# Patient Record
Sex: FEMALE | Race: WHITE | URBAN - METROPOLITAN AREA
[De-identification: names, ages, dates, MRNs, and addresses within clinical notes are randomized per-mention and may not be internally consistent; named-entity substitution may affect disease eponyms.]

---

## 2017-01-19 ENCOUNTER — OFFICE VISIT - HEALTHEAST (OUTPATIENT)
Dept: FAMILY MEDICINE | Facility: CLINIC | Age: 47
End: 2017-01-19
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
## 2017-01-30 ENCOUNTER — COMMUNICATION - HEALTHEAST (OUTPATIENT)
Dept: FAMILY MEDICINE | Facility: CLINIC | Age: 47
End: 2017-01-30

## 2017-01-30 DIAGNOSIS — Z12.83 SKIN EXAM, SCREENING FOR CANCER: ICD-10-CM

## 2017-01-31 ENCOUNTER — ALLSCRIPTS OFFICE VISIT (OUTPATIENT)
Dept: OTHER | Facility: OTHER | Age: 47
End: 2017-01-31

## 2017-01-31 ENCOUNTER — RECORDS - HEALTHEAST (OUTPATIENT)
Dept: ADMINISTRATIVE | Facility: OTHER | Age: 47
End: 2017-01-31

## 2017-01-31 DIAGNOSIS — Z79.899 OTHER LONG TERM (CURRENT) DRUG THERAPY: ICD-10-CM

## 2017-01-31 DIAGNOSIS — Z00.00 ENCOUNTER FOR GENERAL ADULT MEDICAL EXAMINATION WITHOUT ABNORMAL FINDINGS: ICD-10-CM

## 2017-01-31 DIAGNOSIS — E03.9 HYPOTHYROIDISM: ICD-10-CM

## 2017-01-31 DIAGNOSIS — R25.2 CRAMP AND SPASM: ICD-10-CM

## 2017-01-31 DIAGNOSIS — Z20.2 CONTACT WITH AND (SUSPECTED) EXPOSURE TO INFECTIONS WITH A PREDOMINANTLY SEXUAL MODE OF TRANSMISSION: ICD-10-CM

## 2017-01-31 DIAGNOSIS — L65.9 NONSCARRING HAIR LOSS: ICD-10-CM

## 2017-01-31 DIAGNOSIS — B19.20 VIRAL HEPATITIS C WITHOUT HEPATIC COMA: ICD-10-CM

## 2017-02-01 ENCOUNTER — HOSPITAL ENCOUNTER (OUTPATIENT)
Dept: RADIOLOGY | Age: 47
Discharge: HOME | End: 2017-02-01
Payer: COMMERCIAL

## 2017-02-01 DIAGNOSIS — Z13.9 SCREENING: ICD-10-CM

## 2017-02-01 PROCEDURE — G0202 SCR MAMMO BI INCL CAD: HCPCS

## 2017-02-04 ENCOUNTER — TRANSCRIBE ORDERS (OUTPATIENT)
Dept: LAB | Facility: HOSPITAL | Age: 47
End: 2017-02-04

## 2017-02-04 ENCOUNTER — APPOINTMENT (OUTPATIENT)
Dept: LAB | Facility: HOSPITAL | Age: 47
End: 2017-02-04
Payer: COMMERCIAL

## 2017-02-04 DIAGNOSIS — L65.9 ALOPECIA, UNSPECIFIED: Primary | ICD-10-CM

## 2017-02-04 DIAGNOSIS — E03.9 HYPOTHYROIDISM: ICD-10-CM

## 2017-02-04 DIAGNOSIS — L65.9 ALOPECIA, UNSPECIFIED: ICD-10-CM

## 2017-02-04 DIAGNOSIS — Z79.899 OTHER LONG TERM (CURRENT) DRUG THERAPY: ICD-10-CM

## 2017-02-04 DIAGNOSIS — Z00.00 ENCOUNTER FOR GENERAL ADULT MEDICAL EXAMINATION WITHOUT ABNORMAL FINDINGS: ICD-10-CM

## 2017-02-04 DIAGNOSIS — B19.20 VIRAL HEPATITIS C WITHOUT HEPATIC COMA: ICD-10-CM

## 2017-02-04 DIAGNOSIS — Z20.2 CONTACT WITH AND (SUSPECTED) EXPOSURE TO INFECTIONS WITH A PREDOMINANTLY SEXUAL MODE OF TRANSMISSION: ICD-10-CM

## 2017-02-04 DIAGNOSIS — L65.9 NONSCARRING HAIR LOSS: ICD-10-CM

## 2017-02-04 LAB
25(OH)D3 SERPL-MCNC: 27.1 NG/ML (ref 30–100)
ALBUMIN SERPL BCP-MCNC: 4 G/DL (ref 3.5–5)
ALP SERPL-CCNC: 58 U/L (ref 46–116)
ALT SERPL W P-5'-P-CCNC: 24 U/L (ref 12–78)
ANION GAP SERPL CALCULATED.3IONS-SCNC: 7 MMOL/L (ref 4–13)
AST SERPL W P-5'-P-CCNC: 16 U/L (ref 5–45)
BILIRUB SERPL-MCNC: 0.58 MG/DL (ref 0.2–1)
BUN SERPL-MCNC: 14 MG/DL (ref 5–25)
CALCIUM SERPL-MCNC: 8.4 MG/DL (ref 8.3–10.1)
CHLORIDE SERPL-SCNC: 102 MMOL/L (ref 100–108)
CHOLEST SERPL-MCNC: 176 MG/DL (ref 50–200)
CO2 SERPL-SCNC: 28 MMOL/L (ref 21–32)
CREAT SERPL-MCNC: 0.8 MG/DL (ref 0.6–1.3)
ERYTHROCYTE [DISTWIDTH] IN BLOOD BY AUTOMATED COUNT: 13 % (ref 11.6–15.1)
FERRITIN SERPL-MCNC: 10 NG/ML (ref 8–388)
GFR SERPL CREATININE-BSD FRML MDRD: >60 ML/MIN/1.73SQ M
GLUCOSE SERPL-MCNC: 77 MG/DL (ref 65–140)
HCG SERPL QL: NEGATIVE
HCT VFR BLD AUTO: 40.4 % (ref 34.8–46.1)
HDLC SERPL-MCNC: 85 MG/DL (ref 40–60)
HGB BLD-MCNC: 12.9 G/DL (ref 11.5–15.4)
LDLC SERPL CALC-MCNC: 80 MG/DL (ref 0–100)
MCH RBC QN AUTO: 28.9 PG (ref 26.8–34.3)
MCHC RBC AUTO-ENTMCNC: 31.9 G/DL (ref 31.4–37.4)
MCV RBC AUTO: 90 FL (ref 82–98)
PLATELET # BLD AUTO: 291 THOUSANDS/UL (ref 149–390)
PMV BLD AUTO: 9.2 FL (ref 8.9–12.7)
POTASSIUM SERPL-SCNC: 4.1 MMOL/L (ref 3.5–5.3)
PROT SERPL-MCNC: 7.7 G/DL (ref 6.4–8.2)
RBC # BLD AUTO: 4.47 MILLION/UL (ref 3.81–5.12)
SODIUM SERPL-SCNC: 137 MMOL/L (ref 136–145)
TRIGL SERPL-MCNC: 53 MG/DL
TSH SERPL DL<=0.05 MIU/L-ACNC: 0.45 UIU/ML (ref 0.36–3.74)
VIT B12 SERPL-MCNC: 299 PG/ML (ref 100–900)
WBC # BLD AUTO: 7.93 THOUSAND/UL (ref 4.31–10.16)

## 2017-02-04 PROCEDURE — 82728 ASSAY OF FERRITIN: CPT

## 2017-02-04 PROCEDURE — 87389 HIV-1 AG W/HIV-1&-2 AB AG IA: CPT

## 2017-02-04 PROCEDURE — 80074 ACUTE HEPATITIS PANEL: CPT

## 2017-02-04 PROCEDURE — 80061 LIPID PANEL: CPT

## 2017-02-04 PROCEDURE — 87591 N.GONORRHOEAE DNA AMP PROB: CPT

## 2017-02-04 PROCEDURE — 82607 VITAMIN B-12: CPT

## 2017-02-04 PROCEDURE — 84703 CHORIONIC GONADOTROPIN ASSAY: CPT

## 2017-02-04 PROCEDURE — 87491 CHLMYD TRACH DNA AMP PROBE: CPT

## 2017-02-04 PROCEDURE — 36415 COLL VENOUS BLD VENIPUNCTURE: CPT

## 2017-02-04 PROCEDURE — 85027 COMPLETE CBC AUTOMATED: CPT

## 2017-02-04 PROCEDURE — 80053 COMPREHEN METABOLIC PANEL: CPT

## 2017-02-04 PROCEDURE — 86592 SYPHILIS TEST NON-TREP QUAL: CPT

## 2017-02-04 PROCEDURE — 84443 ASSAY THYROID STIM HORMONE: CPT

## 2017-02-04 PROCEDURE — 82306 VITAMIN D 25 HYDROXY: CPT

## 2017-02-05 ENCOUNTER — GENERIC CONVERSION - ENCOUNTER (OUTPATIENT)
Dept: OTHER | Facility: OTHER | Age: 47
End: 2017-02-05

## 2017-02-05 LAB
HAV IGM SER QL: ABNORMAL
HBV CORE IGM SER QL: ABNORMAL
HBV SURFACE AG SER QL: ABNORMAL
HCV AB SER QL: ABNORMAL

## 2017-02-06 ENCOUNTER — GENERIC CONVERSION - ENCOUNTER (OUTPATIENT)
Dept: OTHER | Facility: OTHER | Age: 47
End: 2017-02-06

## 2017-02-06 LAB
CHLAMYDIA DNA CVX QL NAA+PROBE: NORMAL
HIV 1+2 AB+HIV1 P24 AG SERPL QL IA: NORMAL
N GONORRHOEA DNA GENITAL QL NAA+PROBE: NORMAL
RPR SER QL: NORMAL

## 2017-02-07 ENCOUNTER — GENERIC CONVERSION - ENCOUNTER (OUTPATIENT)
Dept: OTHER | Facility: OTHER | Age: 47
End: 2017-02-07

## 2017-02-23 ENCOUNTER — OFFICE VISIT - HEALTHEAST (OUTPATIENT)
Dept: ONCOLOGY | Facility: HOSPITAL | Age: 47
End: 2017-02-23

## 2017-02-23 ENCOUNTER — AMBULATORY - HEALTHEAST (OUTPATIENT)
Dept: INFUSION THERAPY | Facility: HOSPITAL | Age: 47
End: 2017-02-23

## 2017-02-23 DIAGNOSIS — C50.412 BREAST CANCER OF UPPER-OUTER QUADRANT OF LEFT FEMALE BREAST (H): ICD-10-CM

## 2017-02-23 DIAGNOSIS — C43.9 MELANOMA OF SKIN (H): ICD-10-CM

## 2017-02-27 ENCOUNTER — RECORDS - HEALTHEAST (OUTPATIENT)
Dept: ADMINISTRATIVE | Facility: OTHER | Age: 47
End: 2017-02-27

## 2017-02-28 ENCOUNTER — RECORDS - HEALTHEAST (OUTPATIENT)
Dept: ADMINISTRATIVE | Facility: OTHER | Age: 47
End: 2017-02-28

## 2017-03-01 ENCOUNTER — RECORDS - HEALTHEAST (OUTPATIENT)
Dept: ADMINISTRATIVE | Facility: OTHER | Age: 47
End: 2017-03-01

## 2017-03-06 ENCOUNTER — RECORDS - HEALTHEAST (OUTPATIENT)
Dept: ADMINISTRATIVE | Facility: OTHER | Age: 47
End: 2017-03-06

## 2017-03-07 ENCOUNTER — RECORDS - HEALTHEAST (OUTPATIENT)
Dept: ADMINISTRATIVE | Facility: OTHER | Age: 47
End: 2017-03-07

## 2017-04-04 ENCOUNTER — RECORDS - HEALTHEAST (OUTPATIENT)
Dept: ADMINISTRATIVE | Facility: OTHER | Age: 47
End: 2017-04-04

## 2017-06-13 ENCOUNTER — HOSPITAL ENCOUNTER (OUTPATIENT)
Dept: CT IMAGING | Facility: HOSPITAL | Age: 47
Discharge: HOME | End: 2017-06-13
Attending: FAMILY MEDICINE

## 2017-06-13 ENCOUNTER — TRANSCRIBE ORDERS (OUTPATIENT)
Dept: LAB | Facility: HOSPITAL | Age: 47
End: 2017-06-13

## 2017-06-13 ENCOUNTER — OFFICE VISIT - HEALTHEAST (OUTPATIENT)
Dept: FAMILY MEDICINE | Facility: CLINIC | Age: 47
End: 2017-06-13

## 2017-06-13 ENCOUNTER — AMBULATORY - HEALTHEAST (OUTPATIENT)
Dept: FAMILY MEDICINE | Facility: CLINIC | Age: 47
End: 2017-06-13

## 2017-06-13 ENCOUNTER — APPOINTMENT (OUTPATIENT)
Dept: LAB | Facility: HOSPITAL | Age: 47
End: 2017-06-13
Payer: COMMERCIAL

## 2017-06-13 DIAGNOSIS — R14.0 BLOATING: ICD-10-CM

## 2017-06-13 DIAGNOSIS — R79.9 ABNORMAL BLOOD CHEMISTRY: Primary | ICD-10-CM

## 2017-06-13 DIAGNOSIS — C43.9 MELANOMA (H): ICD-10-CM

## 2017-06-13 DIAGNOSIS — C50.919 BREAST CANCER (H): ICD-10-CM

## 2017-06-13 DIAGNOSIS — R10.9 ABDOMINAL PAIN: ICD-10-CM

## 2017-06-13 DIAGNOSIS — R79.9 ABNORMAL BLOOD CHEMISTRY: ICD-10-CM

## 2017-06-13 PROCEDURE — 83918 ORGANIC ACIDS TOTAL QUANT: CPT

## 2017-06-13 PROCEDURE — 36415 COLL VENOUS BLD VENIPUNCTURE: CPT

## 2017-06-13 PROCEDURE — 83090 ASSAY OF HOMOCYSTEINE: CPT

## 2017-06-13 ASSESSMENT — MIFFLIN-ST. JEOR: SCORE: 1268.04

## 2017-06-15 ENCOUNTER — COMMUNICATION - HEALTHEAST (OUTPATIENT)
Dept: FAMILY MEDICINE | Facility: CLINIC | Age: 47
End: 2017-06-15

## 2017-06-17 LAB — METHYLMALONATE SERPL-SCNC: 145 NMOL/L (ref 0–378)

## 2017-06-18 ENCOUNTER — GENERIC CONVERSION - ENCOUNTER (OUTPATIENT)
Dept: OTHER | Facility: OTHER | Age: 47
End: 2017-06-18

## 2017-06-21 LAB — MISCELLANEOUS LAB TEST RESULT: NORMAL

## 2017-06-22 ENCOUNTER — COMMUNICATION - HEALTHEAST (OUTPATIENT)
Dept: SCHEDULING | Facility: CLINIC | Age: 47
End: 2017-06-22

## 2017-06-22 ENCOUNTER — OFFICE VISIT - HEALTHEAST (OUTPATIENT)
Dept: FAMILY MEDICINE | Facility: CLINIC | Age: 47
End: 2017-06-22

## 2017-06-22 DIAGNOSIS — R10.13 EPIGASTRIC PAIN: ICD-10-CM

## 2017-06-22 DIAGNOSIS — R14.0 BLOATING: ICD-10-CM

## 2017-06-22 DIAGNOSIS — R14.2 BURPING: ICD-10-CM

## 2017-06-22 LAB — ALT SERPL W P-5'-P-CCNC: 20 U/L (ref 0–45)

## 2017-06-23 ENCOUNTER — HOSPITAL ENCOUNTER (OUTPATIENT)
Dept: ULTRASOUND IMAGING | Facility: HOSPITAL | Age: 47
Discharge: HOME | End: 2017-06-23
Attending: FAMILY MEDICINE

## 2017-06-23 ENCOUNTER — COMMUNICATION - HEALTHEAST (OUTPATIENT)
Dept: FAMILY MEDICINE | Facility: CLINIC | Age: 47
End: 2017-06-23

## 2017-06-23 DIAGNOSIS — R14.0 BLOATING: ICD-10-CM

## 2017-06-26 ENCOUNTER — AMBULATORY - HEALTHEAST (OUTPATIENT)
Dept: FAMILY MEDICINE | Facility: CLINIC | Age: 47
End: 2017-06-26

## 2017-06-26 ENCOUNTER — COMMUNICATION - HEALTHEAST (OUTPATIENT)
Dept: FAMILY MEDICINE | Facility: CLINIC | Age: 47
End: 2017-06-26

## 2017-06-26 DIAGNOSIS — R10.13 EPIGASTRIC PAIN: ICD-10-CM

## 2017-08-03 ENCOUNTER — RECORDS - HEALTHEAST (OUTPATIENT)
Dept: ADMINISTRATIVE | Facility: OTHER | Age: 47
End: 2017-08-03

## 2017-08-07 ENCOUNTER — RECORDS - HEALTHEAST (OUTPATIENT)
Dept: ADMINISTRATIVE | Facility: OTHER | Age: 47
End: 2017-08-07

## 2017-08-14 ENCOUNTER — OFFICE VISIT - HEALTHEAST (OUTPATIENT)
Dept: ONCOLOGY | Facility: HOSPITAL | Age: 47
End: 2017-08-14

## 2017-08-14 ENCOUNTER — HOSPITAL ENCOUNTER (OUTPATIENT)
Dept: RADIOLOGY | Facility: HOSPITAL | Age: 47
Discharge: HOME | End: 2017-08-14
Attending: INTERNAL MEDICINE

## 2017-08-14 DIAGNOSIS — C50.411 BREAST CANCER OF UPPER-OUTER QUADRANT OF RIGHT FEMALE BREAST (H): ICD-10-CM

## 2017-08-14 DIAGNOSIS — C50.412 BREAST CANCER OF UPPER-OUTER QUADRANT OF LEFT FEMALE BREAST (H): ICD-10-CM

## 2017-08-14 DIAGNOSIS — C43.9 MELANOMA OF SKIN (H): ICD-10-CM

## 2017-08-31 ENCOUNTER — GENERIC CONVERSION - ENCOUNTER (OUTPATIENT)
Dept: OTHER | Facility: OTHER | Age: 47
End: 2017-08-31

## 2017-10-12 ENCOUNTER — GENERIC CONVERSION - ENCOUNTER (OUTPATIENT)
Dept: OTHER | Facility: OTHER | Age: 47
End: 2017-10-12

## 2017-10-12 DIAGNOSIS — Z20.2 CONTACT WITH AND (SUSPECTED) EXPOSURE TO INFECTIONS WITH A PREDOMINANTLY SEXUAL MODE OF TRANSMISSION: ICD-10-CM

## 2017-10-12 DIAGNOSIS — E03.9 HYPOTHYROIDISM: ICD-10-CM

## 2017-10-12 DIAGNOSIS — B19.20 VIRAL HEPATITIS C WITHOUT HEPATIC COMA: ICD-10-CM

## 2017-10-13 ENCOUNTER — TRANSCRIBE ORDERS (OUTPATIENT)
Dept: LAB | Facility: HOSPITAL | Age: 47
End: 2017-10-13

## 2017-10-13 ENCOUNTER — LAB (OUTPATIENT)
Dept: LAB | Facility: HOSPITAL | Age: 47
End: 2017-10-13
Payer: COMMERCIAL

## 2017-10-13 DIAGNOSIS — Z20.2 CONTACT WITH AND (SUSPECTED) EXPOSURE TO INFECTIONS WITH A PREDOMINANTLY SEXUAL MODE OF TRANSMISSION: ICD-10-CM

## 2017-10-13 DIAGNOSIS — B19.20 VIRAL HEPATITIS C WITHOUT HEPATIC COMA: ICD-10-CM

## 2017-10-13 DIAGNOSIS — E03.9 HYPOTHYROIDISM: ICD-10-CM

## 2017-10-13 LAB
CHLAMYDIA DNA CVX QL NAA+PROBE: NORMAL
HAV IGM SER QL: ABNORMAL
HBV CORE IGM SER QL: ABNORMAL
HBV SURFACE AG SER QL: ABNORMAL
HCV AB SER QL: ABNORMAL
N GONORRHOEA DNA GENITAL QL NAA+PROBE: NORMAL
TSH SERPL DL<=0.05 MIU/L-ACNC: 1.42 UIU/ML (ref 0.36–3.74)

## 2017-10-13 PROCEDURE — 87591 N.GONORRHOEAE DNA AMP PROB: CPT

## 2017-10-13 PROCEDURE — 87491 CHLMYD TRACH DNA AMP PROBE: CPT

## 2017-10-13 PROCEDURE — 84443 ASSAY THYROID STIM HORMONE: CPT

## 2017-10-13 PROCEDURE — 87389 HIV-1 AG W/HIV-1&-2 AB AG IA: CPT

## 2017-10-13 PROCEDURE — 36415 COLL VENOUS BLD VENIPUNCTURE: CPT

## 2017-10-13 PROCEDURE — 80074 ACUTE HEPATITIS PANEL: CPT

## 2017-10-15 ENCOUNTER — GENERIC CONVERSION - ENCOUNTER (OUTPATIENT)
Dept: OTHER | Facility: OTHER | Age: 47
End: 2017-10-15

## 2017-10-16 ENCOUNTER — GENERIC CONVERSION - ENCOUNTER (OUTPATIENT)
Dept: OTHER | Facility: OTHER | Age: 47
End: 2017-10-16

## 2017-10-16 LAB — HIV 1+2 AB+HIV1 P24 AG SERPL QL IA: NORMAL

## 2017-10-23 ENCOUNTER — ALLSCRIPTS OFFICE VISIT (OUTPATIENT)
Dept: OTHER | Facility: OTHER | Age: 47
End: 2017-10-23

## 2017-10-24 NOTE — PROGRESS NOTES
Assessment  1  Hepatitis C (070 70) (B19 20)   2  Depression (311) (F32 9)   3  Generalized anxiety disorder (300 02) (F41 1)   4  Otitis externa (380 10) (H60 90)   5  Anxiety (300 00) (F41 9)    Plan   Allergic rhinitis    · Levocetirizine Dihydrochloride 5 MG Oral Tablet; TAKE 1 TABLET DAILY  Anxiety    · BusPIRone HCl - 7 5 MG Oral Tablet; 1 TAB BID AS NEEDED FOR ANXIETY  Hepatitis C    · 1 - John Fisher MD, Analy Aldana  (Gastroenterology) Co-Management  *  Status: Active  Requested for:  00CAR8876  Care Summary provided  : Yes  Otitis externa    · Hydrocortisone-Acetic Acid 1-2 % Otic Solution; INSTILL 3 DROPS 4 TIMES DAILY    Allergy/Immunology Referral Other Co-Management  *  Status: Hold For - Scheduling  Requested for: 38YWF0743  Ordered; For: Hepatitis C;  Ordered By: Karthik Snider  Performed:   Due: 27QNV6919     Discussion/Summary  Discussion Summary:   Start xyzalfor anxietygi dr Henrietta Santos for follow up of hep csolution for dry outer ear  Chief Complaint  Chief Complaint Chronic Condition [facility]: Patient is here today for follow up of chronic conditions described in HPI  History of Present Illness  HPI: patient is here to follow up on depression and anxietystill feels depressed and anxiousand vibriid not working  very tired and depressed  no suicidal thoughts  an itchy outer earchronic allergy symptoms,needs to see an allergist  she is allergic to her cat      Review of Systems  Complete-Female:   Constitutional: no fever,-- not feeling poorly,-- no chills-- and-- not feeling tired  Eyes: no eye pain,-- no eyesight problems,-- eyes not red-- and-- no purulent discharge from the eyes  ENT: nasal discharge, but-- no earache,-- no nosebleeds-- and-- no hearing loss  Cardiovascular: the heart rate was not slow,-- no chest pain,-- the heart rate was not fast-- and-- no palpitations  Respiratory: no shortness of breath,-- no cough,-- no wheezing-- and-- no shortness of breath during exertion  Gastrointestinal: no abdominal pain,-- no nausea,-- no constipation-- and-- no diarrhea  Genitourinary: no dysuria-- and-- no pelvic pain  Musculoskeletal: no arthralgias,-- no joint swelling,-- no myalgias-- and-- no joint stiffness  Integumentary: a rash-- and-- itching, but-- no skin lesions-- and-- no skin wound  Neurological: no headache,-- no numbness,-- no confusion-- and-- no dizziness  Psychiatric: anxiety-- and-- depression, but-- no sleep disturbances  Endocrine: no muscle weakness  Active Problems  1  Abnormal blood chemistry (790 6) (R79 9)   2  Allergic rhinitis (477 9) (J30 9)   3  Bereavement (V62 82) (Z63 4)   4  Bilateral thoracic back pain (724 1) (M54 6)   5  Depression (311) (F32 9)   6  Encounter for screening mammogram for malignant neoplasm of breast (V76 12) (Z12 31)   7  Exposure to STD (V01 6) (Z20 2)   8  Fatigue (780 79) (R53 83)   9  Generalized anxiety disorder (300 02) (F41 1)   10  Hair loss (704 00) (L65 9)   11  Hepatitis C (070 70) (B19 20)   12  Hypothyroidism (244 9) (E03 9)   13  Insomnia due to psychological stress (307 41) (F51 02)   14  Itching of ear (698 9) (L29 9)   15  Left leg pain (729 5) (M79 605)   16  Left leg swelling (729 81) (M79 89)   17  Leg cramping (729 82) (R25 2)   18  Leg edema, left (782 3) (R60 0)   19  Long term use of drug (V58 69) (Z79 899)   20  Low blood sugar (251 2) (E16 2)   21  Major depressive disorder, recurrent episode, severe (296 33) (F33 2)   22  Nasal congestion (478 19) (R09 81)   23  Need for prophylactic vaccination and inoculation against influenza (V04 81) (Z23)   24  Screening for cardiovascular condition (V81 2) (Z13 6)   25  Screening for cervical cancer (V76 2) (Z12 4)   26  Thoracic arthritis (721 2) (M46 84)   27  Viral hepatitis C without hepatic coma (070 70) (B19 20)    Past Medical History  Active Problems And Past Medical History Reviewed:    The active problems and past medical history were reviewed and updated today  Surgical History  1  History of Biopsy Of Liver  Surgical History Reviewed: The surgical history was reviewed and updated today  Family History  Mother    1  No pertinent family history  Father    2  Family history of Alcoholism in recovery   3  Family history of Bipolar affective disorder, current episode depressed, current episode severity   unspecified   4  Family history of COPD (chronic obstructive pulmonary disease) with acute bronchitis   5  Family history of depression (V17 0) (Z81 8)  Sister    10  Family history of depression (V17 0) (Z81 8)  Family History Reviewed: The family history was reviewed and updated today  Social History   · Bereavement (V62 82) (Z63 4)   · History of drug use (305 93) (Z87 898)   · Never a smoker   · Single  Social History Reviewed: The social history was reviewed and updated today  The social history was reviewed and is unchanged  Current Meds   1  Fexofenadine HCl - 180 MG Oral Tablet; TAKE 1 TABLET DAILY AS NEEDED FOR ALLERGIES; Therapy: 79DBW7396 to (Evaluate:13Oct2016)  Requested for: 90MDW7523; Last Rx:17Mar2016   Ordered   2  Levothyroxine Sodium 75 MCG Oral Tablet; TAKE 1 TABLET DAILY; Therapy: 14CSM2603 to (Evaluate:28Jun2018)  Requested for: 12LQK3930; Last Rx:01Oct2017   Ordered   3  Meloxicam 7 5 MG Oral Tablet; TAKE 1 TABLET Twice daily PRN PAIN;   Therapy: 24Uba3689 to (Evaluate:01May2017)  Requested for: 96IAJ1063; Last Rx:31Jan2017   Ordered   4  Montelukast Sodium 10 MG Oral Tablet; **TAKE 1 TABLET EVERY DAY; Therapy: 91OZO1941 to (Evaluate:83Nzz2090)  Requested for: 95ZFN5006; Last Rx:01Oct2017   Ordered   5  Viibryd 40 MG Oral Tablet; take one tab po daily with food; Therapy: 97XAE1988 to [phone])  Requested for: 12SFA2689; Last Rx:31Jan2017   Ordered   6  Zolpidem Tartrate 10 MG Oral Tablet; TAKE 1 TABLET AT  BEDTIME AS NEEDED FOR INSOMNIA; Therapy: 30FZN6446 to (Evaluate:79Jxb0688);  Last Rx:82Jir6691 Ordered  Medication List Reviewed: The medication list was reviewed and updated today  Allergies  1  CeleXA TABS   2  LaMICtal TABS    Vitals  Vital Signs    Recorded: 23Oct2017 08:45AM   Heart Rate 72   Respiration 16   Systolic 203   Diastolic 78   Height 5 ft 2 in   Weight 147 lb 0 8 oz   BMI Calculated 26 9   BSA Calculated 1 68   O2 Saturation 98     Physical Exam    Constitutional   General appearance: No acute distress, well appearing and well nourished  Eyes   Conjunctiva and lids: No swelling, erythema or discharge  Pupils and irises: Equal, round and reactive to light  Ears, Nose, Mouth, and Throat   External inspection of ears and nose: Normal     Otoscopic examination: Tympanic membranes translucent with normal light reflex  Canals patent without erythema  Nasal mucosa, septum, and turbinates: Normal without edema or erythema  Oropharynx: Normal with no erythema, edema, exudate or lesions  Pulmonary   Respiratory effort: No increased work of breathing or signs of respiratory distress  Auscultation of lungs: Clear to auscultation  Cardiovascular   Auscultation of heart: Normal rate and rhythm, normal S1 and S2, without murmurs  Examination of extremities for edema and/or varicosities: Normal     Abdomen   Abdomen: Non-tender, no masses  Liver and spleen: No hepatomegaly or splenomegaly  Lymphatic   Palpation of lymph nodes in neck: No lymphadenopathy  Musculoskeletal   Gait and station: Normal     Digits and nails: Normal without clubbing or cyanosis  Inspection/palpation of joints, bones, and muscles: Normal     Skin   Skin and subcutaneous tissue: Normal without rashes or lesions      Psychiatric   Orientation to person, place, and time: Normal     Mood and affect: Normal          Future Appointments    Date/Time Provider Specialty Site   10/25/2017 09:00 AM Tisha Mulligan LCSW  [facility] [address] [facility] PCP [facility]   11/14/2017 09:00 RITA Barry DO Internal Medicine [facility]     Signatures   Electronically signed by : Maryan Schmitt; Oct 23 2017 11:34AM EST                       (Author)    Electronically signed by : JEREMIE Khalil ; Oct 23 2017 12:04PM EST                       (Review)

## 2017-10-25 ENCOUNTER — ALLSCRIPTS OFFICE VISIT (OUTPATIENT)
Dept: OTHER | Facility: OTHER | Age: 47
End: 2017-10-25

## 2017-11-14 ENCOUNTER — ALLSCRIPTS OFFICE VISIT (OUTPATIENT)
Dept: OTHER | Facility: OTHER | Age: 47
End: 2017-11-14

## 2017-11-14 DIAGNOSIS — E03.9 HYPOTHYROIDISM: ICD-10-CM

## 2017-11-14 DIAGNOSIS — B19.20 VIRAL HEPATITIS C WITHOUT HEPATIC COMA: ICD-10-CM

## 2017-11-14 DIAGNOSIS — E55.9 VITAMIN D DEFICIENCY: ICD-10-CM

## 2017-11-15 ENCOUNTER — TRANSCRIBE ORDERS (OUTPATIENT)
Dept: LAB | Facility: HOSPITAL | Age: 47
End: 2017-11-15

## 2017-11-15 ENCOUNTER — APPOINTMENT (OUTPATIENT)
Dept: LAB | Facility: HOSPITAL | Age: 47
End: 2017-11-15
Payer: COMMERCIAL

## 2017-11-15 ENCOUNTER — GENERIC CONVERSION - ENCOUNTER (OUTPATIENT)
Dept: OTHER | Facility: OTHER | Age: 47
End: 2017-11-15

## 2017-11-15 DIAGNOSIS — E55.9 VITAMIN D DEFICIENCY: ICD-10-CM

## 2017-11-15 DIAGNOSIS — B19.20 VIRAL HEPATITIS C WITHOUT HEPATIC COMA: ICD-10-CM

## 2017-11-15 DIAGNOSIS — E03.9 HYPOTHYROIDISM: ICD-10-CM

## 2017-11-15 LAB
25(OH)D3 SERPL-MCNC: 37.4 NG/ML (ref 30–100)
TSH SERPL DL<=0.05 MIU/L-ACNC: 1.31 UIU/ML (ref 0.36–3.74)

## 2017-11-15 PROCEDURE — 36415 COLL VENOUS BLD VENIPUNCTURE: CPT

## 2017-11-15 PROCEDURE — 87522 HEPATITIS C REVRS TRNSCRPJ: CPT

## 2017-11-15 PROCEDURE — 84443 ASSAY THYROID STIM HORMONE: CPT

## 2017-11-15 PROCEDURE — 82306 VITAMIN D 25 HYDROXY: CPT

## 2017-11-15 NOTE — PROGRESS NOTES
Assessment    1  Never a smoker   2  Allergic rhinitis (477 9) (J30 9)   3  Depression (311) (F32 9)   4  Hepatitis C (070 70) (B19 20)   5  Hypothyroidism (244 9) (E03 9)   6  Low vitamin D level (268 9) (E55 9)  Assessment and plan 1  Anxiety and depression patient to see a new counselor she reports me not much improvement with the higher dose of Viibryd (but she is having weight gain since going on the higher dose) therefore I will have her reduce the Viibryd to 20 mg once daily, no suicidal ideation she will see her psychiatrist 2  Hepatitis-C antibody positive I will check a hepatitis C viral load patient to see GI 3  Allergic rhinitis the patient is on a nasal steroid and antihistamine she still has breakthrough symptoms she will be seeing an allergist 4  Hypothyroidism will check 3rd generation TSH 5  Low vitamin-D level continue with vitamin-D will check a vitamin-D level return to office   months 3  call if any problems   Plan  Depression    · From  Viibryd 40 MG Oral Tablet take one tab po daily with food To Viibryd 20 MG OralTablet Take 1 tablet daily  Hepatitis C    · (1) HEP C RNA PCR, QUANTITATIVE; Status:Active; Requested for:14Nov2017;   Hypothyroidism    · (1) TSH; Status:Active; Requested for:14Nov2017;   Low vitamin D level    · (1) VITAMIN D 25-HYDROXY; Status:Active - Retrospective By Protocol Authorization; Requestedfor:14Nov2017;    · *(Q) VITAMIN D, 25-HYDROXY, LC/MS/MS; Status:Active; Requested EM:33KLZ4635;     Chief Complaint  Chief Complaint Chronic Condition [facility]: Patient is here today for follow up of chronic conditions described in HPI        History of Present Illness  HPI: [age]-year old female coming in for a follow up office visit regarding Hepatitis-C, depression, general anxiety, hypothyroidism and allergic rhinitis the pt had seen Kelle Snow the med wasn't working right and getting new counsellor at [employer]- anxiety and depression for 6 months worsening , broke up with some one, grief , fatigue no si no hi, new boyfriend is supportive in rehab for etoh  healthy diet, walking 1 hour daily   weight gain  She is trying to follow a healthy imbalance diet  The patient reports me compliant taking medications without untoward side effects the  The patient is here to review his medical condition, update me on the medical condition and the patient reports me no hospitalizations and no ER visits  Review of Systems  Complete-Female:  Constitutional: No fever, no chills, feels well, no tiredness, no recent weight gain or weight loss  Cardiovascular: No complaints of slow heart rate, no fast heart rate, no chest pain, no palpitations, no leg claudication, no lower extremity edema  Respiratory: No complaints of shortness of breath, no wheezing, no cough, no SOB on exertion, no orthopnea, no PND  Gastrointestinal: No complaints of abdominal pain, no constipation, no nausea or vomiting, no diarrhea, no bloody stools  Genitourinary: No complaints of dysuria, no incontinence, no pelvic pain, no dysmenorrhea, no vaginal discharge or bleeding  Psychiatric: anxiety-- and-- depression, but-- not suicidal    ROS Reviewed:   ROS reviewed  Active Problems  1  Abnormal blood chemistry (790 6) (R79 9)   2  Allergic rhinitis (477 9) (J30 9)   3  Anxiety (300 00) (F41 9)   4  Bereavement (V62 82) (Z63 4)   5  Bilateral thoracic back pain (724 1) (M54 6)   6  Depression (311) (F32 9)   7  Encounter for screening mammogram for malignant neoplasm of breast (V76 12) (Z12 31)   8  Exposure to STD (V01 6) (Z20 2)   9  Fatigue (780 79) (R53 83)   10  Generalized anxiety disorder (300 02) (F41 1)   11  Hair loss (704 00) (L65 9)   12  Hepatitis C (070 70) (B19 20)   13  Hypothyroidism (244 9) (E03 9)   14  Insomnia due to psychological stress (307 41) (F51 02)   15  Itching of ear (698 9) (L29 9)   16  Left leg pain (729 5) (M79 605)   17  Left leg swelling (729 81) (M79 89)   18   Leg cramping (729 82) (R25 2)   19  Leg edema, left (782 3) (R60 0)   20  Long term use of drug (V58 69) (Z79 899)   21  Low blood sugar (251 2) (E16 2)   22  Major depressive disorder, recurrent episode, severe (296 33) (F33 2)   23  Nasal congestion (478 19) (R09 81)   24  Need for prophylactic vaccination and inoculation against influenza (V04 81) (Z23)   25  Otitis externa (380 10) (H60 90)   26  Screening for cardiovascular condition (V81 2) (Z13 6)   27  Screening for cervical cancer (V76 2) (Z12 4)   28  Thoracic arthritis (721 2) (M46 84)   29  Viral hepatitis C without hepatic coma (070 70) (B19 20)    Past Medical History  Active Problems And Past Medical History Reviewed: The active problems and past medical history were reviewed and updated today  Surgical History    1  History of Biopsy Of Liver  Surgical History Reviewed: The surgical history was reviewed and updated today  Family History  Mother    1  No pertinent family history  Father    2  Family history of Alcoholism in recovery   3  Family history of Bipolar affective disorder, current episode depressed, current episode severity unspecified   4  Family history of COPD (chronic obstructive pulmonary disease) with acute bronchitis   5  Family history of depression (V17 0) (Z81 8)  Sister    10  Family history of depression (V17 0) (Z81 8)  Family History Reviewed: The family history was reviewed and updated today  Social History     · Bereavement (V62 82) (Z63 4)   · History of drug use (305 93) (Z87 898)   · Never a smoker   · Non smoker / tobacco user (V49 89) (Z78 9)   · Single  Social History Reviewed: The social history was reviewed and updated today  The social history was reviewed and is unchanged  Current Meds   1  BusPIRone HCl - 7 5 MG Oral Tablet; 1 TAB BID AS NEEDED FOR ANXIETY; Therapy: 34YQL6443 to (Jed Lundborg)  Requested for: 23NEB4467; Last Rx:06Nov2017 Ordered   2   Fexofenadine HCl - 180 MG Oral Tablet; TAKE 1 TABLET DAILY AS NEEDED FOR ALLERGIES; Therapy: 59QOL2789 to (Evaluate:2016)  Requested for: 42LAB0432; Last Rx:2016 Ordered   3  Hydrocortisone-Acetic Acid 1-2 % Otic Solution; INSTILL 3 DROPS 4 TIMES DAILY; Therapy: 71VFD1048 to (Evaluate:05Nkt2467)  Requested for: 15BLD4344; Last Rx:2017 Ordered   4  Levocetirizine Dihydrochloride 5 MG Oral Tablet; TAKE 1 TABLET DAILY; Therapy: 89TNN3343 to (OAUFTGN40LDJ6044)  Requested for: 63NLD5533; Last Rx:2017 Ordered   5  Levothyroxine Sodium 75 MCG Oral Tablet; TAKE 1 TABLET DAILY; Therapy: 98ENI3447 to (Evaluate:2018)  Requested for: 73ZUW0547; Last Rx:2017 Ordered   6  Meloxicam 7 5 MG Oral Tablet; TAKE 1 TABLET Twice daily PRN PAIN; Therapy: 02Snt2224 to (Evaluate:2017)  Requested for: 06RHB2444; Last Rx:2017 Ordered   7  Montelukast Sodium 10 MG Oral Tablet; **TAKE 1 TABLET EVERY DAY; Therapy: 45FVU8757 to (Evaluate:32Vzu4741)  Requested for: 62QWC5159; Last Rx:2017 Ordered   8  Viibryd 40 MG Oral Tablet; take one tab po daily with food; Therapy: 69QRW9071 to ()  Requested for: 12KNE9634; Last Rx:2017 Ordered   9  Zolpidem Tartrate 10 MG Oral Tablet; TAKE 1 TABLET AT  BEDTIME AS NEEDED FOR INSOMNIA; Therapy: 95HDB8082 to (Evaluate:93Ttl1398); Last Rx:2016 Ordered  Medication List Reviewed: The medication list was reviewed and updated today  Allergies  1  CeleXA TABS   2  LaMICtal TABS    Vitals  Vital Signs    Recorded: 88ENN4405 08:54AM   Heart Rate 64   Respiration 16   Systolic 921, LUE, Sitting   Diastolic 88, LUE, Sitting   Weight 151 lb 0 8 oz   BMI Calculated 27 63   BSA Calculated 1 7   O2 Saturation 98       Physical Exam   Constitutional  General appearance: No acute distress, well appearing and well nourished  Eyes  Conjunctiva and lids: No swelling, erythema or discharge  Pupils and irises: Equal, round and reactive to light     Ears, Nose, Mouth, and Throat  External inspection of ears and nose: Normal    Otoscopic examination: Tympanic membranes translucent with normal light reflex  Canals patent without erythema  Nasal mucosa, septum, and turbinates: Normal without edema or erythema  Oropharynx: Normal with no erythema, edema, exudate or lesions  Pulmonary  Respiratory effort: No increased work of breathing or signs of respiratory distress  Auscultation of lungs: Clear to auscultation  Cardiovascular  Auscultation of heart: Normal rate and rhythm, normal S1 and S2, without murmurs  Examination of extremities for edema and/or varicosities: Normal    Abdomen  Abdomen: Non-tender, no masses  Psychiatric  Mood and affect: Abnormal   Mood and Affect: not anxious-- and-- depressed  Results/Data  (1) HIV AG/AB Alina Lithuanian GEN 72TYL2621 07:39AM Cyndy Smart    Order Number: [id number]     Test Name Result Flag Reference   HIV 1/2 AND P24 Non-Reactive  Non-Reactive   This test detects HIV 1, HIV2 and p24 Antigen  (1) CHLAMYDIA/GC AMPLIFIED DNA, PCR 70EWO8262 07:39AM Cyndy Smart    Order Number: [id number]     Test Name Result Flag Reference   CHLAMYDIA,AMPLIFIED DNA PROBE   C  trachomatis Amplified DNA Negative   C  trachomatis Amplified DNA Negative   For optimal microbe detection, urine samples should be a first catch specimen (20-60 ml of urine)  Patient should not have urinated for at least 1 hour prior to collection  A specimen not collected in this manner may have falsely negative results     Scarlet Mariusz AMPLIFIED DNA   N  gonorrhoeae Amplified DNA Negative   N  gonorrhoeae Amplified DNA Negative     (1) ACUTE HEPATITIS PANEL 97Qgh3918 07:39AM Liliam Schrader Order Number: [id number]     Test Name Result Flag Reference   HEPATITIS B SURFACE ANTIGEN Non-reactive  Non-reactive, NonReactive - Confirmed   HEPATITIS A IGM ANTIBODY Non-reactive  Non-reactive, Equivocal-Suggest Recollect   HEPATITIS C ANTIBODY High Reactive A Non-reactive   HEPATITIS B CORE IGM ANTIBODY Non-reactive  Non-reactive     (1) TSH WITH FT4 REFLEX 13Oct2017 07:39AM Richard De Leon    Order Number: [id number]     Test Name Result Flag Reference   TSH 1 420 uIU/mL  0 358-3 740     Patients undergoing fluorescein dye angiography may retain small amounts of fluorescein in the body for 48-72 hours post procedure  Samples containing fluorescein can produce falsely depressed TSH values  If the patient had this procedure,a specimen should be resubmitted post fluorescein clearance  The recommended reference ranges for TSH during pregnancy are as follows: First trimester 0 1 to 2 5 uIU/mL Second trimester  0 2 to 3 0 uIU/mL Third trimester 0 3 to 3 0 uIU/m     (1) METHYLMALONIC ACID,BLOOD 78MRS7997 08:39AM Richard De Leon     Test Name Result Flag Reference   METHYLMALONIC ACID 145 nmol/L  0 - 378     Performed at:  [address]  [id number] : Bruna Bruner MD, Phone:  [phone]     Health Management  Screening for cervical cancer   (1) THIN PREP PAP WITH IMAGING; every 5 years; Last 35MCR5169; Next Due: 04PEG5646;  Active    Future Appointments    Date/Time Provider Specialty Site   03/12/2018 08:00 AM Richard De Leon DO Internal Medicine [facility]   01/22/2018 08:40 AM Demetrius Harris MD Gastroenterology Adult [address]       Signatures   Electronically signed by : Tuyet Parker DO; Nov 14 2017 12:41PM EST                       (Author)

## 2017-11-17 LAB
HCV RNA SERPL NAA+PROBE-ACNC: NORMAL IU/ML
TEST INFORMATION: NORMAL

## 2017-11-19 ENCOUNTER — GENERIC CONVERSION - ENCOUNTER (OUTPATIENT)
Dept: OTHER | Facility: OTHER | Age: 47
End: 2017-11-19

## 2018-01-11 NOTE — MISCELLANEOUS
Provider Comments  Provider Comments:   Pt was a n/s  SWP she said she forgot, she will c/b to make new appt        Signatures   Electronically signed by : Lance Bashir DO; Oct 14 2016  4:29PM EST                       (Author)
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
## 2018-01-11 NOTE — RESULT NOTES
Message   notify the pt normal methylmalonic acid f/u as scheduled        Verified Results  (1) METHYLMALONIC ACID,BLOOD 91NPK9240 08:39AM Renee Pal     Test Name Result Flag Reference   METHYLMALONIC ACID 145 nmol/L  0 - 378   Performed at:  [address]  [id number]  : Jeromy Mejia MD, Phone:  [phone]       Signatures   Electronically signed by : Tyesha Neri DO; Jun 18 2017  1:13PM EST                       (Author)

## 2018-01-11 NOTE — MISCELLANEOUS
Message   Recorded as Task   Date: 05/11/2016 09:09 AM, Created By: Brendan Ventura   Task Name: Miscellaneous   Assigned To: Brendan Ventura   Regarding Patient: Petros De La Cruz, Status: Active   CommentRoxane Andrea - 11 May 2016 9:09 AM     TASK CREATED  Patient left a message with the answering service to cancel her appt  5/11/16 @ 10:45 AM with Dr Quique Blanco  The patient gave no reason for the cancellation and asked to be called 5/12/16 to reschedule  Kathleen Vieyra - 11 May 2016 9:11 AM     TASK REPLIED TO: Previously Assigned To Kathleen Vieyra md aware        Active Problems    1  Allergic rhinitis (477 9) (J30 9)   2  Bilateral thoracic back pain (724 1) (M54 6)   3  Depression (311) (F32 9)   4  Encounter for screening mammogram for malignant neoplasm of breast (V76 12)   (Z12 31)   5  Generalized anxiety disorder (300 02) (F41 1)   6  HCV (hepatitis C virus) (070 70) (B19 20)   7  Hypothyroidism (244 9) (E03 9)   8  Low blood sugar (251 2) (E16 2)   9  Major depressive disorder, recurrent episode, severe (296 33) (F33 2)   10  Need for prophylactic vaccination and inoculation against influenza (V04 81) (Z23)   11  Obesity (278 00) (E66 9)   12  Screening for cardiovascular condition (V81 2) (Z13 6)   13  Thoracic arthritis (721 2) (M46 84)    Current Meds   1  ALPRAZolam 0 25 MG Oral Tablet; TAKE 1 TABLET TWICE DAILY; Therapy: 63Wxc6874 to (Evaluate:24Oct2016); Last Rx:36Gyo1591 Ordered   2  Fexofenadine HCl - 180 MG Oral Tablet (Allegra Allergy); TAKE 1 TABLET DAILY AS   NEEDED FOR ALLERGIES; Therapy: 71CBO0044 to (Evaluate:11Hdq2706)  Requested for: 46MLB0642; Last   Rx:17Mar2016 Ordered   3  Levothyroxine Sodium 75 MCG Oral Tablet; TAKE 1 TABLET DAILY; Therapy: 03INC0127 to (Evaluate:17Qzo0748)  Requested for: 75FCF4820; Last   Rx:17Mar2016 Ordered   4  Montelukast Sodium 10 MG Oral Tablet (Singulair); take 1 tablet every day;    Therapy: 36RFT6502 to (Evaluate:15Jun2016)  Requested for: 99NAJ2478; Last   Rx:17Mar2016 Ordered   5  TraMADol HCl - 50 MG Oral Tablet; TAKE 1 TABLET EVERY 8 HOURS AS NEEDED; Therapy: 79TRG0890 to (Evaluate:06Apr2016); Last Rx:17Mar2016 Ordered   6  Viibryd 40 MG Oral Tablet; take one tab po daily with food; Therapy: 65MGI6392 to ([phone])  Requested for: 92BFN3949; Last   Rx:04Feb2016 Ordered   7  Zolpidem Tartrate 10 MG Oral Tablet (Ambien); TAKE 1 TABLET AT  BEDTIME AS   NEEDED FOR INSOMNIA; Therapy: 89DYG7833 to (Evaluate:22Nxz3255); Last Rx:91Oyw2576; Status: ACTIVE -   Renewal Voided Ordered    Allergies    1  CeleXA TABS   2  LaMICtal TABS    Signatures   Electronically signed by :  Kristina Childers, ; May 11 2016  9:26AM EST                       (Author)

## 2018-01-12 NOTE — RESULT NOTES
Message   Notify the patient normal TSH follow up as scheduled        Verified Results  (1) VITAMIN D 25-HYDROXY 87UZI7925 07:20AM Liliana Hidalgo     Test Name Result Flag Reference   VIT D 25-HYDROX 27 1 ng/mL L 30 0-100 0   This assay is a certified procedure of the CDC Vitamin D Standardization Certification Program (VDSCP)     Deficiency <20ng/ml   Insufficiency 20-30ng/ml   Sufficient  ng/ml     *Patients undergoing fluorescein dye angiography may retain small amounts of fluorescein in the body for 48-72 hours post procedure  Samples containing fluorescein can produce falsely elevated Vitamin D values  If the patient had this procedure, a specimen should be resubmitted post fluorescein clearance  (1) TSH 43ORC2574 07:20AM Liliana Hidalgo     Test Name Result Flag Reference   TSH 0 450 uIU/mL  0 358-3 740   - Patient Instructions: This is a fasting blood test  Water,black tea or black  coffee only after 9:00pm the night before test Drink 2 glasses of water the morning of test   Patients undergoing fluorescein dye angiography may retain small amounts of fluorescein in the body for 48-72 hours post procedure  Samples containing fluorescein can produce falsely depressed TSH values  If the patient had this procedure,a specimen should be resubmitted post fluorescein clearance            The recommended reference ranges for TSH during pregnancy are as follows:  First trimester 0 1 to 2 5 uIU/mL  Second trimester  0 2 to 3 0 uIU/mL  Third trimester 0 3 to 3 0 uIU/m       Signatures   Electronically signed by : Sridhar Townsend DO; Feb 5 2017  9:24AM EST                       (Author)

## 2018-01-12 NOTE — RESULT NOTES
Message   Notify the patient the chlamydia and gonorrhea tests were negative follow-up as scheduled        Verified Results  (1) CHLAMYDIA/GC AMPLIFIED DNA, PCR [id number] 07:20AM Bailey Donnelly    Order Number: [id number]     Test Name Result Flag Reference   CHLAMYDIA,AMPLIFIED DNA PROBE   C  trachomatis Amplified DNA Negative   C  trachomatis Amplified DNA Negative   For optimal microbe detection, urine samples should be a first catch specimen (20-60 ml of urine)  Patient should not have urinated for at least 1 hour prior to collection  A specimen not collected in this manner may have falsely negative results  N  GONORRHOEAE AMPLIFIED DNA   N  gonorrhoeae Amplified DNA Negative   N  gonorrhoeae Amplified DNA Negative       Plan  Abnormal blood chemistry    · (Q) METHYLMALONIC ACID AND HOMOCYSTEINE (NUTRITIONAL AND  CONGENITAL); Status:Active;  Requested for:97Ylx3507;     Signatures   Electronically signed by : Martina Ortiz DO; Feb 7 2017  7:38PM EST                       (Author)

## 2018-01-12 NOTE — RESULT NOTES
Message   Notify the patient low vitamin D level please let me know how much vitamin D the patient is currently taking        Verified Results  (1) VITAMIN D 25-HYDROXY [id number] 07:20AM Mandy Fletcher     Test Name Result Flag Reference   VIT D 25-HYDROX 27 1 ng/mL L 30 0-100 0   This assay is a certified procedure of the CDC Vitamin D Standardization Certification Program (VDSCP)     Deficiency <20ng/ml   Insufficiency 20-30ng/ml   Sufficient  ng/ml     *Patients undergoing fluorescein dye angiography may retain small amounts of fluorescein in the body for 48-72 hours post procedure  Samples containing fluorescein can produce falsely elevated Vitamin D values  If the patient had this procedure, a specimen should be resubmitted post fluorescein clearance         Signatures   Electronically signed by : Bailey Fontaine DO; Feb 5 2017  9:23AM EST                       (Author)

## 2018-01-13 VITALS
DIASTOLIC BLOOD PRESSURE: 82 MMHG | BODY MASS INDEX: 23.37 KG/M2 | HEIGHT: 62 IN | HEART RATE: 74 BPM | SYSTOLIC BLOOD PRESSURE: 116 MMHG | WEIGHT: 127 LBS | RESPIRATION RATE: 16 BRPM

## 2018-01-13 NOTE — RESULT NOTES
Message   Notify the patient the HIV test was nonreactive follow-up as scheduled to discuss the report        Verified Results  (1) HIV AG/AB Taryn Kinds GEN [id number] 07:39AM Devyn TILLMAN Order Number: [id number]     Test Name Result Flag Reference   HIV 1/2 AND P24 Non-Reactive  Non-Reactive   This test detects HIV 1, HIV2 and p24 Antigen

## 2018-01-14 VITALS
DIASTOLIC BLOOD PRESSURE: 88 MMHG | HEART RATE: 64 BPM | SYSTOLIC BLOOD PRESSURE: 128 MMHG | WEIGHT: 151.05 LBS | BODY MASS INDEX: 27.63 KG/M2 | RESPIRATION RATE: 16 BRPM | OXYGEN SATURATION: 98 %

## 2018-01-14 VITALS
HEART RATE: 72 BPM | BODY MASS INDEX: 27.06 KG/M2 | OXYGEN SATURATION: 98 % | RESPIRATION RATE: 16 BRPM | SYSTOLIC BLOOD PRESSURE: 118 MMHG | DIASTOLIC BLOOD PRESSURE: 78 MMHG | WEIGHT: 147.05 LBS | HEIGHT: 62 IN
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
## 2018-01-14 NOTE — RESULT NOTES
Message   Please notify the patient normal labs follow up as scheduled to discuss the report in detail        Verified Results  (1) VITAMIN D 25-HYDROXY 16TKE5405 08:03AM Steve Alonzo    Order Number: [id number]     Test Name Result Flag Reference   VIT D 25-HYDROX 37 4 ng/mL  30 0-100 0   This assay is a certified procedure of the CDC Vitamin D Standardization Certification Program (VDSCP)     Deficiency <20ng/ml   Insufficiency 20-30ng/ml   Sufficient  ng/ml     *Patients undergoing fluorescein dye angiography may retain small amounts of fluorescein in the body for 48-72 hours post procedure  Samples containing fluorescein can produce falsely elevated Vitamin D values  If the patient had this procedure, a specimen should be resubmitted post fluorescein clearance         Signatures   Electronically signed by : Flakito Mcleod DO; Nov 15 2017  9:14PM EST                       (Author)

## 2018-01-15 NOTE — PSYCH
Message  Message Free Text Note Form: Pt apparently requested Discharge, per conversation with Physician Assistant (Kerwni Romano ), so PT  IS DISCHARGED  Formal Discharge Summary to be completed as soon as time allows  -M  Lauren, MS, APRN, PMHCNS      Active Problems    1  Allergic rhinitis (477 9) (J30 9)   2  Bereavement (V62 82) (Z63 4)   3  Bilateral thoracic back pain (724 1) (M54 6)   4  Depression (311) (F32 9)   5  Encounter for screening mammogram for malignant neoplasm of breast (V76 12)   (Z12 31)   6  Generalized anxiety disorder (300 02) (F41 1)   7  HCV (hepatitis C virus) (070 70) (B19 20)   8  Hypothyroidism (244 9) (E03 9)   9  Left leg pain (729 5) (M79 605)   10  Leg edema, left (782 3) (R60 0)   11  Low blood sugar (251 2) (E16 2)   12  Major depressive disorder, recurrent episode, severe (296 33) (F33 2)   13  Need for prophylactic vaccination and inoculation against influenza (V04 81) (Z23)   14  Obesity (278 00) (E66 9)   15  Screening for cardiovascular condition (V81 2) (Z13 6)   16  Thoracic arthritis (721 2) (M46 84)    Current Meds   1  ALPRAZolam 0 25 MG Oral Tablet; TAKE 1 TABLET TWICE DAILY; Therapy: 08Gvg7485 to (Evaluate:62Vuc6824); Last Rx:43Nfc9755 Ordered   2  Fexofenadine HCl - 180 MG Oral Tablet; TAKE 1 TABLET DAILY AS NEEDED FOR   ALLERGIES; Therapy: 18PSM2589 to (Evaluate:13Oct2016)  Requested for: 39PAS5942; Last   Rx:17Mar2016 Ordered   3  Levothyroxine Sodium 75 MCG Oral Tablet; TAKE 1 TABLET DAILY; Therapy: 85KDQ4329 to (Evaluate:62Rvc1060)  Requested for: 47KSC6868; Last   Rx:17Mar2016 Ordered   4  Meloxicam 7 5 MG Oral Tablet; TAKE 1 TABLET Twice daily PRN PAIN;   Therapy: 66Snd4469 to (Evaluate:10Lyh6695)  Requested for: 83HJI3749; Last   Rx:45Tkn3038; Status: ACTIVE - Renewal Denied Ordered   5  Montelukast Sodium 10 MG Oral Tablet; take 1 tablet every day; Therapy: 34LUO3375 to (Evaluate:17Lwk1821)  Requested for: 56WAT0748; Last   Rx:44Fyd8023 Ordered   6  TraMADol HCl - 50 MG Oral Tablet; TAKE 1 TABLET EVERY 8 HOURS AS NEEDED; Therapy: 55FHN4158 to (Evaluate:06Apr2016); Last Rx:17Mar2016 Ordered   7  Viibryd 40 MG Oral Tablet; take one tab po daily with food; Therapy: 18LGE1398 to ([phone])  Requested for: 52HKL9928; Last   Rx:13Bvp8233 Ordered   8  Zolpidem Tartrate 10 MG Oral Tablet; TAKE 1 TABLET AT  BEDTIME AS NEEDED FOR   INSOMNIA; Therapy: 88IVQ4061 to (Evaluate:06Mtq8902); Last Rx:01Tvn4007 Ordered    Allergies    1  CeleXA TABS   2   LaMICtal TABS    Signatures   Electronically signed by : Safia Jha MSAPRNPMHCNS-BC; Sep  8 2016 11:42AM EST                       (Author)

## 2018-01-15 NOTE — PSYCH
History of Present Illness  Psychotherapy Provided [facility]: Individual Psychotherapy 25 minutes minutes provided today  Goals addressed in session:   Zane Ruiz details ongoing issues with anxiety  No longer has outpatient psychiatric provider  Does not want to return to [facility]  Not sure she would be accepted back  Has issues with non-compliance  She presents as somewhat anxious  She is pleasant, verbal and cooperative  HPI - Psych: Zane Ruiz having issues with anxiety and sleep  Has started BuSpar from PCP but has not been taking long enough to gain any benefit  History indicates need for outpatient psychiatric provider  Requesting outside provider  Denies depressive symptoms  No SI   Note   Note:   Contacted the [place] [address] [address] [facility] and they are accepting new patients  Provided number for their intake line and she agrees to contact  She also agrees to contact me once she is schedule  If there is an issues with her insurance, she will contact me as there is a private physician that may be an option  Patient left me message and she has intake appt at [facility] [address] on 11/10/17  Assessment    1   Anxiety (300 00) (F41 9)    Signatures   Electronically signed by : Rudy Rizvi LCSW; Oct 25 2017 12:23PM EST                       (Author)    Electronically signed by : Rudy Rizvi LCSW; Oct 25 2017 12:46PM EST                       (Author)

## 2018-01-15 NOTE — MISCELLANEOUS
Provider Comments  Provider Comments:   Patient no-showed for her appt with m today at 1:30PM       Signatures   Electronically signed by : Mingo Martinez LCSW; Mar 31 2016  1:59PM EST                       (Author)

## 2018-01-15 NOTE — RESULT NOTES
Message   Notify the patient negative labs including negative HIV test but there is a positive hepatitis C test please have the patient see GI doctor Otilia Woodard (also have pt address the low ferritin), also low ferritin - is the pt on iron , also low normal vitamin B12 level please check methylmalonic acid and homocystine level vic   f/u to disuss         Verified Results  (1) HIV AG/AB Ella Poon GEN 75PPK8097 07:20AM [facility]    Order Number: [id number]     Test Name Result Flag Reference   HIV 1/2 AND P24 Non-Reactive  Non-Reactive   This test detects HIV 1, HIV2 and p24 Antigen  (1) RPR 26AAV7299 07:20AM [facility] Order Number: [id number]     Test Name Result Flag Reference   RPR Non-Reactive  Non-Reactive     (1) ACUTE HEPATITIS PANEL 43EKJ3751 07:20AM [facility]    Order Number: [id number]     Test Name Result Flag Reference   HEPATITIS B SURFACE ANTIGEN Non-reactive  Non-reactive, NonReactive - Confirmed   HEPATITIS A IGM ANTIBODY Non-reactive  Non-reactive, Equivocal-Suggest Recollect   HEPATITIS C ANTIBODY High Reactive A Non-reactive   HEPATITIS B CORE IGM ANTIBODY Non-reactive  Non-reactive     (1) COMPREHENSIVE METABOLIC PANEL 46ISK5863 29:33YG [facility]    Order Number: [id number]     Test Name Result Flag Reference   GLUCOSE,RANDM 77 mg/dL     If the patient is fasting, the ADA then defines impaired fasting glucose as > 100 mg/dL and diabetes as > or equal to 123 mg/dL     SODIUM 137 mmol/L  136-145   POTASSIUM 4 1 mmol/L  3 5-5 3   CHLORIDE 102 mmol/L  100-108   CARBON DIOXIDE 28 mmol/L  21-32   ANION GAP (CALC) 7 mmol/L  4-13   BLOOD UREA NITROGEN 14 mg/dL  5-25   CREATININE 0 80 mg/dL  0 60-1 30   Standardized to IDMS reference method   CALCIUM 8 4 mg/dL  8 3-10 1   BILI, TOTAL 0 58 mg/dL  0 20-1 00   ALK PHOSPHATAS 58 U/L     ALT (SGPT) 24 U/L  12-78   AST(SGOT) 16 U/L  5-45   ALBUMIN 4 0 g/dL 3 5-5 0   TOTAL PROTEIN 7 7 g/dL  6 4-8 2   eGFR Non-African American      >60 0 ml/min/1 73sq m   - Patient Instructions: This is a fasting blood test  Water,black tea or black  coffee only after 9:00pm the night before test Drink 2 glasses of water the morning of test   National Kidney Disease Education Program recommendations are as follows:  GFR calculation is accurate only with a steady state creatinine  Chronic Kidney disease less than 60 ml/min/1 73 sq  meters  Kidney failure less than 15 ml/min/1 73 sq  meters  (1) LIPID PANEL, FASTING 04GYV0281 07:20AM Vanessa Abida   TW Order Number: [id number]     Test Name Result Flag Reference   CHOLESTEROL 176 mg/dL     HDL,DIRECT 85 mg/dL H 40-60   Specimen collection should occur prior to Metamizole administration due to the potential for falsely depressed results  LDL CHOLESTEROL CALCULATED 80 mg/dL  0-100   - Patient Instructions: This is a fasting blood test  Water,black tea or black  coffee only after 9:00pm the night before test   Drink 2 glasses of water the morning of test     - Patient Instructions: This is a fasting blood test  Water,black tea or black  coffee only after 9:00pm the night before test Drink 2 glasses of water the morning of test   Triglyceride:         Normal              <150 mg/dl       Borderline High    150-199 mg/dl       High               200-499 mg/dl       Very High          >499 mg/dl  Cholesterol:         Desirable        <200 mg/dl      Borderline High  200-239 mg/dl      High             >239 mg/dl  HDL Cholesterol:        High    >59 mg/dL      Low     <41 mg/dL  LDL CALCULATED:    This screening LDL is a calculated result  It does not have the accuracy of the Direct Measured LDL in the monitoring of patients with hyperlipidemia and/or statin therapy  Direct Measure LDL (FFF021) must be ordered separately in these patients     TRIGLYCERIDES 53 mg/dL  <=150   Specimen collection should occur prior to N-Acetylcysteine or Metamizole administration due to the potential for falsely depressed results  (1) VITAMIN B12 17NXP0441 07:20AM [facility] Order Number: [id number]     Test Name Result Flag Reference   VITAMIN B12 299 pg/mL  100-900   - Patient Instructions: This is a fasting blood test  Water,black tea or black  coffee only after 9:00pm the night before test Drink 2 glasses of water the morning of test      (1) FERRITIN 65BCV2816 07:20AM [facility] Order Number: [id number]     Test Name Result Flag Reference   FERRITIN 10 ng/mL  0-980   - Patient Instructions: This is a fasting blood test  Water,black tea or black  coffee only after 9:00pm the night before test Drink 2 glasses of water the morning of test      (1) CBC/ PLT (NO DIFF) 49HCX4109 07:20AM Cheyanne Gomezs Order Number: [id number]     Test Name Result Flag Reference   HEMATOCRIT 40 4 %  34 8-46 1   HEMOGLOBIN 12 9 g/dL  11 5-15 4   MCHC 31 9 g/dL  31 4-37 4   MCH 28 9 pg  26 8-34 3   MCV 90 fL  82-98   PLATELET COUNT 234 Thousands/uL  149-390   RBC COUNT 4 47 Million/uL  3 81-5 12   RDW 13 0 %  11 6-15 1   WBC COUNT 7 93 Thousand/uL  4 31-10 16   MPV 9 2 fL  8 9-12 7     (1) PREGNANCY TEST (HCG QUALITATIVE) [id number] 07:20AM [facility] Order Number: [id number]     Test Name Result Flag Reference   PREGNANCY TEST Negative  Negative   - Patient Instructions:  This is a fasting blood test  Water,black tea or black  coffee only after 9:00pm the night before test Drink 2 glasses of water the morning of test        Signatures   Electronically signed by : Laura Ly DO; Feb 6 2017  6:53PM EST                       (Author)

## 2018-01-16 NOTE — RESULT NOTES
Message   please have pt f/u as scheduled to discuss   please have pt f/u with Darlyn to discuss positive result Hepatitic C antibody high reactive        Verified Results  (1) CHLAMYDIA/GC AMPLIFIED DNA, PCR 13Oct2017 07:39AM Don Oven   TW Order Number: [id number]     Test Name Result Flag Reference   CHLAMYDIA,AMPLIFIED DNA PROBE   C  trachomatis Amplified DNA Negative   C  trachomatis Amplified DNA Negative   For optimal microbe detection, urine samples should be a first catch specimen (20-60 ml of urine)  Patient should not have urinated for at least 1 hour prior to collection  A specimen not collected in this manner may have falsely negative results  Katrin Smith AMPLIFIED DNA   N  gonorrhoeae Amplified DNA Negative   N  gonorrhoeae Amplified DNA Negative     (1) ACUTE HEPATITIS PANEL 13Oct2017 07:39AM Zeny Pour Order Number: [id number]     Test Name Result Flag Reference   HEPATITIS B SURFACE ANTIGEN Non-reactive  Non-reactive, NonReactive - Confirmed   HEPATITIS A IGM ANTIBODY Non-reactive  Non-reactive, Equivocal-Suggest Recollect   HEPATITIS C ANTIBODY High Reactive A Non-reactive   HEPATITIS B CORE IGM ANTIBODY Non-reactive  Non-reactive   HEPATITIS B SURFACE ANTIGEN Non-reactive  Non-reactive, NonReactive - Confirmed   HEPATITIS A IGM ANTIBODY Non-reactive  Non-reactive, Equivocal-Suggest Recollect   HEPATITIS C ANTIBODY High Reactive A Non-reactive   HEPATITIS B CORE IGM ANTIBODY Non-reactive  Non-reactive                   Plan  Exposure to STD    · (1) HIV AG/AB COMBO, 4TH GEN; [Do Not Release]; Status:Active;  Requested  for:12Oct2017;

## 2018-01-17 NOTE — RESULT NOTES
Message   Please notify the patient normal labs follow up as scheduled to discuss the report in detail        Verified Results  (1) VITAMIN D 25-HYDROXY [id number] 08:03AM Taye Wallace    Order Number: [id number]     Test Name Result Flag Reference   VIT D 25-HYDROX 37 4 ng/mL  30 0-100 0   This assay is a certified procedure of the CDC Vitamin D Standardization Certification Program (VDSCP)     Deficiency <20ng/ml   Insufficiency 20-30ng/ml   Sufficient  ng/ml     *Patients undergoing fluorescein dye angiography may retain small amounts of fluorescein in the body for 48-72 hours post procedure  Samples containing fluorescein can produce falsely elevated Vitamin D values  If the patient had this procedure, a specimen should be resubmitted post fluorescein clearance  (1) TSH [id number] 08:03AM Taye Wallace    Order Number: [id number]     Test Name Result Flag Reference   TSH 1 310 uIU/mL  0 358-3 740   Patients undergoing fluorescein dye angiography may retain small amounts of fluorescein in the body for 48-72 hours post procedure  Samples containing fluorescein can produce falsely depressed TSH values  If the patient had this procedure,a specimen should be resubmitted post fluorescein clearance            The recommended reference ranges for TSH during pregnancy are as follows:  First trimester 0 1 to 2 5 uIU/mL  Second trimester  0 2 to 3 0 uIU/mL  Third trimester 0 3 to 3 0 uIU/m
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
## 2018-02-06 ENCOUNTER — TRANSCRIBE ORDERS (OUTPATIENT)
Dept: LAB | Facility: CLINIC | Age: 48
End: 2018-02-06

## 2018-02-10 DIAGNOSIS — F32.A DEPRESSION, UNSPECIFIED DEPRESSION TYPE: ICD-10-CM

## 2018-02-10 DIAGNOSIS — J45.909 UNCOMPLICATED ASTHMA, UNSPECIFIED ASTHMA SEVERITY, UNSPECIFIED WHETHER PERSISTENT: Primary | ICD-10-CM

## 2018-02-10 RX ORDER — VILAZODONE HYDROCHLORIDE 20 MG/1
TABLET ORAL
Qty: 30 TABLET | Refills: 2 | Status: SHIPPED | OUTPATIENT
Start: 2018-02-10 | End: 2018-05-25 | Stop reason: ALTCHOICE

## 2018-02-10 RX ORDER — MONTELUKAST SODIUM 10 MG/1
TABLET ORAL
Qty: 90 TABLET | Refills: 0 | Status: SHIPPED | OUTPATIENT
Start: 2018-02-10 | End: 2018-05-25 | Stop reason: ALTCHOICE

## 2018-02-26 RX ORDER — MELOXICAM 7.5 MG/1
1 TABLET ORAL 2 TIMES DAILY PRN
COMMUNITY
Start: 2016-08-23 | End: 2018-05-25 | Stop reason: ALTCHOICE

## 2018-02-26 RX ORDER — LURASIDONE HYDROCHLORIDE 40 MG/1
20 TABLET, FILM COATED ORAL 2 TIMES DAILY
Refills: 0 | COMMUNITY
Start: 2018-02-03 | End: 2018-05-25 | Stop reason: ALTCHOICE

## 2018-02-26 RX ORDER — HYDROCORTISONE AND ACETIC ACID 20.75; 10.375 MG/ML; MG/ML
3 SOLUTION AURICULAR (OTIC) 4 TIMES DAILY
COMMUNITY
Start: 2017-10-23 | End: 2018-03-19 | Stop reason: SDUPTHER

## 2018-02-26 RX ORDER — ZOLPIDEM TARTRATE 10 MG/1
1 TABLET ORAL
COMMUNITY
Start: 2015-09-29 | End: 2018-05-25 | Stop reason: ALTCHOICE

## 2018-02-26 RX ORDER — BUSPIRONE HYDROCHLORIDE 7.5 MG/1
1 TABLET ORAL 2 TIMES DAILY PRN
COMMUNITY
Start: 2017-10-23 | End: 2018-05-25 | Stop reason: ALTCHOICE

## 2018-02-26 RX ORDER — LEVOTHYROXINE SODIUM 0.07 MG/1
1 TABLET ORAL DAILY
COMMUNITY
Start: 2015-01-27 | End: 2018-05-25 | Stop reason: SDUPTHER

## 2018-02-26 RX ORDER — FEXOFENADINE HCL 180 MG/1
1 TABLET ORAL DAILY PRN
COMMUNITY
Start: 2016-03-17 | End: 2018-05-25 | Stop reason: ALTCHOICE

## 2018-02-26 RX ORDER — LEVOCETIRIZINE DIHYDROCHLORIDE 5 MG/1
1 TABLET, FILM COATED ORAL DAILY
COMMUNITY
Start: 2017-10-23 | End: 2018-04-20 | Stop reason: SDUPTHER

## 2018-03-01 DIAGNOSIS — F32.A DEPRESSION, UNSPECIFIED DEPRESSION TYPE: Primary | ICD-10-CM

## 2018-03-01 RX ORDER — VILAZODONE HYDROCHLORIDE 40 MG/1
TABLET ORAL
Qty: 90 TABLET | Refills: 1 | Status: SHIPPED | OUTPATIENT
Start: 2018-03-01 | End: 2018-05-25 | Stop reason: ALTCHOICE

## 2018-03-07 NOTE — PSYCH
History of Present Illness    Discharge Summary: Admission Date: 9/1/16  Patient was referred by Dr Barragan Hind office and Dr Ezra Michel  Discharge Date: 9/ 6 /16  This was a routine discharge  Pt requested Discharge on 9/6/16, and stated she was getting another Psychiatrist/ other outpatient treatment elswhere---pt was discharged on 9/6/16  This Discharge Summary Completed on 12 /27 /16 (due to previous time constraints/ workload  )  Discharge Diagnosis:    Axis I: Major Depressive Disorder, Recurrent, moderate, F33 1; Generalized Anxiety Disorder, F41 10; Bereavement, Z63 4; Past hx of alcohol and cocaine abuse, alleged remission  Axis II: Deferred  Axis III: Hepatitis C; Hypothyroidism; Back Pain; Left Leg Pain  Axis IV: moderately severe: Financial; Legal issues re: Charges against her of credit card theft and fraud; Chronic Medical and Psych conditions; father's death in January 2016; Limited social Supports  Axis V: 40  Treating Physician: Dr Elie George (no longer with this practice )   Treatment Complications: None Admit: 40  Discharge: 40  Prognosis at time of discharge is fair  Presenting Problem/Pertinent findings:  Pt presented with depression, anxiety, bereavement re: the death of her father earlier in 2016, and additional high stress re: Legal charges against her for her alleged theft of,and use of, someone else's Credit Card at a [employer]  Pt denied MANUEL Barry   [facility]  Pt had a past hx of mental health care at various inpatient and outpatient facilities,and pt had a past hx of drug and alcohol abuse, but stated she was clean of cocaine and alcohol abuse at the time of starting [facility] Psychotherapy at [facility]  Course of treatment includes  individual counseling, cognitive behavioral, psychiatric evaluation, medication management and eclectic     Treatment Progress: Pt participated only in the Evaluation Psychotherapy session on 9 /1/ 16,and she participated in brief Medication Management by Dr Yemi Vegas  Pt stated she was going to get [address] elsewhere when she left a message with Gerardo Jonas, Physician Ass't, on 9 /6 /16,and pt was discharged per request on 9/6 /16  The consumer achieved some of their goals  Criteria for Discharge: The patient has   Pt discharged per her request on 9 /6 /16  Aftercare recommendations include: It is recommended that pt have Outpatient Psychotherapy and Outpatient Psychiatric Medication Management, ongoing, as she had said she was doing on 9 /6 /16  Continue regular Health check-ups at Primary Care Physician (she was seeing Dr Jami Vieyra, per chart )   Discharge Medications include: None prescribed by this Provider, but pt was taking meds prescribed by Psychiatrist (Viibryd 40mg daily, Xanax  25mg bid,and Ambien 10mg at hs, )when she was discharged on 9/ 6 /16  Prognosis: Prognosis is Fair  Pt has the ability to follow medical recommendations and prescribed medication instructions, if she so chooses,and it is highly recommended that she have the follow-up Outpatient Treatment outlined above  It is also recommended she continue follow-up Regular health check-ups at her Primary Care Physician, and continue to abstain from Drugs and Alcohol  Active Problems    1  Allergic rhinitis (477 9) (J30 9)   2  Bereavement (V62 82) (Z63 4)   3  Bilateral thoracic back pain (724 1) (M54 6)   4  Depression (311) (F32 9)   5  Encounter for screening mammogram for malignant neoplasm of breast (V76 12)   (Z12 31)   6  Generalized anxiety disorder (300 02) (F41 1)   7  HCV (hepatitis C virus) (070 70) (B19 20)   8  Hypothyroidism (244 9) (E03 9)   9  Insomnia due to psychological stress (307 41) (F51 02)   10  Left leg pain (729 5) (M79 605)   11  Left leg swelling (729 81) (M79 89)   12  Leg edema, left (782 3) (R60 0)   13  Long term use of drug (V58 69) (Z79 899)   14  Low blood sugar (251 2) (E16 2)   15  Major depressive disorder, recurrent episode, severe (296 33) (F33 2)   16  Nasal congestion (478 19) (R09 81)   17  Need for prophylactic vaccination and inoculation against influenza (V04 81) (Z23)   18  Obesity (278 00) (E66 9)   19  Screening for cardiovascular condition (V81 2) (Z13 6)   20  Screening for cervical cancer (V76 2) (Z12 4)   21  Thoracic arthritis (721 2) (M46 84)    Social History    · Bereavement (V62 82) (Z63 4)   · History of drug use (305 93) (E04 332)   · Never a smoker   · Single    Allergies    1  CeleXA TABS   2  LaMICtal TABS    Current Meds   1  ALPRAZolam 0 25 MG Oral Tablet; TAKE 1 TABLET TWICE DAILY; Therapy: 69Epo1004 to (Evaluate:24Oct2016); Last Rx:80Mrv0959 Ordered   2  ClonazePAM 1 MG Oral Tablet; TAKE 1 TABLET Bedtime PRN; Therapy: 64Tfu7932 to (Evaluate:15Oct2016); Last Rx:52Pek1891 Ordered   3  Fexofenadine HCl - 180 MG Oral Tablet (Allegra Allergy); TAKE 1 TABLET DAILY AS   NEEDED FOR ALLERGIES; Therapy: 04VYS9836 to (Evaluate:13Oct2016)  Requested for: 19ZAN9621; Last   Rx:17Mar2016 Ordered   4  Levothyroxine Sodium 75 MCG Oral Tablet; TAKE 1 TABLET DAILY; Therapy: 37GJQ3499 to (Evaluate:76Dpg8125)  Requested for: 30Oct2016; Last   Rx:30Oct2016 Ordered   5  Meloxicam 7 5 MG Oral Tablet; TAKE 1 TABLET Twice daily PRN PAIN;   Therapy: 43Mlw5033 to (Evaluate:01Cwg9800)  Requested for: 60PWV4420; Last   Rx:94Edy6677; Status: ACTIVE - Renewal Denied Ordered   6  Montelukast Sodium 10 MG Oral Tablet (Singulair); **TAKE 1 TABLET EVERY DAY; Therapy: 98IEI9117 to (Evaluate:01Jan2017)  Requested for: 00JGW4274; Last   Rx:03Oct2016 Ordered   7  TraMADol HCl - 50 MG Oral Tablet; TAKE 1 TABLET EVERY 8 HOURS AS NEEDED; Therapy: 95PIF6282 to (Evaluate:06Apr2016); Last Rx:17Mar2016 Ordered   8  Viibryd 40 MG Oral Tablet; take one tab po daily with food;    Therapy: 02TXO6671 to 21 )  Requested for: 93YXT5720; Last Rx:39Vjk6622 Ordered   9  Zolpidem Tartrate 10 MG Oral Tablet (Ambien); TAKE 1 TABLET AT  BEDTIME AS   NEEDED FOR INSOMNIA; Therapy: 47DMS6011 to (Evaluate:69Vup5564); Last Rx:97Rjc4257 Ordered    Future Appointments    Date/Time Provider Specialty Site   01/19/2017 09:15 AM Jennifer Auguste, MS, APRN, PMHCNS_BS  [facility]   01/31/2017 09:00 AM Carlos Palencia DO [facility] Medicine [facility] [address] [place] [address]   02/07/2017 02:00 PM JEREMIE Carson   Psychiatry [address] [facility]     Signatures   Electronically signed by : Mariza Short MSAPRNPMHCNS-BC; Dec 27 2016  6:11PM EST                       (Author)    Electronically signed by : JEREMIE Veronica ; Dec 28 2016  9:08AM EST                       (Author)
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
## 2018-03-13 RX ORDER — HYDROCORTISONE AND ACETIC ACID 20.75; 10.375 MG/ML; MG/ML
3 SOLUTION AURICULAR (OTIC) 4 TIMES DAILY
Qty: 10 ML | Refills: 2 | OUTPATIENT
Start: 2018-03-13

## 2018-03-19 DIAGNOSIS — H60.8X9: Primary | ICD-10-CM

## 2018-03-19 RX ORDER — HYDROCORTISONE AND ACETIC ACID 20.75; 10.375 MG/ML; MG/ML
3 SOLUTION AURICULAR (OTIC) 4 TIMES DAILY
Qty: 10 ML | Refills: 0 | Status: SHIPPED | OUTPATIENT
Start: 2018-03-19 | End: 2018-04-20 | Stop reason: SDUPTHER

## 2018-04-20 DIAGNOSIS — H60.8X9: ICD-10-CM

## 2018-04-20 NOTE — TELEPHONE ENCOUNTER
Patient said she still needs the ear drops because her ears are dry and itchy  She said they are always like this

## 2018-04-21 RX ORDER — HYDROCORTISONE AND ACETIC ACID 20.75; 10.375 MG/ML; MG/ML
3 SOLUTION AURICULAR (OTIC) 4 TIMES DAILY
Qty: 10 ML | Refills: 0 | Status: SHIPPED | OUTPATIENT
Start: 2018-04-21 | End: 2018-05-25 | Stop reason: SDUPTHER

## 2018-04-21 RX ORDER — LEVOCETIRIZINE DIHYDROCHLORIDE 5 MG/1
5 TABLET, FILM COATED ORAL DAILY
Qty: 90 TABLET | Refills: 2 | Status: SHIPPED | OUTPATIENT
Start: 2018-04-21 | End: 2018-05-25 | Stop reason: SDUPTHER

## 2018-05-01 RX ORDER — CARBAMAZEPINE 200 MG/1
1 TABLET ORAL 4 TIMES DAILY
Refills: 2 | COMMUNITY
Start: 2018-04-19 | End: 2018-05-25 | Stop reason: ALTCHOICE

## 2018-05-11 ENCOUNTER — TELEPHONE (OUTPATIENT)
Dept: INTERNAL MEDICINE CLINIC | Facility: CLINIC | Age: 48
End: 2018-05-11

## 2018-05-13 NOTE — TELEPHONE ENCOUNTER
Check comprehensive metabolic panel, lipid panel, TSH diagnosis screening for cardiovascular disease

## 2018-05-15 ENCOUNTER — APPOINTMENT (OUTPATIENT)
Dept: LAB | Facility: HOSPITAL | Age: 48
End: 2018-05-15
Payer: COMMERCIAL

## 2018-05-15 DIAGNOSIS — Z13.6 SCREENING FOR CARDIOVASCULAR CONDITION: Primary | ICD-10-CM

## 2018-05-15 LAB
ALBUMIN SERPL BCP-MCNC: 3.5 G/DL (ref 3.5–5)
ALP SERPL-CCNC: 93 U/L (ref 46–116)
ALT SERPL W P-5'-P-CCNC: 27 U/L (ref 12–78)
ANION GAP SERPL CALCULATED.3IONS-SCNC: 10 MMOL/L (ref 4–13)
AST SERPL W P-5'-P-CCNC: 31 U/L (ref 5–45)
BILIRUB SERPL-MCNC: 0.27 MG/DL (ref 0.2–1)
BUN SERPL-MCNC: 12 MG/DL (ref 5–25)
CALCIUM SERPL-MCNC: 8.5 MG/DL (ref 8.3–10.1)
CHLORIDE SERPL-SCNC: 107 MMOL/L (ref 100–108)
CHOLEST SERPL-MCNC: 190 MG/DL (ref 50–200)
CO2 SERPL-SCNC: 24 MMOL/L (ref 21–32)
CREAT SERPL-MCNC: 0.62 MG/DL (ref 0.6–1.3)
GFR SERPL CREATININE-BSD FRML MDRD: 108 ML/MIN/1.73SQ M
GLUCOSE SERPL-MCNC: 81 MG/DL (ref 65–140)
HDLC SERPL-MCNC: 70 MG/DL (ref 40–60)
LDLC SERPL CALC-MCNC: 92 MG/DL (ref 0–100)
POTASSIUM SERPL-SCNC: 3.5 MMOL/L (ref 3.5–5.3)
PROT SERPL-MCNC: 7.1 G/DL (ref 6.4–8.2)
SODIUM SERPL-SCNC: 141 MMOL/L (ref 136–145)
TRIGL SERPL-MCNC: 141 MG/DL
TSH SERPL DL<=0.05 MIU/L-ACNC: 2.5 UIU/ML (ref 0.36–3.74)

## 2018-05-15 PROCEDURE — 80053 COMPREHEN METABOLIC PANEL: CPT

## 2018-05-15 PROCEDURE — 36415 COLL VENOUS BLD VENIPUNCTURE: CPT

## 2018-05-15 PROCEDURE — 84443 ASSAY THYROID STIM HORMONE: CPT

## 2018-05-15 PROCEDURE — 80061 LIPID PANEL: CPT

## 2018-05-18 ENCOUNTER — OFFICE VISIT - HEALTHEAST (OUTPATIENT)
Dept: FAMILY MEDICINE | Facility: CLINIC | Age: 48
End: 2018-05-18

## 2018-05-18 DIAGNOSIS — Z00.00 HEALTH MAINTENANCE EXAMINATION: ICD-10-CM

## 2018-05-18 DIAGNOSIS — Z12.11 COLON CANCER SCREENING: ICD-10-CM

## 2018-05-18 DIAGNOSIS — E78.5 HYPERLIPIDEMIA: ICD-10-CM

## 2018-05-18 ASSESSMENT — MIFFLIN-ST. JEOR: SCORE: 1247.98

## 2018-05-21 ENCOUNTER — COMMUNICATION - HEALTHEAST (OUTPATIENT)
Dept: FAMILY MEDICINE | Facility: CLINIC | Age: 48
End: 2018-05-21

## 2018-05-21 LAB
HPV SOURCE: NORMAL
HUMAN PAPILLOMA VIRUS 16 DNA: NEGATIVE
HUMAN PAPILLOMA VIRUS 18 DNA: NEGATIVE
HUMAN PAPILLOMA VIRUS FINAL DIAGNOSIS: NORMAL
HUMAN PAPILLOMA VIRUS OTHER HR: NEGATIVE
SPECIMEN DESCRIPTION: NORMAL

## 2018-05-25 ENCOUNTER — OFFICE VISIT (OUTPATIENT)
Dept: INTERNAL MEDICINE CLINIC | Facility: CLINIC | Age: 48
End: 2018-05-25
Payer: COMMERCIAL

## 2018-05-25 ENCOUNTER — TELEPHONE (OUTPATIENT)
Dept: INTERNAL MEDICINE CLINIC | Facility: CLINIC | Age: 48
End: 2018-05-25

## 2018-05-25 VITALS
RESPIRATION RATE: 16 BRPM | OXYGEN SATURATION: 95 % | HEART RATE: 82 BPM | BODY MASS INDEX: 31.01 KG/M2 | DIASTOLIC BLOOD PRESSURE: 78 MMHG | SYSTOLIC BLOOD PRESSURE: 126 MMHG | HEIGHT: 63 IN | WEIGHT: 175 LBS

## 2018-05-25 DIAGNOSIS — F41.9 ANXIETY: ICD-10-CM

## 2018-05-25 DIAGNOSIS — R53.83 OTHER FATIGUE: ICD-10-CM

## 2018-05-25 DIAGNOSIS — H60.8X9: ICD-10-CM

## 2018-05-25 DIAGNOSIS — B17.10 ACUTE HEPATITIS C VIRUS INFECTION WITHOUT HEPATIC COMA: ICD-10-CM

## 2018-05-25 DIAGNOSIS — E03.9 HYPOTHYROIDISM, UNSPECIFIED TYPE: Primary | ICD-10-CM

## 2018-05-25 DIAGNOSIS — F10.21 ALCOHOL DEPENDENCE IN REMISSION (HCC): ICD-10-CM

## 2018-05-25 PROBLEM — F10.20 ALCOHOL DEPENDENCE (HCC): Status: ACTIVE | Noted: 2017-12-05

## 2018-05-25 PROCEDURE — 3008F BODY MASS INDEX DOCD: CPT | Performed by: INTERNAL MEDICINE

## 2018-05-25 PROCEDURE — 99214 OFFICE O/P EST MOD 30 MIN: CPT | Performed by: INTERNAL MEDICINE

## 2018-05-25 RX ORDER — VALACYCLOVIR HYDROCHLORIDE 500 MG/1
TABLET, FILM COATED ORAL
Refills: 2 | COMMUNITY
Start: 2018-05-18 | End: 2018-05-25 | Stop reason: ALTCHOICE

## 2018-05-25 RX ORDER — ZIPRASIDONE HYDROCHLORIDE 20 MG/1
CAPSULE ORAL
Refills: 2 | COMMUNITY
Start: 2018-05-17 | End: 2019-06-24 | Stop reason: ALTCHOICE

## 2018-05-25 RX ORDER — HYDROCORTISONE AND ACETIC ACID 20.75; 10.375 MG/ML; MG/ML
3 SOLUTION AURICULAR (OTIC) 4 TIMES DAILY
Qty: 10 ML | Refills: 3 | Status: SHIPPED | OUTPATIENT
Start: 2018-05-25 | End: 2019-06-24 | Stop reason: SDUPTHER

## 2018-05-25 RX ORDER — LEVOCETIRIZINE DIHYDROCHLORIDE 5 MG/1
5 TABLET, FILM COATED ORAL DAILY
Qty: 90 TABLET | Refills: 1 | Status: SHIPPED | OUTPATIENT
Start: 2018-05-25 | End: 2019-06-02 | Stop reason: SDUPTHER

## 2018-05-25 RX ORDER — LEVOTHYROXINE SODIUM 0.07 MG/1
75 TABLET ORAL DAILY
Qty: 90 TABLET | Refills: 1 | Status: SHIPPED | OUTPATIENT
Start: 2018-05-25 | End: 2019-01-23 | Stop reason: SDUPTHER

## 2018-05-25 NOTE — PROGRESS NOTES
Assessment/Plan:           Problem List Items Addressed This Visit     Anxiety     Anxiety/depression no suicidal ideation she has ongoing symptoms she is working with Psychiatry she will continue to work with Psychiatry she does not want counseling         Alcohol dependence ([facility] [address] )     The patient does report me she has quit drinking alcohol and has remained alcohol free         Relevant Medications    ziprasidone (GEODON) 20 mg capsule    Acute hepatitis C virus infection     The patient was treated and in remission but she does report me symptoms of fatigue I will check a hepatitis C viral load to ensure the patient remains in remission  Hypothyroidism - Primary     Hypothyroidism controlled the patient is currently euthyroid I will be ordering a TSH prior to the next office visit and the patient will continue with current medical regiment; we will continue to monitor the patient's progress  Relevant Medications    levothyroxine 75 mcg tablet    Fatigue     Will check laboratories         Relevant Orders    CBC (Includes Diff/Plt) (Refl)    Vitamin B12 (Completed)    Vitamin D 25 hydroxy (Completed)    Hepatitis C Qualitative by PCR      Other Visit Diagnoses     Actinic otitis externa, unspecified chronicity, unspecified laterality        Relevant Medications    hydrocortisone-acetic acid (VOSOL-HC) otic solution    levocetirizine (XYZAL) 5 MG tablet          Return to office 6  months  call if any problems  Subjective:      Patient ID: Jaspal Powell is a 53 y o  female  HPI 53-year old female coming in for a follow up office visit regarding hypothyroidism, alcoholism, hepatitis-C, anxiety, fatigue, chronic otitis externa; The patient reports me compliant taking medications without untoward side effects the  The patient is here to review his medical condition, update me on the medical condition and the patient reports me no hospitalizations and no ER visits    Reports me chronic anxiety/depression but no suicidal ideation she is working with Psychiatry Dr Flori Benitez and has follow-up  The patient does report me symptoms of fatigue  She reports me she is not drinking alcohol in the last several months she is not having cravings  She reports me she had been drinking alcohol to treat her depression but has been able to get off  The following portions of the patient's history were reviewed and updated as appropriate: allergies, current medications, past family history, past medical history, past surgical history and problem list     Review of Systems   Constitutional: Positive for fatigue  Negative for activity change, appetite change and unexpected weight change  HENT: Negative for dental problem and postnasal drip  Eyes: Negative for visual disturbance  Respiratory: Negative for cough and shortness of breath  Cardiovascular: Negative for chest pain  Gastrointestinal: Negative for abdominal pain, diarrhea, nausea and vomiting  Neurological: Negative for dizziness, light-headedness and headaches  Hematological: Negative for adenopathy  Objective:                  No Follow-up on file  Allergies   Allergen Reactions    Citalopram Hives     Reaction Date: 27Feb2012;     Lamotrigine      Other reaction(s): Unknown Reaction       No past medical history on file  Past Surgical History:   Procedure Laterality Date    LIVER BIOPSY      Last assessed: 7/1/15     No current outpatient prescriptions on file prior to visit  No current facility-administered medications on file prior to visit        Family History   Problem Relation Age of Onset    No Known Problems Mother     Alcohol abuse Father      in recovery    Bipolar disorder Father      current episode depressed, current episode severity unspecified    COPD Father      w/acute bronchitis    Depression Father     Depression Sister      Social History     Social History    Marital status: Single     Spouse name: N/A    Number of children: N/A    Years of education: N/A     Occupational History    Not on file  Social History Main Topics    Smoking status: Never Smoker    Smokeless tobacco: Never Used    Alcohol use Not on file    Drug use: Yes    Sexual activity: Not on file     Other Topics Concern    Not on file     Social History Narrative    Bereavement         Vitals:    05/25/18 1256   BP: 126/78   BP Location: Right arm   Patient Position: Sitting   Cuff Size: Large   Pulse: 82   Resp: 16   SpO2: 95%   Weight: 79 4 kg (175 lb)   Height: 5' 3" (1 6 m)     Results for orders placed or performed in visit on 05/15/18   Comprehensive metabolic panel   Result Value Ref Range    Sodium 141 136 - 145 mmol/L    Potassium 3 5 3 5 - 5 3 mmol/L    Chloride 107 100 - 108 mmol/L    CO2 24 21 - 32 mmol/L    Anion Gap 10 4 - 13 mmol/L    BUN 12 5 - 25 mg/dL    Creatinine 0 62 0 60 - 1 30 mg/dL    Glucose 81 65 - 140 mg/dL    Calcium 8 5 8 3 - 10 1 mg/dL    AST 31 5 - 45 U/L    ALT 27 12 - 78 U/L    Alkaline Phosphatase 93 46 - 116 U/L    Total Protein 7 1 6 4 - 8 2 g/dL    Albumin 3 5 3 5 - 5 0 g/dL    Total Bilirubin 0 27 0 20 - 1 00 mg/dL    eGFR 108 ml/min/1 73sq m   Lipid Panel with Direct LDL reflex   Result Value Ref Range    Cholesterol 190 50 - 200 mg/dL    Triglycerides 141 <=150 mg/dL    HDL, Direct 70 (H) 40 - 60 mg/dL    LDL Calculated 92 0 - 100 mg/dL   TSH, 3rd generation   Result Value Ref Range    TSH 3RD GENERATON 2 500 0 358 - 3 740 uIU/mL     Weight (last 2 days)     None        Body mass index is 31 kg/m²    BP      Temp      Pulse     Resp      SpO2        Vitals:    05/25/18 1256   Weight: 79 4 kg (175 lb)     Vitals:    05/25/18 1256   Weight: 79 4 kg (175 lb)         /78 (BP Location: Right arm, Patient Position: Sitting, Cuff Size: Large)   Pulse 82   Resp 16   Ht 5' 3" (1 6 m)   Wt 79 4 kg (175 lb)   SpO2 95%   BMI 31 00 kg/m²          Physical Exam   Constitutional: She appears well-developed and well-nourished  HENT:   Head: Normocephalic  Mouth/Throat: Oropharynx is clear and moist    Eyes: Conjunctivae are normal  Pupils are equal, round, and reactive to light  Right eye exhibits no discharge  Left eye exhibits no discharge  No scleral icterus  Neck: Neck supple  Cardiovascular: Normal rate, regular rhythm, normal heart sounds and intact distal pulses  Exam reveals no gallop and no friction rub  No murmur heard  Pulmonary/Chest: Breath sounds normal  No respiratory distress  She has no wheezes  She has no rales  Abdominal: Soft  Bowel sounds are normal  She exhibits no distension and no mass  There is no tenderness  There is no rebound and no guarding  Musculoskeletal: She exhibits no edema or deformity  Lymphadenopathy:     She has no cervical adenopathy  Neurological: She is alert   Coordination normal

## 2018-05-25 NOTE — TELEPHONE ENCOUNTER
Yes have the patient come in for a Tdap, the other vaccine was a flu shot but this would be in the fall

## 2018-05-26 ENCOUNTER — APPOINTMENT (OUTPATIENT)
Dept: LAB | Facility: HOSPITAL | Age: 48
End: 2018-05-26
Payer: COMMERCIAL

## 2018-05-26 DIAGNOSIS — R53.83 OTHER FATIGUE: ICD-10-CM

## 2018-05-26 LAB
25(OH)D3 SERPL-MCNC: 40.5 NG/ML (ref 30–100)
BASOPHILS # BLD AUTO: 0.03 THOUSANDS/ΜL (ref 0–0.1)
BASOPHILS NFR BLD AUTO: 0 % (ref 0–1)
EOSINOPHIL # BLD AUTO: 0.23 THOUSAND/ΜL (ref 0–0.61)
EOSINOPHIL NFR BLD AUTO: 3 % (ref 0–6)
ERYTHROCYTE [DISTWIDTH] IN BLOOD BY AUTOMATED COUNT: 12.5 % (ref 11.6–15.1)
HCT VFR BLD AUTO: 38.7 % (ref 34.8–46.1)
HGB BLD-MCNC: 12.4 G/DL (ref 11.5–15.4)
LYMPHOCYTES # BLD AUTO: 2.11 THOUSANDS/ΜL (ref 0.6–4.47)
LYMPHOCYTES NFR BLD AUTO: 30 % (ref 14–44)
MCH RBC QN AUTO: 29.5 PG (ref 26.8–34.3)
MCHC RBC AUTO-ENTMCNC: 32 G/DL (ref 31.4–37.4)
MCV RBC AUTO: 92 FL (ref 82–98)
MONOCYTES # BLD AUTO: 0.43 THOUSAND/ΜL (ref 0.17–1.22)
MONOCYTES NFR BLD AUTO: 6 % (ref 4–12)
NEUTROPHILS # BLD AUTO: 4.15 THOUSANDS/ΜL (ref 1.85–7.62)
NEUTS SEG NFR BLD AUTO: 60 % (ref 43–75)
NRBC BLD AUTO-RTO: 0 /100 WBCS
PLATELET # BLD AUTO: 311 THOUSANDS/UL (ref 149–390)
PMV BLD AUTO: 9.1 FL (ref 8.9–12.7)
RBC # BLD AUTO: 4.21 MILLION/UL (ref 3.81–5.12)
VIT B12 SERPL-MCNC: 499 PG/ML (ref 100–900)
WBC # BLD AUTO: 6.97 THOUSAND/UL (ref 4.31–10.16)

## 2018-05-26 PROCEDURE — 36415 COLL VENOUS BLD VENIPUNCTURE: CPT

## 2018-05-26 PROCEDURE — 82306 VITAMIN D 25 HYDROXY: CPT

## 2018-05-26 PROCEDURE — 82607 VITAMIN B-12: CPT

## 2018-05-26 PROCEDURE — 85025 COMPLETE CBC W/AUTO DIFF WBC: CPT

## 2018-05-26 PROCEDURE — 87521 HEPATITIS C PROBE&RVRS TRNSC: CPT

## 2018-05-28 PROBLEM — R53.83 OTHER FATIGUE: Status: ACTIVE | Noted: 2018-05-28

## 2018-05-28 PROBLEM — E03.9 HYPOTHYROIDISM: Status: ACTIVE | Noted: 2018-05-28

## 2018-05-28 LAB — HCV RNA SERPL QL NAA+PROBE: NEGATIVE

## 2018-05-28 NOTE — ASSESSMENT & PLAN NOTE
The patient was treated and in remission but she does report me symptoms of fatigue I will check a hepatitis C viral load to ensure the patient remains in remission

## 2018-05-28 NOTE — ASSESSMENT & PLAN NOTE
Anxiety/depression no suicidal ideation she has ongoing symptoms she is working with Psychiatry she will continue to work with Psychiatry she does not want counseling

## 2018-05-29 ENCOUNTER — CLINICAL SUPPORT (OUTPATIENT)
Dept: INTERNAL MEDICINE CLINIC | Facility: CLINIC | Age: 48
End: 2018-05-29
Payer: COMMERCIAL

## 2018-05-29 DIAGNOSIS — Z23 NEED FOR TDAP VACCINATION: Primary | ICD-10-CM

## 2018-05-29 PROCEDURE — 90715 TDAP VACCINE 7 YRS/> IM: CPT | Performed by: INTERNAL MEDICINE

## 2018-05-29 PROCEDURE — 90471 IMMUNIZATION ADMIN: CPT | Performed by: INTERNAL MEDICINE

## 2018-06-06 ENCOUNTER — HOSPITAL ENCOUNTER (OUTPATIENT)
Dept: RADIOLOGY | Facility: HOSPITAL | Age: 48
Discharge: HOME | End: 2018-06-06
Payer: COMMERCIAL

## 2018-06-06 DIAGNOSIS — Z12.31 ENCOUNTER FOR SCREENING MAMMOGRAM FOR MALIGNANT NEOPLASM OF BREAST: ICD-10-CM

## 2018-06-06 PROCEDURE — 77067 SCR MAMMO BI INCL CAD: CPT

## 2018-06-23 ENCOUNTER — COMMUNICATION - HEALTHEAST (OUTPATIENT)
Dept: FAMILY MEDICINE | Facility: CLINIC | Age: 48
End: 2018-06-23

## 2018-08-07 ENCOUNTER — COMMUNICATION - HEALTHEAST (OUTPATIENT)
Dept: ONCOLOGY | Facility: HOSPITAL | Age: 48
End: 2018-08-07

## 2018-09-12 ENCOUNTER — APPOINTMENT (OUTPATIENT)
Dept: LAB | Facility: HOSPITAL | Age: 48
End: 2018-09-12
Payer: COMMERCIAL

## 2018-09-12 ENCOUNTER — TRANSCRIBE ORDERS (OUTPATIENT)
Dept: LAB | Facility: HOSPITAL | Age: 48
End: 2018-09-12

## 2018-09-12 ENCOUNTER — OFFICE VISIT - HEALTHEAST (OUTPATIENT)
Dept: ONCOLOGY | Facility: HOSPITAL | Age: 48
End: 2018-09-12

## 2018-09-12 ENCOUNTER — RECORDS - HEALTHEAST (OUTPATIENT)
Dept: ADMINISTRATIVE | Facility: OTHER | Age: 48
End: 2018-09-12

## 2018-09-12 DIAGNOSIS — C43.9 MELANOMA OF SKIN (H): ICD-10-CM

## 2018-09-12 DIAGNOSIS — Z11.3 SCREENING EXAMINATION FOR VENEREAL DISEASE: ICD-10-CM

## 2018-09-12 DIAGNOSIS — Z11.3 SCREENING EXAMINATION FOR VENEREAL DISEASE: Primary | ICD-10-CM

## 2018-09-12 DIAGNOSIS — C50.411 MALIGNANT NEOPLASM OF UPPER-OUTER QUADRANT OF RIGHT BREAST IN FEMALE, ESTROGEN RECEPTOR NEGATIVE (H): ICD-10-CM

## 2018-09-12 DIAGNOSIS — Z17.1 MALIGNANT NEOPLASM OF UPPER-OUTER QUADRANT OF RIGHT BREAST IN FEMALE, ESTROGEN RECEPTOR NEGATIVE (H): ICD-10-CM

## 2018-09-12 LAB
HBV CORE AB SER QL: NORMAL
HBV SURFACE AG SER QL: NORMAL

## 2018-09-12 PROCEDURE — 86704 HEP B CORE ANTIBODY TOTAL: CPT

## 2018-09-12 PROCEDURE — 36415 COLL VENOUS BLD VENIPUNCTURE: CPT

## 2018-09-12 PROCEDURE — 86592 SYPHILIS TEST NON-TREP QUAL: CPT

## 2018-09-12 PROCEDURE — 87389 HIV-1 AG W/HIV-1&-2 AB AG IA: CPT

## 2018-09-12 PROCEDURE — 87340 HEPATITIS B SURFACE AG IA: CPT

## 2018-09-13 LAB — RPR SER QL: NORMAL

## 2018-09-14 ENCOUNTER — OFFICE VISIT - HEALTHEAST (OUTPATIENT)
Dept: FAMILY MEDICINE | Facility: CLINIC | Age: 48
End: 2018-09-14

## 2018-09-14 DIAGNOSIS — R35.0 URINARY FREQUENCY: ICD-10-CM

## 2018-09-14 LAB
ALBUMIN UR-MCNC: NEGATIVE MG/DL
APPEARANCE UR: CLEAR
BACTERIA #/AREA URNS HPF: ABNORMAL HPF
BILIRUB UR QL STRIP: NEGATIVE
COLOR UR AUTO: YELLOW
GLUCOSE UR STRIP-MCNC: NEGATIVE MG/DL
HGB UR QL STRIP: ABNORMAL
KETONES UR STRIP-MCNC: NEGATIVE MG/DL
LEUKOCYTE ESTERASE UR QL STRIP: NEGATIVE
NITRATE UR QL: NEGATIVE
PH UR STRIP: 6 [PH] (ref 5–8)
RBC #/AREA URNS AUTO: ABNORMAL HPF
SP GR UR STRIP: >=1.03 (ref 1–1.03)
SQUAMOUS #/AREA URNS AUTO: ABNORMAL LPF
UROBILINOGEN UR STRIP-ACNC: ABNORMAL
WBC #/AREA URNS AUTO: ABNORMAL HPF

## 2018-09-14 ASSESSMENT — MIFFLIN-ST. JEOR: SCORE: 1242.31

## 2018-09-15 LAB — HIV 1+2 AB+HIV1 P24 AG SERPL QL IA: NORMAL

## 2018-09-16 LAB — BACTERIA SPEC CULT: ABNORMAL

## 2018-09-17 ENCOUNTER — AMBULATORY - HEALTHEAST (OUTPATIENT)
Dept: FAMILY MEDICINE | Facility: CLINIC | Age: 48
End: 2018-09-17

## 2018-09-17 ENCOUNTER — COMMUNICATION - HEALTHEAST (OUTPATIENT)
Dept: FAMILY MEDICINE | Facility: CLINIC | Age: 48
End: 2018-09-17

## 2018-09-17 DIAGNOSIS — R82.90 ABNORMAL URINALYSIS: ICD-10-CM

## 2018-09-18 ENCOUNTER — AMBULATORY - HEALTHEAST (OUTPATIENT)
Dept: LAB | Facility: CLINIC | Age: 48
End: 2018-09-18

## 2018-09-18 ENCOUNTER — AMBULATORY - HEALTHEAST (OUTPATIENT)
Dept: FAMILY MEDICINE | Facility: CLINIC | Age: 48
End: 2018-09-18

## 2018-09-18 DIAGNOSIS — R82.90 ABNORMAL URINALYSIS: ICD-10-CM

## 2018-09-18 LAB
ALBUMIN UR-MCNC: NEGATIVE MG/DL
APPEARANCE UR: ABNORMAL
BACTERIA #/AREA URNS HPF: ABNORMAL HPF
BILIRUB UR QL STRIP: NEGATIVE
COLOR UR AUTO: YELLOW
GLUCOSE UR STRIP-MCNC: NEGATIVE MG/DL
HGB UR QL STRIP: NEGATIVE
KETONES UR STRIP-MCNC: NEGATIVE MG/DL
LEUKOCYTE ESTERASE UR QL STRIP: NEGATIVE
NITRATE UR QL: POSITIVE
PH UR STRIP: 7 [PH] (ref 5–8)
RBC #/AREA URNS AUTO: ABNORMAL HPF
SP GR UR STRIP: 1.02 (ref 1–1.03)
SQUAMOUS #/AREA URNS AUTO: ABNORMAL LPF
UROBILINOGEN UR STRIP-ACNC: ABNORMAL
WBC #/AREA URNS AUTO: ABNORMAL HPF

## 2018-09-19 ENCOUNTER — COMMUNICATION - HEALTHEAST (OUTPATIENT)
Dept: SCHEDULING | Facility: CLINIC | Age: 48
End: 2018-09-19

## 2018-09-20 ENCOUNTER — COMMUNICATION - HEALTHEAST (OUTPATIENT)
Dept: FAMILY MEDICINE | Facility: CLINIC | Age: 48
End: 2018-09-20

## 2018-09-20 LAB — BACTERIA SPEC CULT: ABNORMAL

## 2018-09-21 ENCOUNTER — COMMUNICATION - HEALTHEAST (OUTPATIENT)
Dept: FAMILY MEDICINE | Facility: CLINIC | Age: 48
End: 2018-09-21

## 2018-09-26 ENCOUNTER — COMMUNICATION - HEALTHEAST (OUTPATIENT)
Dept: ADMINISTRATIVE | Facility: HOSPITAL | Age: 48
End: 2018-09-26

## 2018-12-07 RX ORDER — ROPINIROLE 0.25 MG/1
TABLET, FILM COATED ORAL
Refills: 2 | COMMUNITY
Start: 2018-11-20 | End: 2019-07-08 | Stop reason: ALTCHOICE

## 2018-12-07 RX ORDER — ARIPIPRAZOLE 30 MG/1
30 TABLET ORAL DAILY
Refills: 2 | COMMUNITY
Start: 2018-11-20 | End: 2019-06-24 | Stop reason: ALTCHOICE

## 2018-12-07 RX ORDER — BENZTROPINE MESYLATE 1 MG/1
TABLET ORAL
Refills: 2 | COMMUNITY
Start: 2018-11-05 | End: 2019-06-24 | Stop reason: ALTCHOICE

## 2018-12-07 RX ORDER — ARIPIPRAZOLE 20 MG/1
TABLET ORAL
Refills: 2 | COMMUNITY
Start: 2018-11-09 | End: 2019-06-24 | Stop reason: ALTCHOICE

## 2019-01-23 DIAGNOSIS — E03.9 HYPOTHYROIDISM, UNSPECIFIED TYPE: ICD-10-CM

## 2019-01-23 RX ORDER — LEVOTHYROXINE SODIUM 0.07 MG/1
TABLET ORAL
Qty: 90 TABLET | Refills: 1 | Status: SHIPPED | OUTPATIENT
Start: 2019-01-23 | End: 2019-06-24 | Stop reason: SDUPTHER

## 2019-01-29 ENCOUNTER — COMMUNICATION - HEALTHEAST (OUTPATIENT)
Dept: SCHEDULING | Facility: CLINIC | Age: 49
End: 2019-01-29

## 2019-01-29 ENCOUNTER — OFFICE VISIT - HEALTHEAST (OUTPATIENT)
Dept: INTERNAL MEDICINE | Facility: CLINIC | Age: 49
End: 2019-01-29

## 2019-01-29 DIAGNOSIS — E78.5 HYPERLIPIDEMIA: ICD-10-CM

## 2019-01-29 DIAGNOSIS — R07.89 ATYPICAL CHEST PAIN: ICD-10-CM

## 2019-01-29 DIAGNOSIS — R07.1 CHEST PAIN ON BREATHING: ICD-10-CM

## 2019-01-29 DIAGNOSIS — C50.411 MALIGNANT NEOPLASM OF UPPER-OUTER QUADRANT OF RIGHT BREAST IN FEMALE, ESTROGEN RECEPTOR NEGATIVE (H): ICD-10-CM

## 2019-01-29 DIAGNOSIS — C43.9 MELANOMA OF SKIN (H): ICD-10-CM

## 2019-01-29 DIAGNOSIS — Z17.1 MALIGNANT NEOPLASM OF UPPER-OUTER QUADRANT OF RIGHT BREAST IN FEMALE, ESTROGEN RECEPTOR NEGATIVE (H): ICD-10-CM

## 2019-01-29 LAB
ALBUMIN SERPL-MCNC: 4.5 G/DL (ref 3.5–5)
ALP SERPL-CCNC: 54 U/L (ref 45–120)
ALT SERPL W P-5'-P-CCNC: 22 U/L (ref 0–45)
ANION GAP SERPL CALCULATED.3IONS-SCNC: 13 MMOL/L (ref 5–18)
AST SERPL W P-5'-P-CCNC: 18 U/L (ref 0–40)
BILIRUB SERPL-MCNC: 0.6 MG/DL (ref 0–1)
BUN SERPL-MCNC: 17 MG/DL (ref 8–22)
CALCIUM SERPL-MCNC: 9.5 MG/DL (ref 8.5–10.5)
CHLORIDE BLD-SCNC: 104 MMOL/L (ref 98–107)
CHOLEST SERPL-MCNC: 240 MG/DL
CO2 SERPL-SCNC: 22 MMOL/L (ref 22–31)
CREAT SERPL-MCNC: 0.82 MG/DL (ref 0.6–1.1)
D DIMER PPP FEU-MCNC: 0.31 FEU UG/ML
FASTING STATUS PATIENT QL REPORTED: ABNORMAL
GFR SERPL CREATININE-BSD FRML MDRD: >60 ML/MIN/1.73M2
GLUCOSE BLD-MCNC: 88 MG/DL (ref 70–125)
HDLC SERPL-MCNC: 96 MG/DL
LDH SERPL L TO P-CCNC: 159 U/L (ref 125–220)
LDLC SERPL CALC-MCNC: 135 MG/DL
POTASSIUM BLD-SCNC: 3.8 MMOL/L (ref 3.5–5)
PROT SERPL-MCNC: 6.9 G/DL (ref 6–8)
SODIUM SERPL-SCNC: 139 MMOL/L (ref 136–145)
TRIGL SERPL-MCNC: 45 MG/DL

## 2019-01-29 ASSESSMENT — MIFFLIN-ST. JEOR: SCORE: 1269.07

## 2019-01-30 ENCOUNTER — COMMUNICATION - HEALTHEAST (OUTPATIENT)
Dept: INTERNAL MEDICINE | Facility: CLINIC | Age: 49
End: 2019-01-30

## 2019-01-31 LAB
ATRIAL RATE - MUSE: 74 BPM
DIASTOLIC BLOOD PRESSURE - MUSE: NORMAL MMHG
INTERPRETATION ECG - MUSE: NORMAL
P AXIS - MUSE: 65 DEGREES
PR INTERVAL - MUSE: 142 MS
QRS DURATION - MUSE: 92 MS
QT - MUSE: 408 MS
QTC - MUSE: 452 MS
R AXIS - MUSE: 11 DEGREES
SYSTOLIC BLOOD PRESSURE - MUSE: NORMAL MMHG
T AXIS - MUSE: 51 DEGREES
VENTRICULAR RATE- MUSE: 74 BPM

## 2019-02-05 ENCOUNTER — HOSPITAL ENCOUNTER (OUTPATIENT)
Dept: CARDIOLOGY | Facility: HOSPITAL | Age: 49
Discharge: HOME | End: 2019-02-05
Attending: PEDIATRICS

## 2019-02-05 DIAGNOSIS — R07.89 ATYPICAL CHEST PAIN: ICD-10-CM

## 2019-02-08 ENCOUNTER — COMMUNICATION - HEALTHEAST (OUTPATIENT)
Dept: INTERNAL MEDICINE | Facility: CLINIC | Age: 49
End: 2019-02-08

## 2019-02-25 ENCOUNTER — AMBULATORY - HEALTHEAST (OUTPATIENT)
Dept: INTERNAL MEDICINE | Facility: CLINIC | Age: 49
End: 2019-02-25

## 2019-02-26 ENCOUNTER — COMMUNICATION - HEALTHEAST (OUTPATIENT)
Dept: INTERNAL MEDICINE | Facility: CLINIC | Age: 49
End: 2019-02-26

## 2019-03-31 ENCOUNTER — COMMUNICATION - HEALTHEAST (OUTPATIENT)
Dept: INTERNAL MEDICINE | Facility: CLINIC | Age: 49
End: 2019-03-31

## 2019-06-02 DIAGNOSIS — H60.8X9: ICD-10-CM

## 2019-06-02 RX ORDER — LEVOCETIRIZINE DIHYDROCHLORIDE 5 MG/1
TABLET, FILM COATED ORAL
Qty: 90 TABLET | Refills: 0 | Status: SHIPPED | OUTPATIENT
Start: 2019-06-02 | End: 2019-07-08 | Stop reason: ALTCHOICE

## 2019-06-19 RX ORDER — RISPERIDONE 2 MG/1
3 TABLET, FILM COATED ORAL
COMMUNITY
Start: 2019-06-11 | End: 2020-05-28 | Stop reason: ALTCHOICE

## 2019-06-19 RX ORDER — VILAZODONE HYDROCHLORIDE 10 MG/1
10 TABLET ORAL DAILY
Refills: 2 | COMMUNITY
Start: 2019-05-29 | End: 2020-02-04 | Stop reason: SDUPTHER

## 2019-06-24 ENCOUNTER — OFFICE VISIT (OUTPATIENT)
Dept: INTERNAL MEDICINE CLINIC | Facility: CLINIC | Age: 49
End: 2019-06-24
Payer: COMMERCIAL

## 2019-06-24 VITALS
WEIGHT: 185.8 LBS | DIASTOLIC BLOOD PRESSURE: 98 MMHG | BODY MASS INDEX: 34.19 KG/M2 | OXYGEN SATURATION: 97 % | SYSTOLIC BLOOD PRESSURE: 138 MMHG | HEART RATE: 89 BPM | HEIGHT: 62 IN | TEMPERATURE: 98.3 F

## 2019-06-24 DIAGNOSIS — F10.20 UNCOMPLICATED ALCOHOL DEPENDENCE (HCC): ICD-10-CM

## 2019-06-24 DIAGNOSIS — E03.9 HYPOTHYROIDISM, UNSPECIFIED TYPE: ICD-10-CM

## 2019-06-24 DIAGNOSIS — Z01.419 ENCOUNTER FOR GYNECOLOGICAL EXAMINATION WITHOUT ABNORMAL FINDING: ICD-10-CM

## 2019-06-24 DIAGNOSIS — Z12.31 ENCOUNTER FOR SCREENING MAMMOGRAM FOR BREAST CANCER: ICD-10-CM

## 2019-06-24 DIAGNOSIS — H60.8X9: ICD-10-CM

## 2019-06-24 DIAGNOSIS — Z00.00 MEDICARE ANNUAL WELLNESS VISIT, SUBSEQUENT: ICD-10-CM

## 2019-06-24 DIAGNOSIS — R10.10 PAIN OF UPPER ABDOMEN: Primary | ICD-10-CM

## 2019-06-24 DIAGNOSIS — E66.09 CLASS 1 OBESITY DUE TO EXCESS CALORIES WITHOUT SERIOUS COMORBIDITY WITH BODY MASS INDEX (BMI) OF 33.0 TO 33.9 IN ADULT: ICD-10-CM

## 2019-06-24 DIAGNOSIS — Z13.6 SCREENING FOR CARDIOVASCULAR CONDITION: ICD-10-CM

## 2019-06-24 PROBLEM — E66.811 CLASS 1 OBESITY DUE TO EXCESS CALORIES WITHOUT SERIOUS COMORBIDITY IN ADULT: Status: ACTIVE | Noted: 2019-06-24

## 2019-06-24 PROCEDURE — G0439 PPPS, SUBSEQ VISIT: HCPCS | Performed by: INTERNAL MEDICINE

## 2019-06-24 PROCEDURE — 99214 OFFICE O/P EST MOD 30 MIN: CPT | Performed by: INTERNAL MEDICINE

## 2019-06-24 PROCEDURE — 3008F BODY MASS INDEX DOCD: CPT | Performed by: INTERNAL MEDICINE

## 2019-06-24 RX ORDER — HYDROCORTISONE AND ACETIC ACID 20.75; 10.375 MG/ML; MG/ML
3 SOLUTION AURICULAR (OTIC) 4 TIMES DAILY
Qty: 10 ML | Refills: 3 | Status: SHIPPED | OUTPATIENT
Start: 2019-06-24 | End: 2020-02-04 | Stop reason: SDUPTHER

## 2019-06-24 RX ORDER — LEVOTHYROXINE SODIUM 0.07 MG/1
75 TABLET ORAL DAILY
Qty: 90 TABLET | Refills: 1 | Status: SHIPPED | OUTPATIENT
Start: 2019-06-24 | End: 2020-01-05

## 2019-06-26 ENCOUNTER — APPOINTMENT (OUTPATIENT)
Dept: LAB | Facility: HOSPITAL | Age: 49
End: 2019-06-26
Payer: COMMERCIAL

## 2019-06-26 DIAGNOSIS — E03.9 HYPOTHYROIDISM, UNSPECIFIED TYPE: ICD-10-CM

## 2019-06-26 DIAGNOSIS — R10.10 PAIN OF UPPER ABDOMEN: ICD-10-CM

## 2019-06-26 DIAGNOSIS — Z13.6 SCREENING FOR CARDIOVASCULAR CONDITION: ICD-10-CM

## 2019-06-26 LAB
ALBUMIN SERPL BCP-MCNC: 3.9 G/DL (ref 3.5–5)
ALP SERPL-CCNC: 121 U/L (ref 46–116)
ALT SERPL W P-5'-P-CCNC: 31 U/L (ref 12–78)
ANION GAP SERPL CALCULATED.3IONS-SCNC: 7 MMOL/L (ref 4–13)
AST SERPL W P-5'-P-CCNC: 27 U/L (ref 5–45)
BASOPHILS # BLD AUTO: 0.03 THOUSANDS/ΜL (ref 0–0.1)
BASOPHILS NFR BLD AUTO: 0 % (ref 0–1)
BILIRUB SERPL-MCNC: 0.98 MG/DL (ref 0.2–1)
BUN SERPL-MCNC: 9 MG/DL (ref 5–25)
CALCIUM SERPL-MCNC: 8.7 MG/DL (ref 8.3–10.1)
CHLORIDE SERPL-SCNC: 107 MMOL/L (ref 100–108)
CHOLEST SERPL-MCNC: 178 MG/DL (ref 50–200)
CO2 SERPL-SCNC: 23 MMOL/L (ref 21–32)
CREAT SERPL-MCNC: 0.8 MG/DL (ref 0.6–1.3)
EOSINOPHIL # BLD AUTO: 0.09 THOUSAND/ΜL (ref 0–0.61)
EOSINOPHIL NFR BLD AUTO: 1 % (ref 0–6)
ERYTHROCYTE [DISTWIDTH] IN BLOOD BY AUTOMATED COUNT: 13 % (ref 11.6–15.1)
GFR SERPL CREATININE-BSD FRML MDRD: 87 ML/MIN/1.73SQ M
GLUCOSE P FAST SERPL-MCNC: 86 MG/DL (ref 65–99)
HCT VFR BLD AUTO: 45.6 % (ref 34.8–46.1)
HDLC SERPL-MCNC: 65 MG/DL (ref 40–60)
HGB BLD-MCNC: 14.4 G/DL (ref 11.5–15.4)
IMM GRANULOCYTES # BLD AUTO: 0.02 THOUSAND/UL (ref 0–0.2)
IMM GRANULOCYTES NFR BLD AUTO: 0 % (ref 0–2)
LDLC SERPL CALC-MCNC: 103 MG/DL (ref 0–100)
LIPASE SERPL-CCNC: 92 U/L (ref 73–393)
LYMPHOCYTES # BLD AUTO: 1.78 THOUSANDS/ΜL (ref 0.6–4.47)
LYMPHOCYTES NFR BLD AUTO: 24 % (ref 14–44)
MCH RBC QN AUTO: 27.5 PG (ref 26.8–34.3)
MCHC RBC AUTO-ENTMCNC: 31.6 G/DL (ref 31.4–37.4)
MCV RBC AUTO: 87 FL (ref 82–98)
MONOCYTES # BLD AUTO: 0.51 THOUSAND/ΜL (ref 0.17–1.22)
MONOCYTES NFR BLD AUTO: 7 % (ref 4–12)
NEUTROPHILS # BLD AUTO: 5.08 THOUSANDS/ΜL (ref 1.85–7.62)
NEUTS SEG NFR BLD AUTO: 68 % (ref 43–75)
NRBC BLD AUTO-RTO: 0 /100 WBCS
PLATELET # BLD AUTO: 360 THOUSANDS/UL (ref 149–390)
PMV BLD AUTO: 9.6 FL (ref 8.9–12.7)
POTASSIUM SERPL-SCNC: 3.7 MMOL/L (ref 3.5–5.3)
PROT SERPL-MCNC: 8 G/DL (ref 6.4–8.2)
RBC # BLD AUTO: 5.23 MILLION/UL (ref 3.81–5.12)
SODIUM SERPL-SCNC: 137 MMOL/L (ref 136–145)
TRIGL SERPL-MCNC: 52 MG/DL
TSH SERPL DL<=0.05 MIU/L-ACNC: 2.41 UIU/ML (ref 0.36–3.74)
WBC # BLD AUTO: 7.51 THOUSAND/UL (ref 4.31–10.16)

## 2019-06-26 PROCEDURE — 83690 ASSAY OF LIPASE: CPT

## 2019-06-26 PROCEDURE — 80053 COMPREHEN METABOLIC PANEL: CPT

## 2019-06-26 PROCEDURE — 84443 ASSAY THYROID STIM HORMONE: CPT

## 2019-06-26 PROCEDURE — 36415 COLL VENOUS BLD VENIPUNCTURE: CPT

## 2019-06-26 PROCEDURE — 85025 COMPLETE CBC W/AUTO DIFF WBC: CPT

## 2019-06-26 PROCEDURE — 80061 LIPID PANEL: CPT

## 2019-06-27 ENCOUNTER — HOSPITAL ENCOUNTER (OUTPATIENT)
Dept: RADIOLOGY | Facility: HOSPITAL | Age: 49
Discharge: HOME | End: 2019-06-27
Payer: COMMERCIAL

## 2019-06-27 VITALS — HEIGHT: 62 IN | WEIGHT: 185 LBS | BODY MASS INDEX: 34.04 KG/M2

## 2019-06-27 DIAGNOSIS — Z12.31 ENCOUNTER FOR SCREENING MAMMOGRAM FOR BREAST CANCER: ICD-10-CM

## 2019-06-27 DIAGNOSIS — R74.8 ELEVATED ALKALINE PHOSPHATASE LEVEL: Primary | ICD-10-CM

## 2019-06-27 DIAGNOSIS — R10.10 PAIN OF UPPER ABDOMEN: ICD-10-CM

## 2019-06-27 PROCEDURE — 77067 SCR MAMMO BI INCL CAD: CPT

## 2019-06-27 PROCEDURE — 76700 US EXAM ABDOM COMPLETE: CPT

## 2019-06-28 ENCOUNTER — APPOINTMENT (OUTPATIENT)
Dept: LAB | Facility: HOSPITAL | Age: 49
End: 2019-06-28
Payer: COMMERCIAL

## 2019-06-28 DIAGNOSIS — R74.8 ELEVATED ALKALINE PHOSPHATASE LEVEL: ICD-10-CM

## 2019-06-28 LAB — GGT SERPL-CCNC: 10 U/L (ref 5–85)

## 2019-06-28 PROCEDURE — 36415 COLL VENOUS BLD VENIPUNCTURE: CPT

## 2019-06-28 PROCEDURE — 82977 ASSAY OF GGT: CPT

## 2019-07-01 DIAGNOSIS — K80.20 GALLSTONES: Primary | ICD-10-CM

## 2019-07-08 PROBLEM — K80.10 CALCULUS OF GALLBLADDER WITH CHRONIC CHOLECYSTITIS WITHOUT OBSTRUCTION: Status: ACTIVE | Noted: 2019-07-08

## 2019-07-11 RX ORDER — BIOTIN 1 MG
TABLET ORAL
COMMUNITY

## 2019-07-11 RX ORDER — LEVOCETIRIZINE DIHYDROCHLORIDE 5 MG/1
5 TABLET, FILM COATED ORAL EVERY EVENING
COMMUNITY
End: 2020-01-28

## 2019-07-11 NOTE — PRE-PROCEDURE INSTRUCTIONS
Pre-Surgery Instructions:   Medication Instructions    Cholecalciferol (VITAMIN D3) 1000 units CAPS Instructed patient per Anesthesia Guidelines   hydrocortisone-acetic acid (VOSOL-HC) otic solution Instructed patient per Anesthesia Guidelines   levocetirizine (XYZAL) 5 MG tablet Instructed patient per Anesthesia Guidelines   levothyroxine 75 mcg tablet Instructed patient per Anesthesia Guidelines   risperiDONE (RISPERDAL) 2 mg tablet Instructed patient per Anesthesia Guidelines   VIIBRYD 10 MG tablet Instructed patient per Anesthesia Guidelines  Pre-op and bathing instructions given  Patient has hibiclens from surgeons office

## 2019-07-14 PROBLEM — R10.10 PAIN OF UPPER ABDOMEN: Status: RESOLVED | Noted: 2019-06-24 | Resolved: 2019-07-14

## 2019-07-16 ENCOUNTER — ANESTHESIA EVENT (OUTPATIENT)
Dept: PERIOP | Facility: HOSPITAL | Age: 49
End: 2019-07-16
Payer: COMMERCIAL

## 2019-07-16 NOTE — ANESTHESIA PREPROCEDURE EVALUATION
Review of Systems/Medical History  Patient summary reviewed        Cardiovascular  Negative cardio ROS    Pulmonary  Negative pulmonary ROS        GI/Hepatic    Liver disease , Hepatitis C and Chronic,        Negative  ROS        Endo/Other  History of thyroid disease , hypothyroidism,   Comment: etoh use    GYN  Negative gynecology ROS          Hematology  Negative hematology ROS      Musculoskeletal  Negative musculoskeletal ROS        Neurology  Negative neurology ROS      Psychology   Anxiety, Depression , bipolar disorder,              Physical Exam    Airway    Mallampati score: II  TM Distance: >3 FB  Neck ROM: full     Dental       Cardiovascular  Comment: Negative ROS, Cardiovascular exam normal    Pulmonary  Pulmonary exam normal     Other Findings        Anesthesia Plan  ASA Score- 2     Anesthesia Type- general with ASA Monitors  Additional Monitors:   Airway Plan:         Plan Factors-    Induction- intravenous  Postoperative Plan-     Informed Consent- Anesthetic plan and risks discussed with patient  I personally reviewed this patient with the CRNA  Discussed and agreed on the Anesthesia Plan with the CRNA  Nguyen Orlando

## 2019-07-17 ENCOUNTER — HOSPITAL ENCOUNTER (OUTPATIENT)
Facility: HOSPITAL | Age: 49
Setting detail: OUTPATIENT SURGERY
Discharge: HOME | End: 2019-07-17
Attending: SURGERY | Admitting: SURGERY
Payer: COMMERCIAL

## 2019-07-17 ENCOUNTER — ANESTHESIA (OUTPATIENT)
Dept: PERIOP | Facility: HOSPITAL | Age: 49
End: 2019-07-17
Payer: COMMERCIAL

## 2019-07-17 VITALS
OXYGEN SATURATION: 92 % | HEIGHT: 62 IN | WEIGHT: 185 LBS | HEART RATE: 90 BPM | TEMPERATURE: 98 F | RESPIRATION RATE: 18 BRPM | DIASTOLIC BLOOD PRESSURE: 62 MMHG | BODY MASS INDEX: 34.04 KG/M2 | SYSTOLIC BLOOD PRESSURE: 135 MMHG

## 2019-07-17 DIAGNOSIS — K80.10 CALCULUS OF GALLBLADDER WITH CHRONIC CHOLECYSTITIS WITHOUT OBSTRUCTION: ICD-10-CM

## 2019-07-17 LAB
EXT PREGNANCY TEST URINE: NEGATIVE
EXT. CONTROL: NORMAL

## 2019-07-17 PROCEDURE — 47562 LAPAROSCOPIC CHOLECYSTECTOMY: CPT | Performed by: SURGERY

## 2019-07-17 PROCEDURE — 88304 TISSUE EXAM BY PATHOLOGIST: CPT | Performed by: PATHOLOGY

## 2019-07-17 PROCEDURE — 81025 URINE PREGNANCY TEST: CPT | Performed by: STUDENT IN AN ORGANIZED HEALTH CARE EDUCATION/TRAINING PROGRAM

## 2019-07-17 RX ORDER — HYDROMORPHONE HCL/PF 1 MG/ML
0.5 SYRINGE (ML) INJECTION
Status: DISCONTINUED | OUTPATIENT
Start: 2019-07-17 | End: 2019-07-17 | Stop reason: HOSPADM

## 2019-07-17 RX ORDER — OXYCODONE HYDROCHLORIDE AND ACETAMINOPHEN 5; 325 MG/1; MG/1
1 TABLET ORAL EVERY 4 HOURS PRN
Status: DISCONTINUED | OUTPATIENT
Start: 2019-07-17 | End: 2019-07-17 | Stop reason: HOSPADM

## 2019-07-17 RX ORDER — MAGNESIUM HYDROXIDE 1200 MG/15ML
LIQUID ORAL AS NEEDED
Status: DISCONTINUED | OUTPATIENT
Start: 2019-07-17 | End: 2019-07-17 | Stop reason: HOSPADM

## 2019-07-17 RX ORDER — LIDOCAINE HYDROCHLORIDE 10 MG/ML
INJECTION, SOLUTION INFILTRATION; PERINEURAL AS NEEDED
Status: DISCONTINUED | OUTPATIENT
Start: 2019-07-17 | End: 2019-07-17 | Stop reason: SURG

## 2019-07-17 RX ORDER — FENTANYL CITRATE/PF 50 MCG/ML
25 SYRINGE (ML) INJECTION
Status: DISCONTINUED | OUTPATIENT
Start: 2019-07-17 | End: 2019-07-17 | Stop reason: HOSPADM

## 2019-07-17 RX ORDER — CEFAZOLIN SODIUM 2 G/50ML
2000 SOLUTION INTRAVENOUS ONCE
Status: COMPLETED | OUTPATIENT
Start: 2019-07-17 | End: 2019-07-17

## 2019-07-17 RX ORDER — FENTANYL CITRATE 50 UG/ML
INJECTION, SOLUTION INTRAMUSCULAR; INTRAVENOUS AS NEEDED
Status: DISCONTINUED | OUTPATIENT
Start: 2019-07-17 | End: 2019-07-17 | Stop reason: SURG

## 2019-07-17 RX ORDER — MIDAZOLAM HYDROCHLORIDE 1 MG/ML
INJECTION INTRAMUSCULAR; INTRAVENOUS AS NEEDED
Status: DISCONTINUED | OUTPATIENT
Start: 2019-07-17 | End: 2019-07-17 | Stop reason: SURG

## 2019-07-17 RX ORDER — SODIUM CHLORIDE, SODIUM LACTATE, POTASSIUM CHLORIDE, CALCIUM CHLORIDE 600; 310; 30; 20 MG/100ML; MG/100ML; MG/100ML; MG/100ML
125 INJECTION, SOLUTION INTRAVENOUS CONTINUOUS
Status: DISCONTINUED | OUTPATIENT
Start: 2019-07-17 | End: 2019-07-17 | Stop reason: HOSPADM

## 2019-07-17 RX ORDER — ONDANSETRON 2 MG/ML
4 INJECTION INTRAMUSCULAR; INTRAVENOUS ONCE AS NEEDED
Status: DISCONTINUED | OUTPATIENT
Start: 2019-07-17 | End: 2019-07-17 | Stop reason: HOSPADM

## 2019-07-17 RX ORDER — NEOSTIGMINE METHYLSULFATE 1 MG/ML
INJECTION INTRAVENOUS AS NEEDED
Status: DISCONTINUED | OUTPATIENT
Start: 2019-07-17 | End: 2019-07-17 | Stop reason: SURG

## 2019-07-17 RX ORDER — GLYCOPYRROLATE 0.2 MG/ML
INJECTION INTRAMUSCULAR; INTRAVENOUS AS NEEDED
Status: DISCONTINUED | OUTPATIENT
Start: 2019-07-17 | End: 2019-07-17 | Stop reason: SURG

## 2019-07-17 RX ORDER — DEXAMETHASONE SODIUM PHOSPHATE 10 MG/ML
INJECTION, SOLUTION INTRAMUSCULAR; INTRAVENOUS AS NEEDED
Status: DISCONTINUED | OUTPATIENT
Start: 2019-07-17 | End: 2019-07-17 | Stop reason: SURG

## 2019-07-17 RX ORDER — HYDRALAZINE HYDROCHLORIDE 20 MG/ML
INJECTION INTRAMUSCULAR; INTRAVENOUS AS NEEDED
Status: DISCONTINUED | OUTPATIENT
Start: 2019-07-17 | End: 2019-07-17 | Stop reason: SURG

## 2019-07-17 RX ORDER — ONDANSETRON 2 MG/ML
INJECTION INTRAMUSCULAR; INTRAVENOUS AS NEEDED
Status: DISCONTINUED | OUTPATIENT
Start: 2019-07-17 | End: 2019-07-17 | Stop reason: SURG

## 2019-07-17 RX ORDER — ROCURONIUM BROMIDE 10 MG/ML
INJECTION, SOLUTION INTRAVENOUS AS NEEDED
Status: DISCONTINUED | OUTPATIENT
Start: 2019-07-17 | End: 2019-07-17 | Stop reason: SURG

## 2019-07-17 RX ORDER — PROPOFOL 10 MG/ML
INJECTION, EMULSION INTRAVENOUS AS NEEDED
Status: DISCONTINUED | OUTPATIENT
Start: 2019-07-17 | End: 2019-07-17 | Stop reason: SURG

## 2019-07-17 RX ORDER — ONDANSETRON 2 MG/ML
4 INJECTION INTRAMUSCULAR; INTRAVENOUS EVERY 4 HOURS PRN
Status: DISCONTINUED | OUTPATIENT
Start: 2019-07-17 | End: 2019-07-17 | Stop reason: HOSPADM

## 2019-07-17 RX ORDER — HYDROMORPHONE HCL/PF 1 MG/ML
SYRINGE (ML) INJECTION AS NEEDED
Status: DISCONTINUED | OUTPATIENT
Start: 2019-07-17 | End: 2019-07-17 | Stop reason: SURG

## 2019-07-17 RX ORDER — BUPIVACAINE HYDROCHLORIDE AND EPINEPHRINE 2.5; 5 MG/ML; UG/ML
INJECTION, SOLUTION EPIDURAL; INFILTRATION; INTRACAUDAL; PERINEURAL AS NEEDED
Status: DISCONTINUED | OUTPATIENT
Start: 2019-07-17 | End: 2019-07-17 | Stop reason: HOSPADM

## 2019-07-17 RX ORDER — METOCLOPRAMIDE HYDROCHLORIDE 5 MG/ML
10 INJECTION INTRAMUSCULAR; INTRAVENOUS ONCE AS NEEDED
Status: DISCONTINUED | OUTPATIENT
Start: 2019-07-17 | End: 2019-07-17 | Stop reason: HOSPADM

## 2019-07-17 RX ORDER — SODIUM CHLORIDE 9 MG/ML
INJECTION, SOLUTION INTRAVENOUS AS NEEDED
Status: DISCONTINUED | OUTPATIENT
Start: 2019-07-17 | End: 2019-07-17 | Stop reason: HOSPADM

## 2019-07-17 RX ADMIN — FENTANYL CITRATE 25 MCG: 50 INJECTION, SOLUTION INTRAMUSCULAR; INTRAVENOUS at 16:10

## 2019-07-17 RX ADMIN — SODIUM CHLORIDE, SODIUM LACTATE, POTASSIUM CHLORIDE, AND CALCIUM CHLORIDE: .6; .31; .03; .02 INJECTION, SOLUTION INTRAVENOUS at 14:27

## 2019-07-17 RX ADMIN — ONDANSETRON 4 MG: 2 INJECTION INTRAMUSCULAR; INTRAVENOUS at 14:39

## 2019-07-17 RX ADMIN — HYDROMORPHONE HYDROCHLORIDE 0.5 MG: 1 INJECTION, SOLUTION INTRAMUSCULAR; INTRAVENOUS; SUBCUTANEOUS at 15:49

## 2019-07-17 RX ADMIN — HYDRALAZINE HYDROCHLORIDE 5 MG: 20 INJECTION INTRAMUSCULAR; INTRAVENOUS at 15:42

## 2019-07-17 RX ADMIN — GLYCOPYRROLATE 0.7 MG: 0.2 INJECTION, SOLUTION INTRAMUSCULAR; INTRAVENOUS at 15:30

## 2019-07-17 RX ADMIN — PROPOFOL 200 MG: 10 INJECTION, EMULSION INTRAVENOUS at 14:32

## 2019-07-17 RX ADMIN — ROCURONIUM BROMIDE 40 MG: 10 INJECTION, SOLUTION INTRAVENOUS at 14:33

## 2019-07-17 RX ADMIN — SODIUM CHLORIDE, SODIUM LACTATE, POTASSIUM CHLORIDE, AND CALCIUM CHLORIDE: .6; .31; .03; .02 INJECTION, SOLUTION INTRAVENOUS at 15:31

## 2019-07-17 RX ADMIN — DEXAMETHASONE SODIUM PHOSPHATE 8 MG: 10 INJECTION, SOLUTION INTRAMUSCULAR; INTRAVENOUS at 14:38

## 2019-07-17 RX ADMIN — CEFAZOLIN SODIUM 2000 MG: 2 SOLUTION INTRAVENOUS at 14:30

## 2019-07-17 RX ADMIN — FENTANYL CITRATE 25 MCG: 50 INJECTION INTRAMUSCULAR; INTRAVENOUS at 15:33

## 2019-07-17 RX ADMIN — FENTANYL CITRATE 25 MCG: 50 INJECTION, SOLUTION INTRAMUSCULAR; INTRAVENOUS at 16:07

## 2019-07-17 RX ADMIN — GLYCOPYRROLATE 0.1 MG: 0.2 INJECTION, SOLUTION INTRAMUSCULAR; INTRAVENOUS at 14:35

## 2019-07-17 RX ADMIN — FENTANYL CITRATE 25 MCG: 50 INJECTION, SOLUTION INTRAMUSCULAR; INTRAVENOUS at 16:03

## 2019-07-17 RX ADMIN — OXYCODONE HYDROCHLORIDE AND ACETAMINOPHEN 1 TABLET: 5; 325 TABLET ORAL at 16:57

## 2019-07-17 RX ADMIN — NEOSTIGMINE METHYLSULFATE 4 MG: 1 INJECTION INTRAVENOUS at 15:30

## 2019-07-17 RX ADMIN — FENTANYL CITRATE 50 MCG: 50 INJECTION INTRAMUSCULAR; INTRAVENOUS at 14:48

## 2019-07-17 RX ADMIN — FENTANYL CITRATE 100 MCG: 50 INJECTION INTRAMUSCULAR; INTRAVENOUS at 14:31

## 2019-07-17 RX ADMIN — LIDOCAINE HYDROCHLORIDE ANHYDROUS 100 MG: 10 INJECTION, SOLUTION INFILTRATION at 14:31

## 2019-07-17 RX ADMIN — MIDAZOLAM HYDROCHLORIDE 2 MG: 1 INJECTION, SOLUTION INTRAMUSCULAR; INTRAVENOUS at 14:27

## 2019-07-17 RX ADMIN — FENTANYL CITRATE 25 MCG: 50 INJECTION INTRAMUSCULAR; INTRAVENOUS at 15:25

## 2019-07-17 NOTE — DISCHARGE INSTRUCTIONS
Diet and activity as tolerated  May shower  May drive when not taking pain medication  Please call [phone] for a follow-up office visit with Dr Gaye Pryor for 2 weeks  Please consider milk a magnesia daily in order to help prevent constipation

## 2019-07-17 NOTE — ANESTHESIA POSTPROCEDURE EVALUATION
Post-Op Assessment Note    CV Status:  Stable  Pain Score: 6    Pain management: adequate     Mental Status:  Alert and awake   Hydration Status:  Euvolemic   PONV Controlled:  Controlled   Airway Patency:  Patent   Post Op Vitals Reviewed: Yes      Staff: CRNA   Comments: 0 5 mg dilaudid given after emergence for pain          BP   161/72   Temp   97 7   Pulse  83   Resp   18   SpO2   100

## 2019-07-17 NOTE — OP NOTE
OPERATIVE REPORT  PATIENT NAME: Yobani Solis    :  1970  MRN: [id number]  Pt Location: [facility] OR ROOM 02    SURGERY DATE: 2019    Surgeon(s) and Role:     * Uyen Ray MD - Primary     * Liborio Maldonado MD - Assisting     * Lala Woodard MD - Assisting    Preop Diagnosis:  Calculus of gallbladder with chronic cholecystitis without obstruction [K80 10]    Post-Op Diagnosis Codes:     * Calculus of gallbladder with chronic cholecystitis without obstruction [K80 10]    Procedure(s) (LRB):  LAPAROSCOPIC CHOLECYSTECTOMY (N/A)    Specimen(s):  ID Type Source Tests Collected by Time Destination   1 :  Tissue Gallbladder TISSUE EXAM Uyen Ray MD 2019 1450        Estimated Blood Loss:   Minimal    Drains:  [REMOVED] NG/OG/Enteral Tube Orogastric 18 Fr Center mouth (Removed)   Number of days: 0       Anesthesia Type:   General    Operative Indications:  Calculus of gallbladder with chronic cholecystitis without obstruction [K80 10]      Operative Findings:  Independent, nonsmoker, ASA 2, wound class 2, BMI 34, weight 165, height 62    Cardiovascular  Negative cardio ROS     Pulmonary  Negative pulmonary ROS          GI/Hepatic     Liver disease , Hepatitis C and Chronic,          Negative  ROS          Endo/Other  History of thyroid disease , hypothyroidism,   Comment: etoh use     GYN  Negative gynecology ROS             Hematology  Negative hematology ROS        Musculoskeletal  Negative musculoskeletal ROS          Neurology  Negative neurology ROS        Psychology   Anxiety, Depression , bipolar disorder,                       Complications:   None    Procedure and Technique:  Yobani Solis is a 51 y o  female was brought into the operative suite and identified visually and by arm band  The patient was placed in the supine position  After sterile prep and drape a timeout was completed  Antibiotics were provided  Athrombic pumps in place      An incision was made at the umbilicus after instillation of local analgesia  With blunt dissection the peritoneum of the peritoneum was identified  A trocar was then inserted  CO2 was then insufflated with a back pressure of 15  The direct vision additional trochars are placed in the upper aspect of the abdomen  Laparoscopic visualization revealed a normal liver and normal stomach no excess peritoneal fluid  The gallbladder was pulled on lateral traction and a dome down technique was completed with electrocautery  This was carried down to the level of Soto's pouch  The cystic duct was then dissected free  Careful attention was made towards the critical anatomy at this region  The cystic duct was identified inserting into the base of the gallbladder  It was then clipped ×3 and divided  The cystic artery was clipped ×2 and divided  The gallbladder was then removed from the liver bed  The area was copiously irrigated  Hemostasis was assured  The gallbladder was then removed through the umbilical incision  Fascia was closed with 0 Vicryl suture  Subcuticular incisions were then closed  Histacryl was then applied  Patient was awakened from general anesthesia and transferred to the recovery room in stable condition  Sponge and instrument count correct ×2       I was present for the entire procedure    Patient Disposition:  PACU     SIGNATURE: Kaitlin Conway MD  DATE: July 17, 2019  TIME: 3:48 PM

## 2019-08-01 PROBLEM — K80.10 CALCULUS OF GALLBLADDER WITH CHRONIC CHOLECYSTITIS WITHOUT OBSTRUCTION: Status: RESOLVED | Noted: 2019-07-08 | Resolved: 2019-08-01

## 2019-08-08 ENCOUNTER — RECORDS - HEALTHEAST (OUTPATIENT)
Dept: ADMINISTRATIVE | Facility: OTHER | Age: 49
End: 2019-08-08

## 2019-08-13 ENCOUNTER — APPOINTMENT (OUTPATIENT)
Dept: LAB | Facility: HOSPITAL | Age: 49
End: 2019-08-13
Attending: SURGERY
Payer: COMMERCIAL

## 2019-08-13 ENCOUNTER — TRANSCRIBE ORDERS (OUTPATIENT)
Dept: LAB | Facility: HOSPITAL | Age: 49
End: 2019-08-13

## 2019-08-13 DIAGNOSIS — R10.9 STOMACH ACHE: Primary | ICD-10-CM

## 2019-08-13 LAB
ALBUMIN SERPL BCP-MCNC: 4.1 G/DL (ref 3.5–5)
ALP SERPL-CCNC: 138 U/L (ref 46–116)
ALT SERPL W P-5'-P-CCNC: 39 U/L (ref 12–78)
ANION GAP SERPL CALCULATED.3IONS-SCNC: 6 MMOL/L (ref 4–13)
AST SERPL W P-5'-P-CCNC: 49 U/L (ref 5–45)
BILIRUB SERPL-MCNC: 1.01 MG/DL (ref 0.2–1)
BUN SERPL-MCNC: 10 MG/DL (ref 5–25)
CALCIUM SERPL-MCNC: 9.1 MG/DL (ref 8.3–10.1)
CHLORIDE SERPL-SCNC: 102 MMOL/L (ref 100–108)
CO2 SERPL-SCNC: 27 MMOL/L (ref 21–32)
CREAT SERPL-MCNC: 0.82 MG/DL (ref 0.6–1.3)
GFR SERPL CREATININE-BSD FRML MDRD: 85 ML/MIN/1.73SQ M
GLUCOSE P FAST SERPL-MCNC: 82 MG/DL (ref 65–99)
POTASSIUM SERPL-SCNC: 5 MMOL/L (ref 3.5–5.3)
PROT SERPL-MCNC: 8.1 G/DL (ref 6.4–8.2)
SODIUM SERPL-SCNC: 135 MMOL/L (ref 136–145)

## 2019-08-13 PROCEDURE — 36415 COLL VENOUS BLD VENIPUNCTURE: CPT

## 2019-08-13 PROCEDURE — 80053 COMPREHEN METABOLIC PANEL: CPT

## 2019-08-19 PROBLEM — Z48.89 POSTOPERATIVE VISIT: Status: ACTIVE | Noted: 2019-08-19

## 2019-09-02 DIAGNOSIS — H60.8X9: ICD-10-CM

## 2019-09-02 RX ORDER — LEVOCETIRIZINE DIHYDROCHLORIDE 5 MG/1
TABLET, FILM COATED ORAL
Qty: 90 TABLET | Refills: 0 | Status: SHIPPED | OUTPATIENT
Start: 2019-09-02 | End: 2019-12-12 | Stop reason: SDUPTHER

## 2019-10-23 ENCOUNTER — HOSPITAL ENCOUNTER (EMERGENCY)
Facility: HOSPITAL | Age: 49
Discharge: HOME | End: 2019-10-23
Attending: EMERGENCY MEDICINE
Payer: COMMERCIAL

## 2019-10-23 VITALS
HEIGHT: 62 IN | HEART RATE: 68 BPM | RESPIRATION RATE: 23 BRPM | BODY MASS INDEX: 34.04 KG/M2 | DIASTOLIC BLOOD PRESSURE: 84 MMHG | WEIGHT: 185 LBS | SYSTOLIC BLOOD PRESSURE: 178 MMHG | OXYGEN SATURATION: 98 % | TEMPERATURE: 97.6 F

## 2019-10-23 DIAGNOSIS — K92.2 GI BLEED: ICD-10-CM

## 2019-10-23 DIAGNOSIS — R10.12 LUQ PAIN: Primary | ICD-10-CM

## 2019-10-23 LAB
ABO GROUP BLD: NORMAL
ALBUMIN SERPL BCP-MCNC: 4 G/DL (ref 3.5–5)
ALP SERPL-CCNC: 125 U/L (ref 46–116)
ALT SERPL W P-5'-P-CCNC: 37 U/L (ref 12–78)
ANION GAP SERPL CALCULATED.3IONS-SCNC: 7 MMOL/L (ref 4–13)
AST SERPL W P-5'-P-CCNC: 21 U/L (ref 5–45)
ATRIAL RATE: 65 BPM
BASOPHILS # BLD AUTO: 0.03 THOUSANDS/ΜL (ref 0–0.1)
BASOPHILS NFR BLD AUTO: 1 % (ref 0–1)
BILIRUB SERPL-MCNC: 0.51 MG/DL (ref 0.2–1)
BLD GP AB SCN SERPL QL: NEGATIVE
BUN SERPL-MCNC: 12 MG/DL (ref 5–25)
CALCIUM SERPL-MCNC: 9 MG/DL (ref 8.3–10.1)
CHLORIDE SERPL-SCNC: 106 MMOL/L (ref 100–108)
CO2 SERPL-SCNC: 25 MMOL/L (ref 21–32)
CREAT SERPL-MCNC: 0.86 MG/DL (ref 0.6–1.3)
EOSINOPHIL # BLD AUTO: 0.11 THOUSAND/ΜL (ref 0–0.61)
EOSINOPHIL NFR BLD AUTO: 2 % (ref 0–6)
ERYTHROCYTE [DISTWIDTH] IN BLOOD BY AUTOMATED COUNT: 12.8 % (ref 11.6–15.1)
EXT PREG TEST URINE: NEGATIVE
EXT. CONTROL ED NAV: NORMAL
GFR SERPL CREATININE-BSD FRML MDRD: 80 ML/MIN/1.73SQ M
GLUCOSE SERPL-MCNC: 100 MG/DL (ref 65–140)
HCT VFR BLD AUTO: 43 % (ref 34.8–46.1)
HGB BLD-MCNC: 13.7 G/DL (ref 11.5–15.4)
IMM GRANULOCYTES # BLD AUTO: 0.01 THOUSAND/UL (ref 0–0.2)
IMM GRANULOCYTES NFR BLD AUTO: 0 % (ref 0–2)
LIPASE SERPL-CCNC: 91 U/L (ref 73–393)
LYMPHOCYTES # BLD AUTO: 1.97 THOUSANDS/ΜL (ref 0.6–4.47)
LYMPHOCYTES NFR BLD AUTO: 30 % (ref 14–44)
MCH RBC QN AUTO: 28.4 PG (ref 26.8–34.3)
MCHC RBC AUTO-ENTMCNC: 31.9 G/DL (ref 31.4–37.4)
MCV RBC AUTO: 89 FL (ref 82–98)
MONOCYTES # BLD AUTO: 0.46 THOUSAND/ΜL (ref 0.17–1.22)
MONOCYTES NFR BLD AUTO: 7 % (ref 4–12)
NEUTROPHILS # BLD AUTO: 4.05 THOUSANDS/ΜL (ref 1.85–7.62)
NEUTS SEG NFR BLD AUTO: 60 % (ref 43–75)
NRBC BLD AUTO-RTO: 0 /100 WBCS
P AXIS: 37 DEGREES
PLATELET # BLD AUTO: 328 THOUSANDS/UL (ref 149–390)
PMV BLD AUTO: 9.1 FL (ref 8.9–12.7)
POTASSIUM SERPL-SCNC: 3.7 MMOL/L (ref 3.5–5.3)
PR INTERVAL: 164 MS
PROT SERPL-MCNC: 7.8 G/DL (ref 6.4–8.2)
QRS AXIS: 18 DEGREES
QRSD INTERVAL: 76 MS
QT INTERVAL: 392 MS
QTC INTERVAL: 407 MS
RBC # BLD AUTO: 4.82 MILLION/UL (ref 3.81–5.12)
RH BLD: NEGATIVE
SODIUM SERPL-SCNC: 138 MMOL/L (ref 136–145)
SPECIMEN EXPIRATION DATE: NORMAL
T WAVE AXIS: 31 DEGREES
TROPONIN I SERPL-MCNC: <0.02 NG/ML
TROPONIN I SERPL-MCNC: <0.02 NG/ML
VENTRICULAR RATE: 65 BPM
WBC # BLD AUTO: 6.63 THOUSAND/UL (ref 4.31–10.16)

## 2019-10-23 PROCEDURE — 84484 ASSAY OF TROPONIN QUANT: CPT | Performed by: EMERGENCY MEDICINE

## 2019-10-23 PROCEDURE — 83690 ASSAY OF LIPASE: CPT | Performed by: EMERGENCY MEDICINE

## 2019-10-23 PROCEDURE — 36415 COLL VENOUS BLD VENIPUNCTURE: CPT | Performed by: EMERGENCY MEDICINE

## 2019-10-23 PROCEDURE — 86850 RBC ANTIBODY SCREEN: CPT | Performed by: EMERGENCY MEDICINE

## 2019-10-23 PROCEDURE — 99284 EMERGENCY DEPT VISIT MOD MDM: CPT

## 2019-10-23 PROCEDURE — 93010 ELECTROCARDIOGRAM REPORT: CPT | Performed by: INTERNAL MEDICINE

## 2019-10-23 PROCEDURE — 85025 COMPLETE CBC W/AUTO DIFF WBC: CPT | Performed by: EMERGENCY MEDICINE

## 2019-10-23 PROCEDURE — 81025 URINE PREGNANCY TEST: CPT | Performed by: EMERGENCY MEDICINE

## 2019-10-23 PROCEDURE — 93005 ELECTROCARDIOGRAM TRACING: CPT

## 2019-10-23 PROCEDURE — 86901 BLOOD TYPING SEROLOGIC RH(D): CPT | Performed by: EMERGENCY MEDICINE

## 2019-10-23 PROCEDURE — 80053 COMPREHEN METABOLIC PANEL: CPT | Performed by: EMERGENCY MEDICINE

## 2019-10-23 PROCEDURE — 99285 EMERGENCY DEPT VISIT HI MDM: CPT | Performed by: EMERGENCY MEDICINE

## 2019-10-23 PROCEDURE — 86900 BLOOD TYPING SEROLOGIC ABO: CPT | Performed by: EMERGENCY MEDICINE

## 2019-10-23 RX ORDER — LIDOCAINE HYDROCHLORIDE 20 MG/ML
15 SOLUTION OROPHARYNGEAL ONCE
Status: COMPLETED | OUTPATIENT
Start: 2019-10-23 | End: 2019-10-23

## 2019-10-23 RX ORDER — ACETAMINOPHEN 325 MG/1
975 TABLET ORAL ONCE
Status: COMPLETED | OUTPATIENT
Start: 2019-10-23 | End: 2019-10-23

## 2019-10-23 RX ORDER — MAGNESIUM HYDROXIDE/ALUMINUM HYDROXICE/SIMETHICONE 120; 1200; 1200 MG/30ML; MG/30ML; MG/30ML
30 SUSPENSION ORAL ONCE
Status: COMPLETED | OUTPATIENT
Start: 2019-10-23 | End: 2019-10-23

## 2019-10-23 RX ORDER — PANTOPRAZOLE SODIUM 40 MG/1
40 TABLET, DELAYED RELEASE ORAL ONCE
Status: COMPLETED | OUTPATIENT
Start: 2019-10-23 | End: 2019-10-23

## 2019-10-23 RX ORDER — SUCRALFATE ORAL 1 G/10ML
1 SUSPENSION ORAL 4 TIMES DAILY
Qty: 420 ML | Refills: 0 | Status: SHIPPED | OUTPATIENT
Start: 2019-10-23 | End: 2021-03-04 | Stop reason: ALTCHOICE

## 2019-10-23 RX ORDER — PANTOPRAZOLE SODIUM 20 MG/1
40 TABLET, DELAYED RELEASE ORAL 2 TIMES DAILY
Qty: 20 TABLET | Refills: 0 | Status: SHIPPED | OUTPATIENT
Start: 2019-10-23 | End: 2020-02-04 | Stop reason: ALTCHOICE

## 2019-10-23 RX ADMIN — LIDOCAINE HYDROCHLORIDE 15 ML: 20 SOLUTION ORAL; TOPICAL at 14:57

## 2019-10-23 RX ADMIN — ACETAMINOPHEN 975 MG: 325 TABLET ORAL at 18:49

## 2019-10-23 RX ADMIN — ALUMINUM HYDROXIDE, MAGNESIUM HYDROXIDE, AND SIMETHICONE 30 ML: 200; 200; 20 SUSPENSION ORAL at 14:57

## 2019-10-23 RX ADMIN — PANTOPRAZOLE SODIUM 40 MG: 40 TABLET, DELAYED RELEASE ORAL at 16:42

## 2019-10-23 NOTE — ED ATTENDING ATTESTATION
10/23/2019  IRenee MD, saw and evaluated the patient  I have discussed the patient with the resident/non-physician practitioner and agree with the resident's/non-physician practitioner's findings, Plan of Care, and MDM as documented in the resident's/non-physician practitioner's note, except where noted  All available labs and Radiology studies were reviewed  I was present for key portions of any procedure(s) performed by the resident/non-physician practitioner and I was immediately available to provide assistance  At this point I agree with the current assessment done in the Emergency Department  I have conducted an independent evaluation of this patient a history and physical is as follows:    ED Course         Critical Care Time  Procedures     52 yo female with luq abodminal pain for one day, no n/v/d, no constipation, no blood in stools  No hx of same  Pt drinks daily  No urinary complaints, no cp, no sob, nonsmoker  Vss, afebrile, lungs cta, rrr, abdomen soft tender luq, no rebound, no guarding, heme positive  Gi cocktail, labs, type and screen  Discuss with gi

## 2019-10-23 NOTE — ED PROVIDER NOTES
ASSESSMENT AND PLAN    Pankaj Anders is a 51 y o  female with a history of alcohol abuse as noted below who presents for evaluation of left upper quadrant pain, sharp, constant all day today  On arrival, the patient is hemodynamically stable and well-appearing without acute distress, with a nontoxic appearance  On exam, the patient has a soft nontender abdomen  She has guaiac-positive stools that appear to be consistent with melena, concerning for upper GI bleed, possible gastric ulcer as an etiology of her symptoms   The physical exam is otherwise unremarkable   -Labwork largely unremarkable  Hemoglobin was 13 7  -delta troponin, delta EKG obtained due to the constant nature of her left upper quadrant pain, which was negative  Heart score is 3  -initially given a GI cocktail for pain  This did not provide significant relief, so the patient was also given Carafate, as well as Protonix  -discussed with the gastroenterology fellow on call  Stated as long as the patient is stable appearing, and hemoglobin is stable, she is appropriate for outpatient follow-up and discharge home  -will discharge home  Strict return precautions provided  Provided with outpatient follow up information to establish care with Gastroenterology  Advised return to the emergency department for bloody bowel movements, lightheadedness, dizziness, near-syncope, or fatigue  Provided with prescription for b i d  Protonix, as well as daily Carafate  Advised to cease alcohol use      History  Chief Complaint   Patient presents with    Abdominal Pain     sudden onset of left upper gastric pain, non-radiating, denies n/v/d, pt  took 2 otc aleve with no relief     HPI this is a 78-year-old female who presents for evaluation of left-sided abdominal pain  The patient describes the pain as sharp, left-sided and epigastric, nonradiating  She states it feels similar to her cholecystitis pain, however this pain was on the right side    It started earlier this morning, and has been constant ever since, without any waxing waning or intermittent symptoms  She has never had abdominal pain in this location before  The patient does have a history of psychiatric disease, however no known cardiac risk factors, and no family history of cardiac disease  She denies any fevers, chills, chest pain, shortness of breath, pleuritic chest pain, hemoptysis, cough, sputum production, nausea, vomiting, diarrhea, black tarry stools, blood in the stool, blood in the vomit, urinary symptoms  She denies any chest wall trauma, and has no recent travel, recent surgeries, recent hospitalizations, recent sick contacts  Prior to Admission Medications   Prescriptions Last Dose Informant Patient Reported? Taking?    Cholecalciferol (VITAMIN D3) 1000 units CAPS   Yes No   Sig: Take by mouth   VIIBRYD 10 MG tablet   Yes No   Sig: Take 10 mg by mouth daily   VIIBRYD 20 MG tablet   Yes No   Sig: Take 20 mg by mouth daily   hydrocortisone-acetic acid (VOSOL-HC) otic solution   No No   Sig: Administer 3 drops into both ears 4 (four) times a day   levocetirizine (XYZAL) 5 MG tablet   Yes No   Sig: Take 5 mg by mouth every evening   levocetirizine (XYZAL) 5 MG tablet   No No   Sig: TAKE 1 TABLET BY MOUTH EVERY DAY   levothyroxine 75 mcg tablet   No No   Sig: Take 1 tablet (75 mcg total) by mouth daily   risperiDONE (RISPERDAL) 2 mg tablet   Yes No   Sig: take at night      Facility-Administered Medications: None       Past Medical History:   Diagnosis Date    Anxiety     Bipolar disorder (HCC)     Depression     Disease of thyroid gland     Gallbladder calculus     Infectious viral hepatitis 2009    Hep C, treated    Wears glasses        Past Surgical History:   Procedure Laterality Date    LIVER BIOPSY      Last assessed: 7/1/15    ME LAP,CHOLECYSTECTOMY N/A 7/17/2019    Procedure: LAPAROSCOPIC CHOLECYSTECTOMY;  Surgeon: Nai Swift MD;  [facility];  Service: General    TUBAL LIGATION         Family History   Problem Relation Age of Onset   [facility] Breast cancer Mother     Alcohol abuse Father         in recovery    Bipolar disorder Father         current episode depressed, current episode severity unspecified    COPD Father         w/acute bronchitis    Depression Father     Depression Sister     No Known Problems Daughter     Breast cancer Maternal Grandmother     No Known Problems Sister     No Known Problems Maternal Aunt     No Known Problems Paternal Aunt     No Known Problems Cousin      I have reviewed and agree with the history as documented  Social History     Tobacco Use    Smoking status: Never Smoker    Smokeless tobacco: Never Used   Substance Use Topics    Alcohol use: Yes     Frequency: 4 or more times a week     Drinks per session: 3 or 4     Binge frequency: Weekly     Comment: 2-3 times per week has 6+ vodka shots    Drug use: Not Currently     Comment: 1 year of cocaine use 25 years ago, none now        Review of Systems   Constitutional: Negative for chills and fever  HENT: Negative for congestion and rhinorrhea  Eyes: Negative for photophobia and visual disturbance  Respiratory: Negative for cough and shortness of breath  Cardiovascular: Negative for chest pain and palpitations  Gastrointestinal: Positive for abdominal pain  Negative for diarrhea, nausea and vomiting  Genitourinary: Negative for dysuria and frequency  Musculoskeletal: Negative for neck pain and neck stiffness  Skin: Negative for pallor and rash  Neurological: Negative for light-headedness and headaches  All other systems reviewed and are negative        Physical Exam  ED Triage Vitals   Temperature Pulse Respirations Blood Pressure SpO2   10/23/19 1348 10/23/19 1346 10/23/19 1346 10/23/19 1346 10/23/19 1346   97 6 °F (36 4 °C) 82 18 (!) 173/99 97 %      Temp Source Heart Rate Source Patient Position - Orthostatic VS BP Location FiO2 (%)   10/23/19 1348 10/23/19 1346 10/23/19 1346 10/23/19 1346 --   Oral Monitor Sitting Right arm       Pain Score       10/23/19 1346       9             Orthostatic Vital Signs  Vitals:    10/23/19 1346 10/23/19 1556 10/23/19 1707 10/23/19 1919   BP: (!) 173/99 147/91 (!) 179/81 (!) 178/84   Pulse: 82 68 66 68   Patient Position - Orthostatic VS: Sitting  Sitting        Physical Exam   Constitutional: She is oriented to person, place, and time  Awake and alert, well appearing, no acute distress  Nontoxic in appearance  Mental status appropriate  HENT:   Head: Normocephalic and atraumatic  Mouth/Throat: Oropharynx is clear and moist  No oropharyngeal exudate  Eyes: Pupils are equal, round, and reactive to light  EOM are normal  Right eye exhibits no discharge  Left eye exhibits no discharge  No scleral icterus  Neck: Normal range of motion  Neck supple  No JVD present  No tracheal deviation present  Cardiovascular: Normal rate, regular rhythm and normal heart sounds  No murmur heard  Pulmonary/Chest: Effort normal  No stridor  No respiratory distress  She has no wheezes  She has no rales  Abdominal: Soft  She exhibits no distension and no mass  There is no tenderness  Musculoskeletal: Normal range of motion  She exhibits no edema or deformity  Neurological: She is alert and oriented to person, place, and time  No cranial nerve deficit  She exhibits normal muscle tone  Skin: Skin is warm and dry  No rash noted  No pallor         ED Medications  Medications   Lidocaine Viscous HCl (XYLOCAINE) 2 % mucosal solution 15 mL (15 mL Swish & Spit Given 10/23/19 1457)   aluminum-magnesium hydroxide-simethicone (MYLANTA) 200-200-20 mg/5 mL oral suspension 30 mL (30 mL Oral Given 10/23/19 1457)   pantoprazole (PROTONIX) EC tablet 40 mg (40 mg Oral Given 10/23/19 1642)   acetaminophen (TYLENOL) tablet 975 mg (975 mg Oral Given 10/23/19 1849)       Diagnostic Studies  Results Reviewed     Procedure Component Value Units Date/Time    Troponin I [[id number]]  (Normal) Collected:  10/23/19 1803    Lab Status:  Final result Specimen:  Blood from Arm, Left Updated:  10/23/19 1953     Troponin I <0 02 ng/mL     Troponin I [[id number]]  (Normal) Collected:  10/23/19 1540    Lab Status:  Final result Specimen:  Blood from Arm, Left Updated:  10/23/19 1616     Troponin I <0 02 ng/mL     Comprehensive metabolic panel [[id number]]  (Abnormal) Collected:  10/23/19 1540    Lab Status:  Final result Specimen:  Blood from Arm, Left Updated:  10/23/19 1615     Sodium 138 mmol/L      Potassium 3 7 mmol/L      Chloride 106 mmol/L      CO2 25 mmol/L      ANION GAP 7 mmol/L      BUN 12 mg/dL      Creatinine 0 86 mg/dL      Glucose 100 mg/dL      Calcium 9 0 mg/dL      AST 21 U/L      ALT 37 U/L      Alkaline Phosphatase 125 U/L      Total Protein 7 8 g/dL      Albumin 4 0 g/dL      Total Bilirubin 0 51 mg/dL      eGFR 80 ml/min/1 73sq m     Narrative:       [place] guidelines for Chronic Kidney Disease (CKD):     Stage 1 with normal or high GFR (GFR > 90 mL/min/1 73 square meters)    Stage 2 Mild CKD (GFR = 60-89 mL/min/1 73 square meters)    Stage 3A Moderate CKD (GFR = 45-59 mL/min/1 73 square meters)    Stage 3B Moderate CKD (GFR = 30-44 mL/min/1 73 square meters)    Stage 4 Severe CKD (GFR = 15-29 mL/min/1 73 square meters)    Stage 5 End Stage CKD (GFR <15 mL/min/1 73 square meters)  Note: GFR calculation is accurate only with a steady state creatinine    Lipase [[id number]]  (Normal) Collected:  10/23/19 1540    Lab Status:  Final result Specimen:  Blood from Arm, Left Updated:  10/23/19 1615     Lipase 91 u/L     CBC and differential [[id number]] Collected:  10/23/19 1540    Lab Status:  Final result Specimen:  Blood from Arm, Left Updated:  10/23/19 1551     WBC 6 63 Thousand/uL      RBC 4 82 Million/uL      Hemoglobin 13 7 g/dL      Hematocrit 43 0 %      MCV 89 fL      MCH 28 4 pg      MCHC 31 9 g/dL      RDW 12 8 %      MPV 9 1 fL      Platelets 215 Thousands/uL      nRBC 0 /100 WBCs      Neutrophils Relative 60 %      Immat GRANS % 0 %      Lymphocytes Relative 30 %      Monocytes Relative 7 %      Eosinophils Relative 2 %      Basophils Relative 1 %      Neutrophils Absolute 4 05 Thousands/µL      Immature Grans Absolute 0 01 Thousand/uL      Lymphocytes Absolute 1 97 Thousands/µL      Monocytes Absolute 0 46 Thousand/µL      Eosinophils Absolute 0 11 Thousand/µL      Basophils Absolute 0 03 Thousands/µL     POCT pregnancy, urine [[id number]]  (Normal) Resulted:  10/23/19 1529    Lab Status:  Final result Updated:  10/23/19 1529     EXT PREG TEST UR (Ref: Negative) Negative     Control Valid                 No orders to display         Procedures  ECG 12 Lead Documentation Only  Date/Time: 10/24/2019 5:33 PM  Performed by: Nuno Maldonado MD  Authorized by: Nuno Maldonado MD     ECG reviewed by me, the ED Provider: yes    Patient location:  ED  Previous ECG:     Previous ECG:  Unavailable    Comparison to cardiac monitor: Yes    Interpretation:     Interpretation: normal    Comments:      Sinus rhythm with a heart rate 63, normal axis, normal intervals  No prior EKGs for comparison  No ST/T-wave deviations              ED Course         HEART Risk Score      Most Recent Value   History  1 Filed at: 10/24/2019 1734   ECG  0 Filed at: 10/24/2019 1734   Age  1 Filed at: 10/24/2019 1734   Risk Factors  1 Filed at: 10/24/2019 1734   Troponin  0 Filed at: 10/24/2019 1734   Heart Score Risk Calculator   History  1 Filed at: 10/24/2019 1734   ECG  0 Filed at: 10/24/2019 1734   Age  1 Filed at: 10/24/2019 1734   Risk Factors  1 Filed at: 10/24/2019 1734   Troponin  0 Filed at: 10/24/2019 1734   HEART Score  3 Filed at: 10/24/2019 1734   HEART Score  3 Filed at: 10/24/2019 1734                            MDM    Disposition  Final diagnoses:   LUQ pain   GI bleed     Time reflects when diagnosis was documented in both MDM as applicable and the Disposition within this note     Time User Action Codes Description Comment    10/23/2019  7:32 PM Marilin Mcarthur [R10 12] LUQ pain     10/23/2019  7:32 PM Irina Mccann [K92 2] GI bleed       ED Disposition     ED Disposition Condition Date/Time Comment    Discharge Stable Wed Oct 23, 2019  7:32 PM Leda Arn discharge to home/self care  Follow-up Information     Follow up With Specialties Details Why Contact Info Additional [address] , DO Internal Medicine   [address]  [place] [phone]       [facility] Call  to schedule an appointment as soon as possible for evaluation [address] [facility] [address] [facility] [id number]  [facility] [place] [address], [address], [place], [place], [address]          Discharge Medication List as of 10/23/2019  7:55 PM      START taking these medications    Details   pantoprazole (PROTONIX) 20 mg tablet Take 2 tablets (40 mg total) by mouth 2 (two) times a day, Starting Wed 10/23/2019, Print      sucralfate (CARAFATE) 1 g/10 mL suspension Take 10 mL (1 g total) by mouth 4 (four) times a day, Starting Wed 10/23/2019, Print         CONTINUE these medications which have NOT CHANGED    Details   Cholecalciferol (VITAMIN D3) 1000 units CAPS Take by mouth, Historical Med      hydrocortisone-acetic acid (VOSOL-HC) otic solution Administer 3 drops into both ears 4 (four) times a day, Starting Mon 6/24/2019, Normal      !! levocetirizine (XYZAL) 5 MG tablet Take 5 mg by mouth every evening, Historical Med      !! levocetirizine (XYZAL) 5 MG tablet TAKE 1 TABLET BY MOUTH EVERY DAY, Normal      levothyroxine 75 mcg tablet Take 1 tablet (75 mcg total) by mouth daily, Starting Mon 6/24/2019, Normal      risperiDONE (RISPERDAL) 2 mg tablet take at night, Historical Med      !!  VIIBRYD 10 MG tablet Take 10 mg by mouth daily, Starting Wed 5/29/2019, Historical Med      !! VIIBRYD 20 MG tablet Take 20 mg by mouth daily, Starting Sun 7/28/2019, Historical Med       !! - Potential duplicate medications found  Please discuss with provider  ED Provider  Attending physically available and evaluated Su Brown  I managed the patient along with the ED Attending      Electronically Signed by         Neva Olszewski, MD  10/24/19 7614

## 2019-10-24 ENCOUNTER — TRANSITIONAL CARE MANAGEMENT (OUTPATIENT)
Dept: INTERNAL MEDICINE CLINIC | Facility: CLINIC | Age: 49
End: 2019-10-24

## 2019-10-24 LAB
ATRIAL RATE: 65 BPM
ATRIAL RATE: 69 BPM
P AXIS: 53 DEGREES
P AXIS: 54 DEGREES
PR INTERVAL: 160 MS
PR INTERVAL: 184 MS
QRS AXIS: 12 DEGREES
QRS AXIS: 36 DEGREES
QRSD INTERVAL: 78 MS
QRSD INTERVAL: 80 MS
QT INTERVAL: 394 MS
QT INTERVAL: 396 MS
QTC INTERVAL: 409 MS
QTC INTERVAL: 424 MS
T WAVE AXIS: 33 DEGREES
T WAVE AXIS: 49 DEGREES
VENTRICULAR RATE: 65 BPM
VENTRICULAR RATE: 69 BPM

## 2019-10-24 PROCEDURE — 93010 ELECTROCARDIOGRAM REPORT: CPT | Performed by: INTERNAL MEDICINE

## 2019-10-25 PROBLEM — F32.A DEPRESSION: Status: ACTIVE | Noted: 2019-10-25

## 2019-10-25 PROBLEM — E66.811 CLASS 1 OBESITY DUE TO EXCESS CALORIES WITHOUT SERIOUS COMORBIDITY WITH BODY MASS INDEX (BMI) OF 33.0 TO 33.9 IN ADULT: Status: ACTIVE | Noted: 2019-10-25

## 2019-10-25 PROBLEM — E66.09 CLASS 1 OBESITY DUE TO EXCESS CALORIES WITHOUT SERIOUS COMORBIDITY WITH BODY MASS INDEX (BMI) OF 33.0 TO 33.9 IN ADULT: Status: ACTIVE | Noted: 2019-10-25

## 2019-10-29 ENCOUNTER — COMMUNICATION - HEALTHEAST (OUTPATIENT)
Dept: ADMINISTRATIVE | Facility: HOSPITAL | Age: 49
End: 2019-10-29

## 2019-11-19 ENCOUNTER — OFFICE VISIT - HEALTHEAST (OUTPATIENT)
Dept: ONCOLOGY | Facility: HOSPITAL | Age: 49
End: 2019-11-19

## 2019-11-19 ENCOUNTER — AMBULATORY - HEALTHEAST (OUTPATIENT)
Dept: INFUSION THERAPY | Facility: HOSPITAL | Age: 49
End: 2019-11-19

## 2019-11-19 DIAGNOSIS — C50.411 MALIGNANT NEOPLASM OF UPPER-OUTER QUADRANT OF RIGHT BREAST IN FEMALE, ESTROGEN RECEPTOR NEGATIVE (H): ICD-10-CM

## 2019-11-19 DIAGNOSIS — Z17.1 MALIGNANT NEOPLASM OF UPPER-OUTER QUADRANT OF RIGHT BREAST IN FEMALE, ESTROGEN RECEPTOR NEGATIVE (H): ICD-10-CM

## 2019-11-19 LAB
ALBUMIN SERPL-MCNC: 4.4 G/DL (ref 3.5–5)
ALP SERPL-CCNC: 55 U/L (ref 45–120)
ALT SERPL W P-5'-P-CCNC: 22 U/L (ref 0–45)
ANION GAP SERPL CALCULATED.3IONS-SCNC: 6 MMOL/L (ref 5–18)
AST SERPL W P-5'-P-CCNC: 17 U/L (ref 0–40)
BILIRUB SERPL-MCNC: 0.6 MG/DL (ref 0–1)
BUN SERPL-MCNC: 14 MG/DL (ref 8–22)
CALCIUM SERPL-MCNC: 9.7 MG/DL (ref 8.5–10.5)
CHLORIDE BLD-SCNC: 105 MMOL/L (ref 98–107)
CO2 SERPL-SCNC: 30 MMOL/L (ref 22–31)
CREAT SERPL-MCNC: 0.79 MG/DL (ref 0.6–1.1)
GFR SERPL CREATININE-BSD FRML MDRD: >60 ML/MIN/1.73M2
GLUCOSE BLD-MCNC: 91 MG/DL (ref 70–125)
LDH SERPL L TO P-CCNC: 141 U/L (ref 125–220)
POTASSIUM BLD-SCNC: 4.1 MMOL/L (ref 3.5–5)
PROT SERPL-MCNC: 7.4 G/DL (ref 6–8)
SODIUM SERPL-SCNC: 141 MMOL/L (ref 136–145)

## 2019-11-19 ASSESSMENT — MIFFLIN-ST. JEOR: SCORE: 1260.45

## 2019-11-20 ENCOUNTER — COMMUNICATION - HEALTHEAST (OUTPATIENT)
Dept: ONCOLOGY | Facility: HOSPITAL | Age: 49
End: 2019-11-20

## 2019-12-12 DIAGNOSIS — H60.8X9: ICD-10-CM

## 2019-12-12 RX ORDER — LEVOCETIRIZINE DIHYDROCHLORIDE 5 MG/1
TABLET, FILM COATED ORAL
Qty: 90 TABLET | Refills: 0 | Status: SHIPPED | OUTPATIENT
Start: 2019-12-12 | End: 2020-02-04

## 2020-01-05 DIAGNOSIS — E03.9 HYPOTHYROIDISM, UNSPECIFIED TYPE: ICD-10-CM

## 2020-01-05 RX ORDER — LEVOTHYROXINE SODIUM 0.07 MG/1
TABLET ORAL
Qty: 90 TABLET | Refills: 0 | Status: SHIPPED | OUTPATIENT
Start: 2020-01-05 | End: 2020-02-04 | Stop reason: SDUPTHER

## 2020-01-25 ENCOUNTER — OFFICE VISIT (OUTPATIENT)
Dept: OBGYN CLINIC | Facility: HOSPITAL | Age: 50
End: 2020-01-25
Payer: COMMERCIAL

## 2020-01-25 ENCOUNTER — HOSPITAL ENCOUNTER (OUTPATIENT)
Dept: RADIOLOGY | Facility: HOSPITAL | Age: 50
Discharge: HOME | End: 2020-01-25
Payer: COMMERCIAL

## 2020-01-25 VITALS
WEIGHT: 190 LBS | HEART RATE: 87 BPM | BODY MASS INDEX: 34.96 KG/M2 | DIASTOLIC BLOOD PRESSURE: 84 MMHG | SYSTOLIC BLOOD PRESSURE: 139 MMHG | HEIGHT: 62 IN

## 2020-01-25 DIAGNOSIS — M79.671 PAIN IN RIGHT FOOT: ICD-10-CM

## 2020-01-25 DIAGNOSIS — R21 SKIN RASH: Primary | ICD-10-CM

## 2020-01-25 PROCEDURE — 73630 X-RAY EXAM OF FOOT: CPT

## 2020-01-25 PROCEDURE — 99203 OFFICE O/P NEW LOW 30 MIN: CPT | Performed by: PHYSICIAN ASSISTANT

## 2020-01-25 NOTE — PROGRESS NOTES
Assessment/Plan   Diagnoses and all orders for this visit:    Skin rash, foot  -     Not an orthopedic problem  -     Could see dermatology but I believe it would be best to start with an evaluation by your PCP  Subjective   Patient ID: Magdalena Matias is a 51 y o  female  Vitals:    01/25/20 0807   BP: 139/84   Pulse: 80     48yo female comes in for an evaluation of her right foot  For years, she has been having itching and flaking skin on the planter surface of the heel  To a lesser extent she has it on the other foot and occasionally on the palms  There was no injury  She has never had foot pain ever  She does have a PCP but has not seen them for this complaint yet        The following portions of the patient's history were reviewed and updated as appropriate: allergies, current medications, past family history, past medical history, past social history, past surgical history and problem list     Review of Systems  Ortho Exam  Past Medical History:   Diagnosis Date    Anxiety     Bipolar disorder ([address] )     Depression     Disease of thyroid gland     Gallbladder calculus     Infectious viral hepatitis 2009    Hep C, treated    Wears glasses      Past Surgical History:   Procedure Laterality Date    LIVER BIOPSY      Last assessed: 7/1/15    MS LAP,CHOLECYSTECTOMY N/A 7/17/2019    Procedure: LAPAROSCOPIC CHOLECYSTECTOMY;  Surgeon: Brandin Sanders MD;  [facility];  Service: General    TUBAL LIGATION       Family History   Problem Relation Age of Onset   [facility] Breast cancer Mother     Alcohol abuse Father         in recovery    Bipolar disorder Father         current episode depressed, current episode severity unspecified    COPD Father         w/acute bronchitis    Depression Father     Depression Sister     No Known Problems Daughter     Breast cancer Maternal Grandmother     No Known Problems Sister     No Known Problems Maternal Aunt     No Known Problems Paternal Aunt  No Known Problems Cousin      Social History     Occupational History    Not on file   Tobacco Use    Smoking status: Never Smoker    Smokeless tobacco: Never Used   Substance and Sexual Activity    Alcohol use: Yes     Frequency: 4 or more times a week     Drinks per session: 3 or 4     Binge frequency: Weekly     Comment: 2-3 times per week has 6+ vodka shots    Drug use: Not Currently     Comment: 1 year of cocaine use 25 years ago, none now    Sexual activity: Yes       Review of Systems   Constitutional: Negative  HENT: Negative  Eyes: Negative  Respiratory: Negative  Cardiovascular: Negative  Gastrointestinal: Negative  Endocrine: Negative  Genitourinary: Negative  Musculoskeletal: As below      Allergic/Immunologic: Negative  Neurological: Negative  Hematological: Negative  Psychiatric/Behavioral: Negative  Objective   Physical Exam        I have personally reviewed pertinent films in PACS and my interpretation is no fracture        · Constitutional: Awake, Alert, Oriented  · Eyes: EOMI  · Psych: Mood and affect appropriate  · Heart: regular rate and rhythm  · Lungs: No audible wheezing  · Abdomen: soft  · Lymph: no lymphedema             right foot:  - Appearance   No swelling, discoloration, deformity, or ecchymosis and + flaking skin on heel  - Palpation   No tenderness about the medial / lateral malleoli, deltoid, proximal fibula, atf, cf, ptf, talus, achilles or 5th MT  - ROM   Full, pain-free, active ROM  - Motor   normal 5/5 in all planes  - NVI distally

## 2020-02-04 ENCOUNTER — TELEPHONE (OUTPATIENT)
Dept: INTERNAL MEDICINE CLINIC | Facility: CLINIC | Age: 50
End: 2020-02-04

## 2020-02-04 ENCOUNTER — OFFICE VISIT (OUTPATIENT)
Dept: INTERNAL MEDICINE CLINIC | Facility: CLINIC | Age: 50
End: 2020-02-04
Payer: COMMERCIAL

## 2020-02-04 VITALS
HEART RATE: 83 BPM | OXYGEN SATURATION: 98 % | BODY MASS INDEX: 33.68 KG/M2 | TEMPERATURE: 97.6 F | WEIGHT: 183 LBS | HEIGHT: 62 IN | SYSTOLIC BLOOD PRESSURE: 126 MMHG | RESPIRATION RATE: 16 BRPM | DIASTOLIC BLOOD PRESSURE: 82 MMHG

## 2020-02-04 DIAGNOSIS — H60.8X9: ICD-10-CM

## 2020-02-04 DIAGNOSIS — J30.1 NON-SEASONAL ALLERGIC RHINITIS DUE TO POLLEN: ICD-10-CM

## 2020-02-04 DIAGNOSIS — E55.9 VITAMIN D DEFICIENCY: ICD-10-CM

## 2020-02-04 DIAGNOSIS — E03.9 HYPOTHYROIDISM, UNSPECIFIED TYPE: ICD-10-CM

## 2020-02-04 DIAGNOSIS — E66.09 CLASS 1 OBESITY DUE TO EXCESS CALORIES WITHOUT SERIOUS COMORBIDITY WITH BODY MASS INDEX (BMI) OF 33.0 TO 33.9 IN ADULT: ICD-10-CM

## 2020-02-04 DIAGNOSIS — R53.82 CHRONIC FATIGUE: ICD-10-CM

## 2020-02-04 DIAGNOSIS — L30.9 ECZEMA, UNSPECIFIED TYPE: Primary | ICD-10-CM

## 2020-02-04 PROCEDURE — 1036F TOBACCO NON-USER: CPT | Performed by: NURSE PRACTITIONER

## 2020-02-04 PROCEDURE — 99214 OFFICE O/P EST MOD 30 MIN: CPT | Performed by: NURSE PRACTITIONER

## 2020-02-04 RX ORDER — METRONIDAZOLE 7.5 MG/G
GEL VAGINAL
COMMUNITY
Start: 2020-01-22 | End: 2021-03-04 | Stop reason: ALTCHOICE

## 2020-02-04 RX ORDER — LEVOTHYROXINE SODIUM 0.07 MG/1
75 TABLET ORAL DAILY
Qty: 90 TABLET | Refills: 0 | Status: SHIPPED | OUTPATIENT
Start: 2020-02-04 | End: 2020-03-24 | Stop reason: SDUPTHER

## 2020-02-04 RX ORDER — CLOTRIMAZOLE AND BETAMETHASONE DIPROPIONATE 10; .64 MG/G; MG/G
CREAM TOPICAL 2 TIMES DAILY
Qty: 30 G | Refills: 0 | Status: SHIPPED | OUTPATIENT
Start: 2020-02-04 | End: 2021-03-04 | Stop reason: ALTCHOICE

## 2020-02-04 RX ORDER — MONTELUKAST SODIUM 10 MG/1
10 TABLET ORAL
Qty: 30 TABLET | Refills: 2 | Status: SHIPPED | OUTPATIENT
Start: 2020-02-04 | End: 2020-03-24 | Stop reason: SDUPTHER

## 2020-02-04 RX ORDER — HYDROCORTISONE AND ACETIC ACID 20.75; 10.375 MG/ML; MG/ML
3 SOLUTION AURICULAR (OTIC) 4 TIMES DAILY
Qty: 10 ML | Refills: 3 | Status: SHIPPED | OUTPATIENT
Start: 2020-02-04 | End: 2020-06-04 | Stop reason: SDUPTHER

## 2020-02-04 NOTE — TELEPHONE ENCOUNTER
Patient was in to see you this morning and thought you were sending a script for Singulair to the pharmacy      Please advise

## 2020-02-05 ENCOUNTER — TRANSCRIBE ORDERS (OUTPATIENT)
Dept: LAB | Facility: HOSPITAL | Age: 50
End: 2020-02-05

## 2020-02-05 ENCOUNTER — APPOINTMENT (OUTPATIENT)
Dept: LAB | Facility: HOSPITAL | Age: 50
End: 2020-02-05
Payer: COMMERCIAL

## 2020-02-05 DIAGNOSIS — E03.9 HYPOTHYROIDISM, UNSPECIFIED TYPE: ICD-10-CM

## 2020-02-05 DIAGNOSIS — R53.82 CHRONIC FATIGUE: ICD-10-CM

## 2020-02-05 DIAGNOSIS — L30.9 ECZEMA, UNSPECIFIED TYPE: ICD-10-CM

## 2020-02-05 DIAGNOSIS — E55.9 VITAMIN D DEFICIENCY: ICD-10-CM

## 2020-02-05 LAB
25(OH)D3 SERPL-MCNC: 40 NG/ML (ref 30–100)
BASOPHILS # BLD AUTO: 0.03 THOUSANDS/ΜL (ref 0–0.1)
BASOPHILS NFR BLD AUTO: 1 % (ref 0–1)
CHOLEST SERPL-MCNC: 175 MG/DL (ref 50–200)
EOSINOPHIL # BLD AUTO: 0.1 THOUSAND/ΜL (ref 0–0.61)
EOSINOPHIL NFR BLD AUTO: 2 % (ref 0–6)
ERYTHROCYTE [DISTWIDTH] IN BLOOD BY AUTOMATED COUNT: 12.2 % (ref 11.6–15.1)
FERRITIN SERPL-MCNC: 8 NG/ML (ref 8–388)
HCT VFR BLD AUTO: 43.3 % (ref 34.8–46.1)
HDLC SERPL-MCNC: 62 MG/DL
HGB BLD-MCNC: 13.6 G/DL (ref 11.5–15.4)
IMM GRANULOCYTES # BLD AUTO: 0.01 THOUSAND/UL (ref 0–0.2)
IMM GRANULOCYTES NFR BLD AUTO: 0 % (ref 0–2)
LDLC SERPL CALC-MCNC: 103 MG/DL (ref 0–100)
LYMPHOCYTES # BLD AUTO: 2.3 THOUSANDS/ΜL (ref 0.6–4.47)
LYMPHOCYTES NFR BLD AUTO: 35 % (ref 14–44)
MCH RBC QN AUTO: 28.8 PG (ref 26.8–34.3)
MCHC RBC AUTO-ENTMCNC: 31.4 G/DL (ref 31.4–37.4)
MCV RBC AUTO: 92 FL (ref 82–98)
MONOCYTES # BLD AUTO: 0.49 THOUSAND/ΜL (ref 0.17–1.22)
MONOCYTES NFR BLD AUTO: 8 % (ref 4–12)
NEUTROPHILS # BLD AUTO: 3.59 THOUSANDS/ΜL (ref 1.85–7.62)
NEUTS SEG NFR BLD AUTO: 54 % (ref 43–75)
NONHDLC SERPL-MCNC: 113 MG/DL
NRBC BLD AUTO-RTO: 0 /100 WBCS
PLATELET # BLD AUTO: 307 THOUSANDS/UL (ref 149–390)
PMV BLD AUTO: 9 FL (ref 8.9–12.7)
RBC # BLD AUTO: 4.73 MILLION/UL (ref 3.81–5.12)
TRIGL SERPL-MCNC: 48 MG/DL
VIT B12 SERPL-MCNC: 309 PG/ML (ref 100–900)
WBC # BLD AUTO: 6.52 THOUSAND/UL (ref 4.31–10.16)

## 2020-02-05 PROCEDURE — 82607 VITAMIN B-12: CPT

## 2020-02-05 PROCEDURE — 82728 ASSAY OF FERRITIN: CPT

## 2020-02-05 PROCEDURE — 80061 LIPID PANEL: CPT

## 2020-02-05 PROCEDURE — 82306 VITAMIN D 25 HYDROXY: CPT

## 2020-02-05 PROCEDURE — 85025 COMPLETE CBC W/AUTO DIFF WBC: CPT

## 2020-02-05 PROCEDURE — 36415 COLL VENOUS BLD VENIPUNCTURE: CPT

## 2020-02-07 DIAGNOSIS — E61.1 IRON DEFICIENCY: Primary | ICD-10-CM

## 2020-02-07 NOTE — RESULT ENCOUNTER NOTE
The patient would like to start with iron infusions  What would her next step be in order to start getting them? Please advise  Thank you

## 2020-02-11 PROBLEM — L30.9 ECZEMA: Status: ACTIVE | Noted: 2020-02-11

## 2020-02-11 NOTE — PROGRESS NOTES
Assessment/Plan:    Actinic otitis externa  Start cortisone ear drops      Hypothyroidism  Check tsh level    Fatigue  Check labs    Class 1 obesity due to excess calories without serious comorbidity with body mass index (BMI) of 33 0 to 33 9 in adult  BMI Counseling: Body mass index is 33 47 kg/m²  The BMI is above normal  Nutrition recommendations include reducing portion sizes, decreasing overall calorie intake and 3-5 servings of fruits/vegetables daily  Eczema  Apply lotrisone cream       Diagnoses and all orders for this visit:    Eczema, unspecified type  -     clotrimazole-betamethasone (LOTRISONE) 1-0 05 % cream; Apply topically 2 (two) times a day  -     Lipid panel; Future  -     Ambulatory referral to Dermatology; Future    Actinic otitis externa, unspecified chronicity, unspecified laterality  -     hydrocortisone-acetic acid (VOSOL-HC) otic solution; Administer 3 drops into both ears 4 (four) times a day    Hypothyroidism, unspecified type  -     levothyroxine 75 mcg tablet; Take 1 tablet (75 mcg total) by mouth daily  -     Lipid panel; Future    Chronic fatigue  -     Ferritin; Future  -     Vitamin D 25 hydroxy; Future  -     CBC and differential; Future  -     Vitamin B12; Future    Vitamin D deficiency  -     Vitamin D 25 hydroxy; Future  -     Vitamin B12; Future    Non-seasonal allergic rhinitis due to pollen  -     montelukast (SINGULAIR) 10 mg tablet; Take 1 tablet (10 mg total) by mouth daily at bedtime    Class 1 obesity due to excess calories without serious comorbidity with body mass index (BMI) of 33 0 to 33 9 in adult    Other orders  -     metroNIDAZOLE (METROGEL) 0 75 % vaginal gel          Subjective:      Patient ID: Alton Sargent is a 51 y o  female  Patient is here for right ear pain and itching x 3 days  No fever, chills, headache, discharge    Went to ER for dry foot    Complains of chronic fatigue  Has hypothyroidism   Needs bloodwork      The following portions of the patient's history were reviewed and updated as appropriate: allergies, current medications, past family history, past medical history, past social history, past surgical history and problem list     Review of Systems   Constitutional: Negative  HENT: Positive for ear pain  Eyes: Negative  Respiratory: Negative  Cardiovascular: Negative  Gastrointestinal: Negative  Musculoskeletal: Negative  Skin: Positive for rash  Neurological: Negative  Objective:      /82   Pulse 83   Temp 97 6 °F (36 4 °C) (Oral)   Resp 16   Ht 5' 2" (1 575 m)   Wt 83 kg (183 lb)   SpO2 98%   BMI 33 47 kg/m²          Physical Exam   Constitutional: She is oriented to person, place, and time  She appears well-developed and well-nourished  HENT:   Head: Normocephalic and atraumatic  Right Ear: External ear normal    Left Ear: External ear normal    Ears:    Nose: Nose normal    Mouth/Throat: Oropharynx is clear and moist    Eyes: Pupils are equal, round, and reactive to light  Conjunctivae are normal    Neck: Normal range of motion  Neck supple  Cardiovascular: Normal rate and regular rhythm  Pulmonary/Chest: Effort normal and breath sounds normal    Abdominal: Soft  Bowel sounds are normal    Musculoskeletal: Normal range of motion  Neurological: She is alert and oriented to person, place, and time  Skin: Skin is warm and dry  Nursing note and vitals reviewed

## 2020-02-11 NOTE — ASSESSMENT & PLAN NOTE
BMI Counseling: Body mass index is 33 47 kg/m²  The BMI is above normal  Nutrition recommendations include reducing portion sizes, decreasing overall calorie intake and 3-5 servings of fruits/vegetables daily

## 2020-02-17 ENCOUNTER — TELEPHONE (OUTPATIENT)
Dept: HEMATOLOGY ONCOLOGY | Facility: CLINIC | Age: 50
End: 2020-02-17

## 2020-02-17 NOTE — TELEPHONE ENCOUNTER
New Patient Encounter    New Patient Intake Form   Patient Details:  Edilberto Hernandez  1970  [id number]    Background Information:  [address] starts by opening a telephone encounter and gathering the following information   Who is calling to schedule? If not self, relationship to patient? Self   Referring Provider [facility]   What is the diagnosis? Iron deficiency   Is this diagnosis confirmed Yes   Is there a confirmed diagnosis from a biopsy/tissue reviewed by pathology? No   Is there any prior history of Cancer? No   If yes, please explain N/A   When was the diagnosis? February 2020   Is patient aware of diagnosis? Yes   Reason for visit? NP DX   Have you had any testing done? If so: when, where? Yes   Are records in [place]? yes   Was the patient told to bring a disk? no   Scheduling Information:   Preferred [place]:  [facility]     Requesting Specific Provider? Any   Are there any dates/time the patient cannot be seen? Any      Miscellaneous:   After completing the above information, please route to Financial Counselor and the appropriate Nurse Navigator for review

## 2020-02-27 ENCOUNTER — OFFICE VISIT (OUTPATIENT)
Dept: INTERNAL MEDICINE CLINIC | Facility: CLINIC | Age: 50
End: 2020-02-27
Payer: COMMERCIAL

## 2020-02-27 VITALS
OXYGEN SATURATION: 98 % | BODY MASS INDEX: 33.9 KG/M2 | WEIGHT: 184.2 LBS | DIASTOLIC BLOOD PRESSURE: 92 MMHG | SYSTOLIC BLOOD PRESSURE: 120 MMHG | HEART RATE: 90 BPM | TEMPERATURE: 98.1 F | HEIGHT: 62 IN

## 2020-02-27 DIAGNOSIS — R21 RASH: ICD-10-CM

## 2020-02-27 DIAGNOSIS — Z01.419 ENCOUNTER FOR GYNECOLOGICAL EXAMINATION WITHOUT ABNORMAL FINDING: ICD-10-CM

## 2020-02-27 DIAGNOSIS — L73.9 ACUTE FOLLICULITIS: ICD-10-CM

## 2020-02-27 DIAGNOSIS — Z80.3 FAMILY HISTORY OF BREAST CANCER: Primary | ICD-10-CM

## 2020-02-27 PROCEDURE — 99213 OFFICE O/P EST LOW 20 MIN: CPT | Performed by: INTERNAL MEDICINE

## 2020-02-27 PROCEDURE — 1036F TOBACCO NON-USER: CPT | Performed by: INTERNAL MEDICINE

## 2020-02-27 RX ORDER — MUPIROCIN CALCIUM 20 MG/G
CREAM TOPICAL 2 TIMES DAILY
Qty: 15 G | Refills: 0 | Status: SHIPPED | OUTPATIENT
Start: 2020-02-27 | End: 2020-05-27 | Stop reason: ALTCHOICE

## 2020-02-27 NOTE — PROGRESS NOTES
Assessment/Plan:    Acute folliculitis  There is a very small follicle on the areola no mass noted on examination I have provided Bactroban cream apply to affected area b i d  X7 days if not improved please notify me  Because of the family history of breast cancer mother and grandmother will check a diagnostic mammogram/ultrasound and have the patient see surgical Oncology Dr Mindi Webb for her opinion return to office  2 months  call if any problems    Rash  Bactroban cream b i d  X7 days    Encounter for gynecological examination without abnormal finding  Counseled, will the patient see OBGYN for routine examination  Family history of breast cancer  See surgical Oncology? Will she BRCA testing will let Dr Mindi Webb  make this decision         Problem List Items Addressed This Visit        Musculoskeletal and Integument    Acute folliculitis     There is a very small follicle on the areola no mass noted on examination I have provided Bactroban cream apply to affected area b i d  X7 days if not improved please notify me  Because of the family history of breast cancer mother and grandmother will check a diagnostic mammogram/ultrasound and have the patient see surgical Oncology Dr Mindi Webb for her opinion return to office  2 months  call if any problems         Relevant Medications    mupirocin (BACTROBAN) 2 % cream    Rash     Bactroban cream b i d  X7 days         Relevant Medications    mupirocin (BACTROBAN) 2 % cream       Other    Encounter for gynecological examination without abnormal finding     Counseled, will the patient see OBGYN for routine examination  Relevant Orders    Ambulatory referral to Obstetrics / Gynecology    Family history of breast cancer - Primary     See surgical Oncology?   Will she BRCA testing will let Dr Mindi Webb  make this decision         Relevant Medications    mupirocin (BACTROBAN) 2 % cream    Other Relevant Orders    US breast left limited (diagnostic)    Mammo diagnostic left w cad    Ambulatory referral to Surgical Oncology          Return to office 2  months  call if any problemsSubjective:      Patient ID: Lay Estrada is a 51 y o  female  HPI 48y/o female coming in with a chief complaint of a rash, pimple structure x2 and also itchiness of the right skin  started about a week ago is unaware of no new soaps, no new fabric softeners   She does note that she recently was diagnosed with bacterial vaginosis a history of psoriasis the she is currently on a steroid cream for this  The following portions of the patient's history were reviewed and updated as appropriate: allergies, current medications, past family history, past medical history, past social history, past surgical history and problem list     Review of Systems   Constitutional: Negative for activity change, appetite change and unexpected weight change  Eyes: Negative for visual disturbance  Respiratory: Negative for cough and shortness of breath  Cardiovascular: Negative for chest pain  Gastrointestinal: Negative for abdominal pain, diarrhea, nausea and vomiting  Skin: Positive for rash  Objective:    No follow-ups on file  No results found        Allergies   Allergen Reactions    Citalopram Hives     Reaction Date: 27Feb2012;     Lamotrigine Hives       Past Medical History:   Diagnosis Date    Anxiety     Bipolar disorder ([facility] [address] )     Depression     Disease of thyroid gland     Gallbladder calculus     Infectious viral hepatitis 2009    Hep C, treated    Wears glasses      Past Surgical History:   Procedure Laterality Date    LIVER BIOPSY      Last assessed: 7/1/15    MI LAP,CHOLECYSTECTOMY N/A 7/17/2019    Procedure: LAPAROSCOPIC CHOLECYSTECTOMY;  Surgeon: Kalee Michele MD;  [facility];  Service: General    TUBAL LIGATION       Current Outpatient Medications on File Prior to Visit   Medication Sig Dispense Refill    Cholecalciferol (VITAMIN D3) 1000 units CAPS Take by mouth  clotrimazole-betamethasone (LOTRISONE) 1-0 05 % cream Apply topically 2 (two) times a day 30 g 0    hydrocortisone-acetic acid (VOSOL-HC) otic solution Administer 3 drops into both ears 4 (four) times a day 10 mL 3    levothyroxine 75 mcg tablet Take 1 tablet (75 mcg total) by mouth daily 90 tablet 0    metroNIDAZOLE (METROGEL) 0 75 % vaginal gel       montelukast (SINGULAIR) 10 mg tablet Take 1 tablet (10 mg total) by mouth daily at bedtime 30 tablet 2    risperiDONE (RISPERDAL) 2 mg tablet 3 mg       sucralfate (CARAFATE) 1 g/10 mL suspension Take 10 mL (1 g total) by mouth 4 (four) times a day (Patient not taking: Reported on 2/4/2020) 420 mL 0    VIIBRYD 20 MG tablet Take 20 mg by mouth daily  2     No current facility-administered medications on file prior to visit        Family History   Problem Relation Age of Onset   Valaria Reil Breast cancer Mother     Alcohol abuse Father         in recovery    Bipolar disorder Father         current episode depressed, current episode severity unspecified    COPD Father         w/acute bronchitis    Depression Father     Depression Sister     No Known Problems Daughter     Breast cancer Maternal Grandmother     No Known Problems Sister     No Known Problems Maternal Aunt     No Known Problems Paternal Aunt     No Known Problems Cousin      Social History     Socioeconomic History    Marital status: Single     Spouse name: Not on file    Number of children: Not on file    Years of education: Not on file    Highest education level: Not on file   Occupational History    Not on file   Social Needs    Financial resource strain: Not on file    Food insecurity:     Worry: Not on file     Inability: Not on file    Transportation needs:     Medical: Not on file     Non-medical: Not on file   Tobacco Use    Smoking status: Never Smoker    Smokeless tobacco: Never Used   Substance and Sexual Activity    Alcohol use: Yes     Frequency: 4 or more times a week Drinks per session: 3 or 4     Binge frequency: Weekly     Comment: 2-3 times per week has 6+ vodka shots    Drug use: Not Currently     Comment: 1 year of cocaine use 25 years ago, none now    Sexual activity: Yes   Lifestyle    Physical activity:     Days per week: Not on file     Minutes per session: Not on file    Stress: Not on file   Relationships    Social connections:     Talks on phone: Not on file     Gets together: Not on file     Attends Restorationist service: Not on file     Active member of club or organization: Not on file     Attends meetings of clubs or organizations: Not on file     Relationship status: Not on file    Intimate partner violence:     Fear of current or ex partner: Not on file     Emotionally abused: Not on file     Physically abused: Not on file     Forced sexual activity: Not on file   Other Topics Concern    Not on file   Social History Narrative    Bereavement     Vitals:    02/27/20 0929   BP: 120/92   Pulse: 90   Temp: 98 1 °F (36 7 °C)   SpO2: 98%   Weight: 83 6 kg (184 lb 3 2 oz)   Height: 5' 2" (1 575 m)     Results for orders placed or performed in visit on 02/05/20   Ferritin   Result Value Ref Range    Ferritin 8 8 - 388 ng/mL   Vitamin D 25 hydroxy   Result Value Ref Range    Vit D, 25-Hydroxy 40 0 30 0 - 100 0 ng/mL   CBC and differential   Result Value Ref Range    WBC 6 52 4 31 - 10 16 Thousand/uL    RBC 4 73 3 81 - 5 12 Million/uL    Hemoglobin 13 6 11 5 - 15 4 g/dL    Hematocrit 43 3 34 8 - 46 1 %    MCV 92 82 - 98 fL    MCH 28 8 26 8 - 34 3 pg    MCHC 31 4 31 4 - 37 4 g/dL    RDW 12 2 11 6 - 15 1 %    MPV 9 0 8 9 - 12 7 fL    Platelets 099 792 - 286 Thousands/uL    nRBC 0 /100 WBCs    Neutrophils Relative 54 43 - 75 %    Immat GRANS % 0 0 - 2 %    Lymphocytes Relative 35 14 - 44 %    Monocytes Relative 8 4 - 12 %    Eosinophils Relative 2 0 - 6 %    Basophils Relative 1 0 - 1 %    Neutrophils Absolute 3 59 1 85 - 7 62 Thousands/µL    Immature Grans Absolute 0 01 0 00 - 0 20 Thousand/uL    Lymphocytes Absolute 2 30 0 60 - 4 47 Thousands/µL    Monocytes Absolute 0 49 0 17 - 1 22 Thousand/µL    Eosinophils Absolute 0 10 0 00 - 0 61 Thousand/µL    Basophils Absolute 0 03 0 00 - 0 10 Thousands/µL   Vitamin B12   Result Value Ref Range    Vitamin B-12 309 100 - 900 pg/mL   Lipid panel   Result Value Ref Range    Cholesterol 175 50 - 200 mg/dL    Triglycerides 48 <=150 mg/dL    HDL, Direct 62 >=40 mg/dL    LDL Calculated 103 (H) 0 - 100 mg/dL    Non-HDL-Chol (CHOL-HDL) 113 mg/dl     Weight (last 2 days)     Date/Time   Weight    02/27/20 0929   83 6 (184 2)            Body mass index is 33 69 kg/m²  BP      Temp      Pulse     Resp      SpO2        Vitals:    02/27/20 0929   Weight: 83 6 kg (184 lb 3 2 oz)     Vitals:    02/27/20 0929   Weight: 83 6 kg (184 lb 3 2 oz)       /92   Pulse 90   Temp 98 1 °F (36 7 °C)   Ht 5' 2" (1 575 m)   Wt 83 6 kg (184 lb 3 2 oz)   SpO2 98%   BMI 33 69 kg/m²       Medical Assistant Fiorella in the room- right areola one pustule no mass no nodule   Physical Exam   Constitutional: She appears well-developed and well-nourished  HENT:   Head: Normocephalic  Mouth/Throat: Oropharynx is clear and moist    Eyes: Pupils are equal, round, and reactive to light  Conjunctivae are normal  Right eye exhibits no discharge  Left eye exhibits no discharge  No scleral icterus  Neck: Neck supple  Cardiovascular: Normal rate, regular rhythm, normal heart sounds and intact distal pulses  Exam reveals no gallop and no friction rub  No murmur heard  Pulmonary/Chest: Breath sounds normal  No respiratory distress  She has no wheezes  She has no rales  Lymphadenopathy:     She has no cervical adenopathy  Neurological: She is alert   Coordination normal

## 2020-02-28 ENCOUNTER — COMMUNICATION - HEALTHEAST (OUTPATIENT)
Dept: FAMILY MEDICINE | Facility: CLINIC | Age: 50
End: 2020-02-28

## 2020-02-28 DIAGNOSIS — Z13.220 SCREENING FOR LIPOID DISORDERS: ICD-10-CM

## 2020-02-28 DIAGNOSIS — Z13.1 SCREENING FOR DIABETES MELLITUS: ICD-10-CM

## 2020-02-29 PROBLEM — R21 RASH: Status: ACTIVE | Noted: 2020-02-29

## 2020-02-29 PROBLEM — Z80.3 FAMILY HISTORY OF BREAST CANCER: Status: ACTIVE | Noted: 2020-02-29

## 2020-02-29 PROBLEM — L73.9 ACUTE FOLLICULITIS: Status: ACTIVE | Noted: 2020-02-29

## 2020-03-01 NOTE — ASSESSMENT & PLAN NOTE
There is a very small follicle on the areola no mass noted on examination I have provided Bactroban cream apply to affected area b i d  X7 days if not improved please notify me    Because of the family history of breast cancer mother and grandmother will check a diagnostic mammogram/ultrasound and have the patient see surgical Oncology Dr Jose E Braga for her opinion return to office  2 months  call if any problems

## 2020-03-04 ENCOUNTER — CONSULT (OUTPATIENT)
Dept: HEMATOLOGY ONCOLOGY | Facility: CLINIC | Age: 50
End: 2020-03-04
Payer: COMMERCIAL

## 2020-03-04 VITALS
HEART RATE: 95 BPM | OXYGEN SATURATION: 97 % | TEMPERATURE: 97.8 F | DIASTOLIC BLOOD PRESSURE: 74 MMHG | HEIGHT: 62 IN | SYSTOLIC BLOOD PRESSURE: 110 MMHG | RESPIRATION RATE: 16 BRPM | BODY MASS INDEX: 33.68 KG/M2 | WEIGHT: 183 LBS

## 2020-03-04 DIAGNOSIS — E61.1 IRON DEFICIENCY: ICD-10-CM

## 2020-03-04 PROCEDURE — 3008F BODY MASS INDEX DOCD: CPT | Performed by: INTERNAL MEDICINE

## 2020-03-04 PROCEDURE — 99202 OFFICE O/P NEW SF 15 MIN: CPT | Performed by: PHYSICIAN ASSISTANT

## 2020-03-04 RX ORDER — CLINDAMYCIN PHOSPHATE 20 MG/G
CREAM VAGINAL
COMMUNITY
Start: 2020-03-03 | End: 2021-03-04 | Stop reason: ALTCHOICE

## 2020-03-04 RX ORDER — SODIUM CHLORIDE 9 MG/ML
20 INJECTION, SOLUTION INTRAVENOUS ONCE
Status: CANCELLED | OUTPATIENT
Start: 2020-03-13

## 2020-03-04 RX ORDER — NYSTATIN 100000 U/G
1 CREAM TOPICAL 2 TIMES DAILY
COMMUNITY
Start: 2020-02-07

## 2020-03-04 RX ORDER — TRIAMCINOLONE ACETONIDE 1 MG/G
1 CREAM TOPICAL 2 TIMES DAILY
COMMUNITY
Start: 2020-02-07

## 2020-03-04 NOTE — PROGRESS NOTES
Oncology Outpatient Consult Note  Jd Fuchs 51 y o  female MRN: @ Encounter: [id number]        Date:  3/4/2020      Assessment/ Plan:    1  Hypoferremia without anemia  We discussed trial of oral iron  Patient states she is interested in receiving IV iron  Potential side effects of IV iron could include but may not be limited to:  change in taste, diarrhea, muscle cramps, nausea or vomiting, pain in the arms or legs, pain or burning sensation in the injection site, allergic reaction  The patient verbalized understanding and wishes to proceed  Venofer 300 milligrams x 6 doses recommended and she is agreeable  She is encouraged to make appointment with Gastroenterology  Cutting down on her alcohol consumption is encouraged    Follow-up in 6 months with repeat CBC and iron panel  CC:  "Low iron  Family doctor referred me to come here for iron deficiency "      HPI:  Jd Fuchs is a 51 y o  seen for initial consultation 3/4/2020 at the referral of [facility] regarding low iron  2/5/2020 hemoglobin 13 6, MCV of 92, white blood cell count 6 52 with 54% neutrophils, 35% lymphocytes, platelet count [id number]  Ferritin of 8  Vitamin B12 309    6/13/2017     2/4/2017  hemoglobin 12 9, MCV of 90, white blood cell count 7 9, platelet count 350261, ferritin of 10  Vitamin B12 299  She states she went to the ER approximately 6 months ago with bilateral flank pain and she had Hemoccult testing which was positive  She was referred to GI but yet has not made an appointment  She has never had EGD or colonoscopy  She does take NSAIDs approximately 3 per day  She does have episodic dyspepsia  Her menses are not heavy  She uses approximately 4 pads per day during her cycle  She does not smoke she drinks 1-2 pints of vodka per week  She denies any weight loss  She does have fatigue  Denies any night sweats fever  No cough, chest pain, shortness of breath    She denies any nausea  She denies any melena  She does have arthritis in her back  She denies any restless leg symptoms or hair changes  Denies PICA  She has not tried oral iron          Test Results:      Labs:   Lab Results   Component Value Date    HGB 13 6 02/05/2020    HCT 43 3 02/05/2020    MCV 92 02/05/2020     02/05/2020    WBC 6 52 02/05/2020    NRBC 0 02/05/2020     Lab Results   Component Value Date     07/01/2015    K 3 7 10/23/2019     10/23/2019    CO2 25 10/23/2019    ANIONGAP 14 (H) 07/01/2015    BUN 12 10/23/2019    CREATININE 0 86 10/23/2019    GLUCOSE 89 07/01/2015    GLUF 82 08/13/2019    CALCIUM 9 0 10/23/2019    AST 21 10/23/2019    ALT 37 10/23/2019    ALKPHOS 125 (H) 10/23/2019    PROT 7 9 07/01/2015    BILITOT 0 50 07/01/2015    EGFR 80 10/23/2019           Imaging:   No results found  ROS: As mentioned in HPI & Interval History otherwise 14 point ROS negative        Active Problems:   Patient Active Problem List   Diagnosis    Anxiety    Alcohol dependence ([facility] [address] [facility]    Acute hepatitis C virus infection    Hypothyroidism    Fatigue    Wellness examination    Actinic otitis externa    Encounter for screening mammogram for breast cancer    Encounter for gynecological examination without abnormal finding    Screening for cardiovascular condition    Class 1 obesity due to excess calories without serious comorbidity in adult    Medicare annual wellness visit, subsequent    Postoperative visit    Class 1 obesity due to excess calories without serious comorbidity with body mass index (BMI) of 33 0 to 33 9 in adult    Depression    Eczema    Family history of breast cancer    Acute folliculitis    Rash       Past Medical History:   Past Medical History:   Diagnosis Date    Anxiety     Bipolar disorder ([facility] [address]     Depression     Disease of thyroid gland     Gallbladder calculus     Infectious viral hepatitis 2009    Hep C, treated    Wears glasses Surgical History:   Past Surgical History:   Procedure Laterality Date    LIVER BIOPSY      Last assessed: 7/1/15    RI LAP,CHOLECYSTECTOMY N/A 7/17/2019    Procedure: Chepe Chacon;  Surgeon: Melia Novak MD;  [facility];  Service: General    TUBAL LIGATION         Family History:    Family History   Problem Relation Age of Onset   [facility] Breast cancer Mother     Alcohol abuse Father         in recovery    Bipolar disorder Father         current episode depressed, current episode severity unspecified    COPD Father         w/acute bronchitis    Depression Father     Depression Sister     No Known Problems Daughter     Breast cancer Maternal Grandmother     No Known Problems Sister     No Known Problems Maternal Aunt     No Known Problems Paternal Aunt     No Known Problems Cousin        Cancer-related family history includes Breast cancer in her maternal grandmother and mother      Social History:   Social History     Socioeconomic History    Marital status: Single     Spouse name: Not on file    Number of children: Not on file    Years of education: Not on file    Highest education level: Not on file   Occupational History    Not on file   Social Needs    Financial resource strain: Not on file    Food insecurity:     Worry: Not on file     Inability: Not on file    Transportation needs:     Medical: Not on file     Non-medical: Not on file   Tobacco Use    Smoking status: Never Smoker    Smokeless tobacco: Never Used   Substance and Sexual Activity    Alcohol use: Yes     Frequency: 4 or more times a week     Drinks per session: 3 or 4     Binge frequency: Weekly     Comment: 2-3 times per week has 6+ vodka shots    Drug use: Not Currently     Comment: 1 year of cocaine use 25 years ago, none now    Sexual activity: Yes   Lifestyle    Physical activity:     Days per week: Not on file     Minutes per session: Not on file    Stress: Not on file   Relationships  Social connections:     Talks on phone: Not on file     Gets together: Not on file     Attends [employer] service: Not on file     Active member of club or organization: Not on file     Attends meetings of clubs or organizations: Not on file     Relationship status: Not on file    Intimate partner violence:     Fear of current or ex partner: Not on file     Emotionally abused: Not on file     Physically abused: Not on file     Forced sexual activity: Not on file   Other Topics Concern    Not on file   Social History Narrative    Bereavement       Current Medications:   Current Outpatient Medications   Medication Sig Dispense Refill    Cholecalciferol (VITAMIN D3) 1000 units CAPS Take by mouth      clotrimazole-betamethasone (LOTRISONE) 1-0 05 % cream Apply topically 2 (two) times a day 30 g 0    hydrocortisone-acetic acid (VOSOL-HC) otic solution Administer 3 drops into both ears 4 (four) times a day 10 mL 3    levothyroxine 75 mcg tablet Take 1 tablet (75 mcg total) by mouth daily 90 tablet 0    metroNIDAZOLE (METROGEL) 0 75 % vaginal gel       montelukast (SINGULAIR) 10 mg tablet Take 1 tablet (10 mg total) by mouth daily at bedtime 30 tablet 2    mupirocin (BACTROBAN) 2 % cream Apply topically 2 (two) times a day for 7 days 15 g 0    risperiDONE (RISPERDAL) 2 mg tablet 3 mg       sucralfate (CARAFATE) 1 g/10 mL suspension Take 10 mL (1 g total) by mouth 4 (four) times a day (Patient not taking: Reported on 2/4/2020) 420 mL 0    VIIBRYD 20 MG tablet Take 20 mg by mouth daily  2     No current facility-administered medications for this visit  Allergies: Allergies   Allergen Reactions    Citalopram Hives     Reaction Date: 27Feb2012;     Lamotrigine Hives         Physical Exam:    Body surface area is 1 84 meters squared     Ht Readings from Last 3 Encounters:   03/04/20 5' 2" (1 575 m)   02/27/20 5' 2" (1 575 m)   02/04/20 5' 2" (1 575 m)       Wt Readings from Last 3 Encounters:   03/04/20 83 kg (183 lb)   02/27/20 83 6 kg (184 lb 3 2 oz)   02/04/20 83 kg (183 lb)        Temp Readings from Last 3 Encounters:   03/04/20 97 8 °F (36 6 °C) (Tympanic)   02/27/20 98 1 °F (36 7 °C)   02/04/20 97 6 °F (36 4 °C) (Oral)        BP Readings from Last 3 Encounters:   03/04/20 110/74   02/27/20 120/92   02/04/20 126/82         Pulse Readings from Last 3 Encounters:   03/04/20 95   02/27/20 90   02/04/20 83         Physical Exam    Physical Exam   Constitutional: She is oriented to person, place, and time  She appears well-developed and well-nourished  No distress  HENT:   Head: Normocephalic and atraumatic  Eyes: Conjunctivae are normal    Neck: Normal range of motion  Neck supple  No tracheal deviation present  Cardiovascular: Normal rate and regular rhythm  Exam reveals no gallop and no friction rub  No murmur heard  Pulmonary/Chest: Effort normal and breath sounds normal  No respiratory distress  She has no wheezes  She has no rales  She exhibits no tenderness  Abdominal: Soft  She exhibits no distension  There is no tenderness  Lymphadenopathy:     She has no cervical adenopathy  Neurological: She is alert and oriented to person, place, and time  Skin: Skin is warm and dry  She is not diaphoretic  No erythema  No pallor  Psychiatric: She has a normal mood and affect  Her behavior is normal  Judgment and thought content normal    Vitals reviewed            Emergency Contacts:    Harini Salinas, ,

## 2020-03-06 ENCOUNTER — AMBULATORY - HEALTHEAST (OUTPATIENT)
Dept: LAB | Facility: CLINIC | Age: 50
End: 2020-03-06

## 2020-03-06 DIAGNOSIS — Z13.220 SCREENING FOR LIPOID DISORDERS: ICD-10-CM

## 2020-03-06 DIAGNOSIS — Z13.1 SCREENING FOR DIABETES MELLITUS: ICD-10-CM

## 2020-03-06 LAB
CHOLEST SERPL-MCNC: 233 MG/DL
FASTING STATUS PATIENT QL REPORTED: YES
FASTING STATUS PATIENT QL REPORTED: YES
GLUCOSE BLD-MCNC: 77 MG/DL (ref 70–125)
HDLC SERPL-MCNC: 86 MG/DL
LDLC SERPL CALC-MCNC: 134 MG/DL
TRIGL SERPL-MCNC: 66 MG/DL

## 2020-03-13 ENCOUNTER — HOSPITAL ENCOUNTER (OUTPATIENT)
Dept: INFUSION CENTER | Facility: HOSPITAL | Age: 50
Discharge: HOME | End: 2020-03-13
Attending: INTERNAL MEDICINE
Payer: COMMERCIAL

## 2020-03-13 VITALS
RESPIRATION RATE: 16 BRPM | SYSTOLIC BLOOD PRESSURE: 165 MMHG | HEART RATE: 87 BPM | DIASTOLIC BLOOD PRESSURE: 87 MMHG | TEMPERATURE: 96.9 F

## 2020-03-13 DIAGNOSIS — E61.1 IRON DEFICIENCY: Primary | ICD-10-CM

## 2020-03-13 PROCEDURE — 96365 THER/PROPH/DIAG IV INF INIT: CPT

## 2020-03-13 PROCEDURE — 96366 THER/PROPH/DIAG IV INF ADDON: CPT

## 2020-03-13 RX ORDER — SODIUM CHLORIDE 9 MG/ML
20 INJECTION, SOLUTION INTRAVENOUS ONCE
Status: CANCELLED | OUTPATIENT
Start: 2020-03-20

## 2020-03-13 RX ORDER — SODIUM CHLORIDE 9 MG/ML
20 INJECTION, SOLUTION INTRAVENOUS ONCE
Status: COMPLETED | OUTPATIENT
Start: 2020-03-13 | End: 2020-03-13

## 2020-03-13 RX ADMIN — SODIUM CHLORIDE 20 ML/HR: 0.9 INJECTION, SOLUTION INTRAVENOUS at 09:04

## 2020-03-13 RX ADMIN — IRON SUCROSE 300 MG: 20 INJECTION, SOLUTION INTRAVENOUS at 09:04

## 2020-03-13 NOTE — PLAN OF CARE
Problem: Potential for Falls  Goal: Patient will remain free of falls  Description  INTERVENTIONS:  - Assess patient frequently for physical needs  -  Identify cognitive and physical deficits and behaviors that affect risk of falls    -  [place] fall precautions as indicated by assessment   - Educate patient/family on patient safety including physical limitations  - Instruct patient to call for assistance with activity based on assessment  - Modify environment to reduce risk of injury  - Consider OT/PT consult to assist with strengthening/mobility  Outcome: Progressing

## 2020-03-20 ENCOUNTER — HOSPITAL ENCOUNTER (OUTPATIENT)
Dept: INFUSION CENTER | Facility: HOSPITAL | Age: 50
Discharge: HOME | End: 2020-03-20
Attending: INTERNAL MEDICINE
Payer: COMMERCIAL

## 2020-03-20 ENCOUNTER — TELEPHONE (OUTPATIENT)
Dept: HEMATOLOGY ONCOLOGY | Facility: CLINIC | Age: 50
End: 2020-03-20

## 2020-03-20 VITALS
HEART RATE: 84 BPM | DIASTOLIC BLOOD PRESSURE: 76 MMHG | TEMPERATURE: 97.5 F | SYSTOLIC BLOOD PRESSURE: 126 MMHG | RESPIRATION RATE: 18 BRPM

## 2020-03-20 DIAGNOSIS — E61.1 IRON DEFICIENCY: Primary | ICD-10-CM

## 2020-03-20 PROCEDURE — 96365 THER/PROPH/DIAG IV INF INIT: CPT

## 2020-03-20 PROCEDURE — 96366 THER/PROPH/DIAG IV INF ADDON: CPT

## 2020-03-20 RX ORDER — SODIUM CHLORIDE 9 MG/ML
20 INJECTION, SOLUTION INTRAVENOUS ONCE
Status: CANCELLED | OUTPATIENT
Start: 2020-03-27

## 2020-03-20 RX ORDER — SODIUM CHLORIDE 9 MG/ML
20 INJECTION, SOLUTION INTRAVENOUS ONCE
Status: COMPLETED | OUTPATIENT
Start: 2020-03-20 | End: 2020-03-20

## 2020-03-20 RX ADMIN — IRON SUCROSE 300 MG: 20 INJECTION, SOLUTION INTRAVENOUS at 09:01

## 2020-03-20 RX ADMIN — SODIUM CHLORIDE 20 ML/HR: 0.9 INJECTION, SOLUTION INTRAVENOUS at 09:00

## 2020-03-20 NOTE — PLAN OF CARE
Problem: Potential for Falls  Goal: Patient will remain free of falls  Description  INTERVENTIONS:  - Assess patient frequently for physical needs  -  Identify cognitive and physical deficits and behaviors that affect risk of falls    -  [place] fall precautions as indicated by assessment   - Educate patient/family on patient safety including physical limitations  - Instruct patient to call for assistance with activity based on assessment  - Modify environment to reduce risk of injury  - Consider OT/PT consult to assist with strengthening/mobility  Outcome: Progressing

## 2020-03-20 NOTE — TELEPHONE ENCOUNTER
Telephone Triage-Symptom Call      Lilia Berumen  1970  [phone]    Caller:Self  Chief Complaint:Rash  Current Treatment patient:  Yes  Name of treatment: Venofer  Date of last treatment:3/13/20  Recent illness or Surgery: No      Description of symptoms per [facility] Guidelines review (charting by exception): Patient called to report that she noticed a red nonraised rash on bilateral upper thighs  Tingling and burning in bilateral ankles  Urine color is dark orange/brownish like ice tea  Denies burning with urination  Patient denies SOB or chestpain  Advised to call back if symptoms worsen  Patient verbalized that she would  Patient had Venofer infusion on 3/13/20  Patient advised to take OTC Benadryl for rash  Guidelines followed: Yes    Disposition: Home Care Instructions    NEXT STEPS REQUIRED: Please review with SHLOMO Metz  Call patient with additional recommendations    Patient's call back #  [phone] (H)    Patient verbalizes understanding and is in agreement with plan: YES    Verified that guidelines were completed and documented: YES

## 2020-03-23 ENCOUNTER — TELEPHONE (OUTPATIENT)
Dept: INTERNAL MEDICINE CLINIC | Facility: CLINIC | Age: 50
End: 2020-03-23

## 2020-03-23 NOTE — TELEPHONE ENCOUNTER
Patient said about 2-3 weeks ago her boyfriend stepped on her right big toe and it has been purple  It is her nail bed that is purple  She is concerned wanted to know what to do for her toe? Is this normal?     Also she said she has not been sleeping well, she said it's been about a year that she has not slept well  Wanted to know if you would call something in for her?     Please advise

## 2020-03-24 ENCOUNTER — TELEMEDICINE (OUTPATIENT)
Dept: INTERNAL MEDICINE CLINIC | Facility: CLINIC | Age: 50
End: 2020-03-24
Payer: COMMERCIAL

## 2020-03-24 DIAGNOSIS — J30.1 NON-SEASONAL ALLERGIC RHINITIS DUE TO POLLEN: ICD-10-CM

## 2020-03-24 DIAGNOSIS — S99.929A INJURY OF TOE, UNSPECIFIED LATERALITY, INITIAL ENCOUNTER: Primary | ICD-10-CM

## 2020-03-24 DIAGNOSIS — F51.01 PRIMARY INSOMNIA: ICD-10-CM

## 2020-03-24 DIAGNOSIS — E03.9 HYPOTHYROIDISM, UNSPECIFIED TYPE: ICD-10-CM

## 2020-03-24 DIAGNOSIS — Z13.6 SCREENING FOR CARDIOVASCULAR CONDITION: ICD-10-CM

## 2020-03-24 PROCEDURE — G2012 BRIEF CHECK IN BY MD/QHP: HCPCS | Performed by: INTERNAL MEDICINE

## 2020-03-24 RX ORDER — MONTELUKAST SODIUM 10 MG/1
10 TABLET ORAL
Qty: 30 TABLET | Refills: 2 | Status: SHIPPED | OUTPATIENT
Start: 2020-03-24 | End: 2020-06-01 | Stop reason: SDUPTHER

## 2020-03-24 RX ORDER — LEVOTHYROXINE SODIUM 0.07 MG/1
75 TABLET ORAL DAILY
Qty: 90 TABLET | Refills: 0 | Status: SHIPPED | OUTPATIENT
Start: 2020-03-24 | End: 2020-06-01 | Stop reason: SDUPTHER

## 2020-03-24 RX ORDER — RAMELTEON 8 MG/1
8 TABLET ORAL
Qty: 30 TABLET | Refills: 1 | Status: SHIPPED | OUTPATIENT
Start: 2020-03-24 | End: 2020-03-27

## 2020-03-24 NOTE — ASSESSMENT & PLAN NOTE
Clinically stable and doing well continue the current medical regiment will continue monitor    Patient does request refill of singular 10 mg once daily

## 2020-03-24 NOTE — ASSESSMENT & PLAN NOTE
Suspect a subungual hematoma she does not have pain she has now had the symptoms for 2 weeks she has as a full range of motion without pain I doubt a fracture but will check x-ray I would like the patient to use warm soaks if she develops any pain or problem she is notify me and then will consider a podiatry consult for nail removal   I have explained to patient likely she will lose her nail

## 2020-03-24 NOTE — PROGRESS NOTES
Virtual Regular Visit    Problem List Items Addressed This Visit        Endocrine    Hypothyroidism    Relevant Medications    levothyroxine 75 mcg tablet    Other Relevant Orders    TSH, 3rd generation       Respiratory    Non-seasonal allergic rhinitis due to pollen     Clinically stable and doing well continue the current medical regiment will continue monitor  Patient does request refill of singular 10 mg once daily         Relevant Medications    montelukast (SINGULAIR) 10 mg tablet       Other    Screening for cardiovascular condition     Will check a fasting lipid profile         Relevant Orders    Comprehensive metabolic panel    Lipid Panel with Direct LDL reflex    Toe injury - Primary     Suspect a subungual hematoma she does not have pain she has now had the symptoms for 2 weeks she has as a full range of motion without pain I doubt a fracture but will check x-ray I would like the patient to use warm soaks if she develops any pain or problem she is notify me and then will consider a podiatry consult for nail removal   I have explained to patient likely she will lose her nail  Relevant Orders    XR toe right great min 2 view    Primary insomnia     Rx for Rozerem 8 mg 1 p o  Q h s  P r n  insomnia no alcohol or driving after taking medication  Relevant Medications    ramelteon (ROZEREM) 8 mg tablet      RTO 2 months call if any problems         Reason for visit is toe injury, insomnia, allergies    Encounter provider Diann Ni DO    Provider located at [address] [address] [address]      Recent Visits  Date Type Provider Dept   03/23/20 Telephone Diann Ni [address] recent visits within past 7 days and meeting all other requirements     Future Appointments  No visits were found meeting these conditions     Showing future appointments within next 150 days and meeting all other requirements        After connecting through [employer], the patient was identified by name and date of birth  Ledadagoberto Rizo was informed that this is a telemedicine visit and that the visit is being conducted through telephone which may not be secure and therefore, might not be HIPAA-compliant  My office door was closed  No one else was in the room  She acknowledged consent and understanding of privacy and security of the video platform  The patient has agreed to participate and understands they can discontinue the visit at any time  Eros Chandler is a 51 y o  female right big toe purple , boy friend stepped on toe 2 -3 week no pain , able to move the toe; the purple is under the nail no nail pain  Patient also reports me insomnia for last year she wakes up every 1-2 hours she has only been using over-the-counter melatonin up to 10 mg without relief of her symptoms  No suicidal ideation  She is requesting a medication for insomnia     Depression  No si working with counsellor seeing psy Dr Aleman Ask     She reports insomnia  Wake up every hour only a couple hours of sleep for the last yr tried melatonin 10mg with relief    Past Medical History:   Diagnosis Date    Anxiety     Bipolar disorder ([facility] [address] )     Depression     Disease of thyroid gland     Gallbladder calculus     Infectious viral hepatitis 2009    Hep C, treated    Wears glasses        Past Surgical History:   Procedure Laterality Date    LIVER BIOPSY      Last assessed: 7/1/15    [place] LAP,CHOLECYSTECTOMY N/A 7/17/2019    Procedure: LAPAROSCOPIC CHOLECYSTECTOMY;  Surgeon: Lafe Gaucher, MD;  [facility];  Service: General    TUBAL LIGATION         Current Outpatient Medications   Medication Sig Dispense Refill    Cholecalciferol (VITAMIN D3) 1000 units CAPS Take by mouth      clindamycin (CLEOCIN) 2 % vaginal cream       clotrimazole-betamethasone (LOTRISONE) 1-0 05 % cream Apply topically 2 (two) times a day 30 g 0  hydrocortisone-acetic acid (VOSOL-HC) otic solution Administer 3 drops into both ears 4 (four) times a day 10 mL 3    levothyroxine 75 mcg tablet Take 1 tablet (75 mcg total) by mouth daily 90 tablet 0    metroNIDAZOLE (METROGEL) 0 75 % vaginal gel       montelukast (SINGULAIR) 10 mg tablet Take 1 tablet (10 mg total) by mouth daily at bedtime 30 tablet 2    mupirocin (BACTROBAN) 2 % cream Apply topically 2 (two) times a day for 7 days 15 g 0    nystatin (MYCOSTATIN) cream Apply 1 application topically 2 (two) times a day To affected area      ramelteon (ROZEREM) 8 mg tablet Take 1 tablet (8 mg total) by mouth daily at bedtime as needed for sleep 30 tablet 1    risperiDONE (RISPERDAL) 2 mg tablet 3 mg       sucralfate (CARAFATE) 1 g/10 mL suspension Take 10 mL (1 g total) by mouth 4 (four) times a day (Patient not taking: Reported on 2/4/2020) 420 mL 0    triamcinolone (KENALOG) 0 1 % cream Apply 1 application topically 2 (two) times a day To affected area      VIIBRYD 20 MG tablet Take 20 mg by mouth daily  2     No current facility-administered medications for this visit  Allergies   Allergen Reactions    Citalopram Hives     Reaction Date: 27Feb2012;     Lamotrigine Hives       Review of Systems   Skin:        No great toe pain full range of motion great toe there is a purple discoloration under the toenail right great toe   Psychiatric/Behavioral: Positive for sleep disturbance  Negative for suicidal ideas  Depression working with Psychiatry         I spent 25 minutes with the patient during this visit

## 2020-03-25 ENCOUNTER — TELEPHONE (OUTPATIENT)
Dept: INTERNAL MEDICINE CLINIC | Facility: CLINIC | Age: 50
End: 2020-03-25

## 2020-03-25 NOTE — TELEPHONE ENCOUNTER
The patient's insurance will not cover ramelteon  The patient would like you to prescribe something else  Please advise  Thank you  [facility]  [employer]      Please notify patient once the order has been sent to the [facility]

## 2020-03-26 ENCOUNTER — DOCUMENTATION (OUTPATIENT)
Dept: INTERNAL MEDICINE CLINIC | Facility: CLINIC | Age: 50
End: 2020-03-26

## 2020-03-26 DIAGNOSIS — F51.01 PRIMARY INSOMNIA: Primary | ICD-10-CM

## 2020-03-26 NOTE — PROGRESS NOTES
Lynnette, PharmD, Zeinab Kapoor    The following is per review of patient's pertinent medical/medication history:    Reason for documentation: Per PCP request, patient's insurance formulary reviewed for alternative sleep agent    Findings:   Previous Sleep Medications:  · trazodone d/c in 2011 but unable to determine why rx was stopped  · Mirtazapine d/c in 2015 but unable to determine why rx was stopped, documented patient was tolerating rx well  · Zolpidem d/c in 2018 but unable to determine why  · Melatonin documented with some relief with 10mg dose on 3/24    Psych history: anxiety, depression  Currently on vilazodone    Recommendations: would recommend retrial with mirtazapine as rx can assist with sleep and depression;  May also consider retrial with trazodone  Of note, vilazodone can cause insomnia or drowsiness; if patient takes vilazodone prior to sleep may consider admin rx in AM      Call placed to patient to discuss the above recommendations but unable to reach, VM left requesting a return phone call       PCP: no further action required at this time, will update encounter with new note if patient returns phone call    Demographics  Interaction Method: Phone  Type of Intervention: New    Topic(s) Addressed  Medication Management    Intervention(s) Made    Pharmacologic:      Medication Conversion - Non-Preferred to Preferred    Prevent or Manage Adverse Drug Reaction    Tool(s) Used  Not Applicable    Time Spent:   Time Spent in Direct Patient Care: 0-15 Minutes    Time Spent in Care Coordination: 16-30 Minutes    Recommendation(s) Accepted by the Patient/Caregiver: Not Applicable

## 2020-03-26 NOTE — PROGRESS NOTES
Patient returned phone call  Reported trazodone made her gain weight in the past and is not interested in retrial, unable to remember why she stopped mirtazapine but would be interested in retrial with rx  Takes vilazodone in AM      If PCP is in agreeance, will retrial mirtazapine 7 5mg HS for insomnia; will pend rx for signature/concurrence  Patient agrees to contact clinical pharmacist if she continues to have difficulty sleeping after starting mirtazapine

## 2020-03-27 ENCOUNTER — COMMUNICATION - HEALTHEAST (OUTPATIENT)
Dept: INTERNAL MEDICINE | Facility: CLINIC | Age: 50
End: 2020-03-27

## 2020-03-27 ENCOUNTER — HOSPITAL ENCOUNTER (OUTPATIENT)
Dept: INFUSION CENTER | Facility: HOSPITAL | Age: 50
Discharge: HOME | End: 2020-03-27
Attending: INTERNAL MEDICINE

## 2020-03-27 ENCOUNTER — TELEMEDICINE (OUTPATIENT)
Dept: INTERNAL MEDICINE CLINIC | Facility: CLINIC | Age: 50
End: 2020-03-27
Payer: COMMERCIAL

## 2020-03-27 DIAGNOSIS — F51.01 PRIMARY INSOMNIA: Primary | ICD-10-CM

## 2020-03-27 PROCEDURE — 99214 OFFICE O/P EST MOD 30 MIN: CPT | Performed by: INTERNAL MEDICINE

## 2020-03-27 NOTE — PROGRESS NOTES
Virtual Regular Visit    Problem List Items Addressed This Visit        Other    Primary insomnia - Primary     Today I did have a conversation with the patient regarding the recommendations of pharmacist Annette; the patient in the past did have intolerance to trazodone leading to weight gain on she does not want to be on this medication  Therefore mirtazapine was recommended for the patient because of the potential interaction with Viibryd, I did review the serotonin syndrome with the patient and she is concerned about this potential side effect therefore she will contact her psychiatrist on Monday to see if the [address] can be replaced the mirtazapine which will likely help both the depression and insomnia  Patient is to call me if she would like me to prescribe it or if her psychiatrist will be prescribing  RTO as scheduled call if any problems  Reason for visit is insomnia    Encounter provider Cristobal Reid DO    Provider located at [address] [address] [address]      Recent Visits  Date Type Provider Dept   03/25/20 Telephone Jainsteven Reid, [facility]   03/24/20 [address] [facility], [address] [place] [address]   03/23/20 Telephone Cristobal Reid, [address] recent visits within past 7 days and meeting all other requirements     Future Appointments  No visits were found meeting these conditions  Showing future appointments within next 150 days and meeting all other requirements        After connecting through Music[employer], the patient was identified by name and date of birth  Shola Sheppard was informed that this is a telemedicine visit and that the visit is being conducted through telephone which may not be secure and therefore, might not be HIPAA-compliant  My office door was closed  No one else was in the room    She acknowledged consent and understanding of privacy and security of the video platform  The patient has agreed to participate and understands they can discontinue the visit at any time  Subjective  Shola Sheppard is a 53 y o  female insomnia  She reports to me that the Razerem was not covered by her insurance therefore I had the pharmacist Annette review her case  After discussing this with the patient and reviewing the potential side effects including the serotonin syndrome and potential side effects the patient was concerned  Therefore she will contact Psychiatry on Monday and check with Psychiatry if she could replace the [address] with mirtazapine which would likely help both her insomnia and depression    Patient's main symptom is insomnia    Past Medical History:   Diagnosis Date    Anxiety     Bipolar disorder ([address] )     Depression     Disease of thyroid gland     Gallbladder calculus     Infectious viral hepatitis 2009    Hep C, treated    Wears glasses        Past Surgical History:   Procedure Laterality Date    LIVER BIOPSY      Last assessed: 7/1/15    [place] LAP,CHOLECYSTECTOMY N/A 7/17/2019    Procedure: LAPAROSCOPIC CHOLECYSTECTOMY;  Surgeon: Francisco Bruner MD;  [facility];  Service: General    TUBAL LIGATION         Current Outpatient Medications   Medication Sig Dispense Refill    Cholecalciferol (VITAMIN D3) 1000 units CAPS Take by mouth      clindamycin (CLEOCIN) 2 % vaginal cream       clotrimazole-betamethasone (LOTRISONE) 1-0 05 % cream Apply topically 2 (two) times a day 30 g 0    hydrocortisone-acetic acid (VOSOL-HC) otic solution Administer 3 drops into both ears 4 (four) times a day 10 mL 3    levothyroxine 75 mcg tablet Take 1 tablet (75 mcg total) by mouth daily 90 tablet 0    metroNIDAZOLE (METROGEL) 0 75 % vaginal gel       montelukast (SINGULAIR) 10 mg tablet Take 1 tablet (10 mg total) by mouth daily at bedtime 30 tablet 2    mupirocin (BACTROBAN) 2 % cream Apply topically 2 (two) times a day for 7 days 15 g 0    nystatin (MYCOSTATIN) cream Apply 1 application topically 2 (two) times a day To affected area      risperiDONE (RISPERDAL) 2 mg tablet 3 mg       sucralfate (CARAFATE) 1 g/10 mL suspension Take 10 mL (1 g total) by mouth 4 (four) times a day (Patient not taking: Reported on 2/4/2020) 420 mL 0    triamcinolone (KENALOG) 0 1 % cream Apply 1 application topically 2 (two) times a day To affected area      VIIBRYD 20 MG tablet Take 20 mg by mouth daily  2     No current facility-administered medications for this visit  Allergies   Allergen Reactions    Citalopram Hives     Reaction Date: 27Feb2012;     Lamotrigine Hives       Review of Systems      I spent 20 minutes with the patient during this visit

## 2020-03-27 NOTE — ASSESSMENT & PLAN NOTE
Today I did have a conversation with the patient regarding the recommendations of pharmacist Annette; the patient in the past did have intolerance to trazodone leading to weight gain on she does not want to be on this medication  Therefore mirtazapine was recommended for the patient because of the potential interaction with Viibryd, I did review the serotonin syndrome with the patient and she is concerned about this potential side effect therefore she will contact her psychiatrist on Monday to see if the [address] can be replaced the mirtazapine which will likely help both the depression and insomnia  Patient is to call me if she would like me to prescribe it or if her psychiatrist will be prescribing  RTO as scheduled call if any problems

## 2020-03-30 RX ORDER — MIRTAZAPINE 7.5 MG/1
7.5 TABLET, FILM COATED ORAL
Qty: 30 TABLET | Refills: 5 | OUTPATIENT
Start: 2020-03-30

## 2020-04-01 ENCOUNTER — HOSPITAL ENCOUNTER (OUTPATIENT)
Dept: INFUSION CENTER | Facility: HOSPITAL | Age: 50
End: 2020-04-01
Attending: INTERNAL MEDICINE

## 2020-04-03 ENCOUNTER — TRANSCRIBE ORDERS (OUTPATIENT)
Dept: RADIOLOGY | Facility: HOSPITAL | Age: 50
End: 2020-04-03

## 2020-04-03 ENCOUNTER — HOSPITAL ENCOUNTER (OUTPATIENT)
Dept: RADIOLOGY | Facility: HOSPITAL | Age: 50
Discharge: HOME | End: 2020-04-03
Payer: COMMERCIAL

## 2020-04-03 DIAGNOSIS — S99.929A INJURY OF TOE, UNSPECIFIED LATERALITY, INITIAL ENCOUNTER: ICD-10-CM

## 2020-04-03 PROCEDURE — 73660 X-RAY EXAM OF TOE(S): CPT

## 2020-04-09 RX ORDER — SODIUM CHLORIDE 9 MG/ML
20 INJECTION, SOLUTION INTRAVENOUS ONCE
Status: CANCELLED | OUTPATIENT
Start: 2020-04-10

## 2020-04-10 ENCOUNTER — HOSPITAL ENCOUNTER (OUTPATIENT)
Dept: INFUSION CENTER | Facility: HOSPITAL | Age: 50
Discharge: HOME | End: 2020-04-10
Attending: INTERNAL MEDICINE

## 2020-04-15 RX ORDER — SODIUM CHLORIDE 9 MG/ML
20 INJECTION, SOLUTION INTRAVENOUS ONCE
Status: CANCELLED | OUTPATIENT
Start: 2020-04-17

## 2020-05-26 RX ORDER — MIRTAZAPINE 15 MG/1
TABLET, FILM COATED ORAL
COMMUNITY
Start: 2020-03-31 | End: 2020-05-28

## 2020-05-26 RX ORDER — OXYBUTYNIN CHLORIDE 5 MG/1
5 TABLET, EXTENDED RELEASE ORAL DAILY
COMMUNITY
Start: 2020-04-21 | End: 2021-03-04 | Stop reason: ALTCHOICE

## 2020-05-26 RX ORDER — CLONAZEPAM 0.5 MG/1
TABLET ORAL
COMMUNITY
Start: 2020-05-20 | End: 2021-01-27

## 2020-05-26 RX ORDER — TRIHEXYPHENIDYL HYDROCHLORIDE 5 MG/1
10 TABLET ORAL
COMMUNITY
Start: 2020-05-20 | End: 2021-01-27

## 2020-05-28 ENCOUNTER — TELEMEDICINE (OUTPATIENT)
Dept: INTERNAL MEDICINE CLINIC | Facility: CLINIC | Age: 50
End: 2020-05-28
Payer: COMMERCIAL

## 2020-05-28 DIAGNOSIS — F32.A DEPRESSION, UNSPECIFIED DEPRESSION TYPE: ICD-10-CM

## 2020-05-28 DIAGNOSIS — R53.83 OTHER FATIGUE: ICD-10-CM

## 2020-05-28 DIAGNOSIS — F51.01 PRIMARY INSOMNIA: Primary | ICD-10-CM

## 2020-05-28 PROCEDURE — G2012 BRIEF CHECK IN BY MD/QHP: HCPCS | Performed by: INTERNAL MEDICINE

## 2020-05-28 RX ORDER — RISPERIDONE 4 MG/1
4 TABLET, FILM COATED ORAL EVERY EVENING
COMMUNITY
Start: 2020-04-22 | End: 2020-05-28 | Stop reason: ALTCHOICE

## 2020-05-28 RX ORDER — RISPERIDONE 3 MG/1
3 TABLET, FILM COATED ORAL EVERY EVENING
COMMUNITY
Start: 2020-04-15 | End: 2021-01-27

## 2020-06-01 ENCOUNTER — TELEPHONE (OUTPATIENT)
Dept: INTERNAL MEDICINE CLINIC | Facility: CLINIC | Age: 50
End: 2020-06-01

## 2020-06-01 DIAGNOSIS — E03.9 HYPOTHYROIDISM, UNSPECIFIED TYPE: ICD-10-CM

## 2020-06-01 DIAGNOSIS — J30.1 NON-SEASONAL ALLERGIC RHINITIS DUE TO POLLEN: ICD-10-CM

## 2020-06-01 RX ORDER — MONTELUKAST SODIUM 10 MG/1
10 TABLET ORAL
Qty: 30 TABLET | Refills: 2 | Status: SHIPPED | OUTPATIENT
Start: 2020-06-01 | End: 2020-09-14

## 2020-06-01 RX ORDER — LEVOTHYROXINE SODIUM 0.07 MG/1
75 TABLET ORAL DAILY
Qty: 90 TABLET | Refills: 1 | Status: SHIPPED | OUTPATIENT
Start: 2020-06-01 | End: 2020-12-21

## 2020-06-04 DIAGNOSIS — H60.8X9: ICD-10-CM

## 2020-06-04 RX ORDER — HYDROCORTISONE AND ACETIC ACID 20.75; 10.375 MG/ML; MG/ML
3 SOLUTION AURICULAR (OTIC) 4 TIMES DAILY
Qty: 10 ML | Refills: 3 | Status: SHIPPED | OUTPATIENT
Start: 2020-06-04 | End: 2021-01-27 | Stop reason: SDUPTHER

## 2020-06-12 ENCOUNTER — APPOINTMENT (OUTPATIENT)
Dept: LAB | Facility: HOSPITAL | Age: 50
End: 2020-06-12
Payer: COMMERCIAL

## 2020-06-12 DIAGNOSIS — E87.1 SODIUM (NA) DEFICIENCY: Primary | ICD-10-CM

## 2020-06-12 DIAGNOSIS — E83.19 IRON EXCESS: ICD-10-CM

## 2020-06-12 DIAGNOSIS — Z13.6 SCREENING FOR CARDIOVASCULAR CONDITION: Primary | ICD-10-CM

## 2020-06-12 DIAGNOSIS — R53.83 OTHER FATIGUE: ICD-10-CM

## 2020-06-12 DIAGNOSIS — E03.9 HYPOTHYROIDISM, UNSPECIFIED TYPE: ICD-10-CM

## 2020-06-12 LAB
25(OH)D3 SERPL-MCNC: 41.1 NG/ML (ref 30–100)
ALBUMIN SERPL BCP-MCNC: 3.9 G/DL (ref 3.5–5)
ALP SERPL-CCNC: 141 U/L (ref 46–116)
ALT SERPL W P-5'-P-CCNC: 36 U/L (ref 12–78)
ANION GAP SERPL CALCULATED.3IONS-SCNC: 5 MMOL/L (ref 4–13)
AST SERPL W P-5'-P-CCNC: 20 U/L (ref 5–45)
BASOPHILS # BLD AUTO: 0.03 THOUSANDS/ΜL (ref 0–0.1)
BASOPHILS NFR BLD AUTO: 0 % (ref 0–1)
BILIRUB SERPL-MCNC: 0.6 MG/DL (ref 0.2–1)
BUN SERPL-MCNC: 14 MG/DL (ref 5–25)
CALCIUM SERPL-MCNC: 9.5 MG/DL (ref 8.3–10.1)
CHLORIDE SERPL-SCNC: 103 MMOL/L (ref 100–108)
CHOLEST SERPL-MCNC: 221 MG/DL (ref 50–200)
CO2 SERPL-SCNC: 27 MMOL/L (ref 21–32)
CREAT SERPL-MCNC: 0.75 MG/DL (ref 0.6–1.3)
EOSINOPHIL # BLD AUTO: 0.11 THOUSAND/ΜL (ref 0–0.61)
EOSINOPHIL NFR BLD AUTO: 1 % (ref 0–6)
ERYTHROCYTE [DISTWIDTH] IN BLOOD BY AUTOMATED COUNT: 12.8 % (ref 11.6–15.1)
FERRITIN SERPL-MCNC: 110 NG/ML (ref 8–388)
GFR SERPL CREATININE-BSD FRML MDRD: 94 ML/MIN/1.73SQ M
GLUCOSE P FAST SERPL-MCNC: 79 MG/DL (ref 65–99)
HCT VFR BLD AUTO: 43.4 % (ref 34.8–46.1)
HDLC SERPL-MCNC: 60 MG/DL
HGB BLD-MCNC: 14 G/DL (ref 11.5–15.4)
IMM GRANULOCYTES # BLD AUTO: 0.03 THOUSAND/UL (ref 0–0.2)
IMM GRANULOCYTES NFR BLD AUTO: 0 % (ref 0–2)
IRON SERPL-MCNC: 238 UG/DL (ref 50–170)
LDLC SERPL CALC-MCNC: 149 MG/DL (ref 0–100)
LYMPHOCYTES # BLD AUTO: 2.23 THOUSANDS/ΜL (ref 0.6–4.47)
LYMPHOCYTES NFR BLD AUTO: 28 % (ref 14–44)
MCH RBC QN AUTO: 29.4 PG (ref 26.8–34.3)
MCHC RBC AUTO-ENTMCNC: 32.3 G/DL (ref 31.4–37.4)
MCV RBC AUTO: 91 FL (ref 82–98)
MONOCYTES # BLD AUTO: 0.48 THOUSAND/ΜL (ref 0.17–1.22)
MONOCYTES NFR BLD AUTO: 6 % (ref 4–12)
NEUTROPHILS # BLD AUTO: 5.13 THOUSANDS/ΜL (ref 1.85–7.62)
NEUTS SEG NFR BLD AUTO: 65 % (ref 43–75)
NRBC BLD AUTO-RTO: 0 /100 WBCS
PLATELET # BLD AUTO: 263 THOUSANDS/UL (ref 149–390)
PMV BLD AUTO: 9.1 FL (ref 8.9–12.7)
POTASSIUM SERPL-SCNC: 3.9 MMOL/L (ref 3.5–5.3)
PROT SERPL-MCNC: 8 G/DL (ref 6.4–8.2)
RBC # BLD AUTO: 4.77 MILLION/UL (ref 3.81–5.12)
SODIUM SERPL-SCNC: 135 MMOL/L (ref 136–145)
TRIGL SERPL-MCNC: 62 MG/DL
TSH SERPL DL<=0.05 MIU/L-ACNC: 3.27 UIU/ML (ref 0.36–3.74)
VIT B12 SERPL-MCNC: 447 PG/ML (ref 100–900)
WBC # BLD AUTO: 8.01 THOUSAND/UL (ref 4.31–10.16)

## 2020-06-12 PROCEDURE — 80053 COMPREHEN METABOLIC PANEL: CPT

## 2020-06-12 PROCEDURE — 82306 VITAMIN D 25 HYDROXY: CPT

## 2020-06-12 PROCEDURE — 85025 COMPLETE CBC W/AUTO DIFF WBC: CPT

## 2020-06-12 PROCEDURE — 36415 COLL VENOUS BLD VENIPUNCTURE: CPT

## 2020-06-12 PROCEDURE — 80061 LIPID PANEL: CPT

## 2020-06-12 PROCEDURE — 84443 ASSAY THYROID STIM HORMONE: CPT

## 2020-06-12 PROCEDURE — 82728 ASSAY OF FERRITIN: CPT

## 2020-06-12 PROCEDURE — 82607 VITAMIN B-12: CPT

## 2020-06-12 PROCEDURE — 83540 ASSAY OF IRON: CPT

## 2020-07-22 ENCOUNTER — OFFICE VISIT - HEALTHEAST (OUTPATIENT)
Dept: INTERNAL MEDICINE | Facility: CLINIC | Age: 50
End: 2020-07-22

## 2020-07-22 DIAGNOSIS — Z12.11 SCREENING FOR COLON CANCER: ICD-10-CM

## 2020-07-22 DIAGNOSIS — Z00.00 ENCOUNTER FOR GENERAL ADULT MEDICAL EXAMINATION WITHOUT ABNORMAL FINDINGS: ICD-10-CM

## 2020-07-22 DIAGNOSIS — N93.9 ABNORMAL UTERINE BLEEDING: ICD-10-CM

## 2020-07-22 DIAGNOSIS — B00.9 HERPETIC LESION: ICD-10-CM

## 2020-07-22 RX ORDER — VALACYCLOVIR HYDROCHLORIDE 1 G/1
TABLET, FILM COATED ORAL
Qty: 6 TABLET | Refills: 2 | Status: SHIPPED | OUTPATIENT
Start: 2020-07-22 | End: 2021-12-17

## 2020-07-22 ASSESSMENT — MIFFLIN-ST. JEOR: SCORE: 1283.13

## 2020-07-23 ENCOUNTER — TELEPHONE (OUTPATIENT)
Dept: HEMATOLOGY ONCOLOGY | Facility: CLINIC | Age: 50
End: 2020-07-23

## 2020-07-23 ENCOUNTER — RECORDS - HEALTHEAST (OUTPATIENT)
Dept: ADMINISTRATIVE | Facility: OTHER | Age: 50
End: 2020-07-23

## 2020-07-23 NOTE — TELEPHONE ENCOUNTER
Lm on vm for patient advising appointment time has been changed from 8am to 1:30pm, due to Quinton Sharp not being in the office

## 2020-08-03 ENCOUNTER — TELEPHONE (OUTPATIENT)
Dept: PSYCHIATRY | Facility: CLINIC | Age: 50
End: 2020-08-03

## 2020-08-06 ENCOUNTER — HOSPITAL ENCOUNTER (OUTPATIENT)
Dept: ULTRASOUND IMAGING | Facility: HOSPITAL | Age: 50
Discharge: HOME | End: 2020-08-06
Attending: PEDIATRICS

## 2020-08-06 DIAGNOSIS — N93.9 ABNORMAL UTERINE BLEEDING: ICD-10-CM

## 2020-08-07 ENCOUNTER — COMMUNICATION - HEALTHEAST (OUTPATIENT)
Dept: INTERNAL MEDICINE | Facility: CLINIC | Age: 50
End: 2020-08-07

## 2020-08-14 ENCOUNTER — TELEPHONE (OUTPATIENT)
Dept: PSYCHIATRY | Facility: CLINIC | Age: 50
End: 2020-08-14

## 2020-08-19 ENCOUNTER — TELEPHONE (OUTPATIENT)
Dept: INTERNAL MEDICINE CLINIC | Facility: CLINIC | Age: 50
End: 2020-08-19

## 2020-08-19 NOTE — TELEPHONE ENCOUNTER
Pt needs a referral for obgyn  She uses [facility] on [address] in [place]  Phone number [phone]  And also an order for her routing screening mammo done at [address] [place] [address]

## 2020-08-24 ENCOUNTER — TRANSCRIBE ORDERS (OUTPATIENT)
Dept: ADMINISTRATIVE | Facility: HOSPITAL | Age: 50
End: 2020-08-24

## 2020-08-24 DIAGNOSIS — Z12.31 ENCOUNTER FOR SCREENING MAMMOGRAM FOR MALIGNANT NEOPLASM OF BREAST: Primary | ICD-10-CM

## 2020-08-24 DIAGNOSIS — Z12.4 SCREENING FOR MALIGNANT NEOPLASM OF THE CERVIX: Primary | ICD-10-CM

## 2020-08-24 NOTE — TELEPHONE ENCOUNTER
Orders in chart and faxed to [address] [place] [address] Ne at [phone]  LM on VM for patient

## 2020-09-03 ENCOUNTER — TELEPHONE (OUTPATIENT)
Dept: HEMATOLOGY ONCOLOGY | Facility: CLINIC | Age: 50
End: 2020-09-03

## 2020-09-12 ENCOUNTER — HOSPITAL ENCOUNTER (EMERGENCY)
Facility: HOSPITAL | Age: 50
Discharge: HOME | End: 2020-09-12
Attending: EMERGENCY MEDICINE | Admitting: EMERGENCY MEDICINE
Payer: COMMERCIAL

## 2020-09-12 VITALS
DIASTOLIC BLOOD PRESSURE: 80 MMHG | BODY MASS INDEX: 36.58 KG/M2 | HEART RATE: 96 BPM | WEIGHT: 200 LBS | SYSTOLIC BLOOD PRESSURE: 148 MMHG | RESPIRATION RATE: 16 BRPM | OXYGEN SATURATION: 99 % | TEMPERATURE: 97.5 F

## 2020-09-12 DIAGNOSIS — N89.8 VAGINAL ITCHING: ICD-10-CM

## 2020-09-12 DIAGNOSIS — N39.0 UTI (URINARY TRACT INFECTION): Primary | ICD-10-CM

## 2020-09-12 LAB
ANION GAP SERPL CALCULATED.3IONS-SCNC: 6 MMOL/L (ref 4–13)
BACTERIA UR QL AUTO: ABNORMAL /HPF
BASOPHILS # BLD AUTO: 0.02 THOUSANDS/ΜL (ref 0–0.1)
BASOPHILS NFR BLD AUTO: 0 % (ref 0–1)
BILIRUB UR QL STRIP: NEGATIVE
BUN SERPL-MCNC: 14 MG/DL (ref 5–25)
CALCIUM SERPL-MCNC: 8.9 MG/DL (ref 8.3–10.1)
CHLORIDE SERPL-SCNC: 106 MMOL/L (ref 100–108)
CLARITY UR: CLEAR
CO2 SERPL-SCNC: 25 MMOL/L (ref 21–32)
COLOR UR: YELLOW
COLOR, POC: NORMAL
CREAT SERPL-MCNC: 0.83 MG/DL (ref 0.6–1.3)
EOSINOPHIL # BLD AUTO: 0.12 THOUSAND/ΜL (ref 0–0.61)
EOSINOPHIL NFR BLD AUTO: 1 % (ref 0–6)
ERYTHROCYTE [DISTWIDTH] IN BLOOD BY AUTOMATED COUNT: 12.2 % (ref 11.6–15.1)
EXT PREG TEST URINE: NEGATIVE
EXT. CONTROL ED NAV: NORMAL
GFR SERPL CREATININE-BSD FRML MDRD: 83 ML/MIN/1.73SQ M
GLUCOSE SERPL-MCNC: 93 MG/DL (ref 65–140)
GLUCOSE UR STRIP-MCNC: NEGATIVE MG/DL
HCT VFR BLD AUTO: 45.6 % (ref 34.8–46.1)
HGB BLD-MCNC: 14.4 G/DL (ref 11.5–15.4)
HGB UR QL STRIP.AUTO: ABNORMAL
HYALINE CASTS #/AREA URNS LPF: ABNORMAL /LPF
IMM GRANULOCYTES # BLD AUTO: 0.02 THOUSAND/UL (ref 0–0.2)
IMM GRANULOCYTES NFR BLD AUTO: 0 % (ref 0–2)
KETONES UR STRIP-MCNC: NEGATIVE MG/DL
LEUKOCYTE ESTERASE UR QL STRIP: ABNORMAL
LYMPHOCYTES # BLD AUTO: 1.95 THOUSANDS/ΜL (ref 0.6–4.47)
LYMPHOCYTES NFR BLD AUTO: 24 % (ref 14–44)
MCH RBC QN AUTO: 29.9 PG (ref 26.8–34.3)
MCHC RBC AUTO-ENTMCNC: 31.6 G/DL (ref 31.4–37.4)
MCV RBC AUTO: 95 FL (ref 82–98)
MONOCYTES # BLD AUTO: 0.7 THOUSAND/ΜL (ref 0.17–1.22)
MONOCYTES NFR BLD AUTO: 9 % (ref 4–12)
NEUTROPHILS # BLD AUTO: 5.47 THOUSANDS/ΜL (ref 1.85–7.62)
NEUTS SEG NFR BLD AUTO: 66 % (ref 43–75)
NITRITE UR QL STRIP: NEGATIVE
NON-SQ EPI CELLS URNS QL MICRO: ABNORMAL /HPF
NRBC BLD AUTO-RTO: 0 /100 WBCS
PH UR STRIP.AUTO: 6.5 [PH] (ref 4.5–8)
PLATELET # BLD AUTO: 219 THOUSANDS/UL (ref 149–390)
PMV BLD AUTO: 9.6 FL (ref 8.9–12.7)
POTASSIUM SERPL-SCNC: 5.1 MMOL/L (ref 3.5–5.3)
PROT UR STRIP-MCNC: NEGATIVE MG/DL
RBC # BLD AUTO: 4.81 MILLION/UL (ref 3.81–5.12)
RBC #/AREA URNS AUTO: ABNORMAL /HPF
SODIUM SERPL-SCNC: 137 MMOL/L (ref 136–145)
SP GR UR STRIP.AUTO: 1.01 (ref 1–1.03)
UROBILINOGEN UR QL STRIP.AUTO: 0.2 E.U./DL
WBC # BLD AUTO: 8.28 THOUSAND/UL (ref 4.31–10.16)
WBC #/AREA URNS AUTO: ABNORMAL /HPF

## 2020-09-12 PROCEDURE — 99284 EMERGENCY DEPT VISIT MOD MDM: CPT | Performed by: EMERGENCY MEDICINE

## 2020-09-12 PROCEDURE — 87491 CHLMYD TRACH DNA AMP PROBE: CPT | Performed by: EMERGENCY MEDICINE

## 2020-09-12 PROCEDURE — 85025 COMPLETE CBC W/AUTO DIFF WBC: CPT | Performed by: EMERGENCY MEDICINE

## 2020-09-12 PROCEDURE — 87077 CULTURE AEROBIC IDENTIFY: CPT

## 2020-09-12 PROCEDURE — 36415 COLL VENOUS BLD VENIPUNCTURE: CPT | Performed by: EMERGENCY MEDICINE

## 2020-09-12 PROCEDURE — 81025 URINE PREGNANCY TEST: CPT | Performed by: EMERGENCY MEDICINE

## 2020-09-12 PROCEDURE — 99283 EMERGENCY DEPT VISIT LOW MDM: CPT

## 2020-09-12 PROCEDURE — 51798 US URINE CAPACITY MEASURE: CPT

## 2020-09-12 PROCEDURE — 87186 SC STD MICRODIL/AGAR DIL: CPT

## 2020-09-12 PROCEDURE — 87086 URINE CULTURE/COLONY COUNT: CPT

## 2020-09-12 PROCEDURE — 81001 URINALYSIS AUTO W/SCOPE: CPT

## 2020-09-12 PROCEDURE — 87591 N.GONORRHOEAE DNA AMP PROB: CPT | Performed by: EMERGENCY MEDICINE

## 2020-09-12 PROCEDURE — 80048 BASIC METABOLIC PNL TOTAL CA: CPT | Performed by: EMERGENCY MEDICINE

## 2020-09-12 RX ORDER — CEPHALEXIN 500 MG/1
500 CAPSULE ORAL 2 TIMES DAILY
Qty: 9 CAPSULE | Refills: 0 | Status: SHIPPED | OUTPATIENT
Start: 2020-09-12 | End: 2020-09-17

## 2020-09-12 RX ORDER — CEPHALEXIN 250 MG/1
500 CAPSULE ORAL ONCE
Status: COMPLETED | OUTPATIENT
Start: 2020-09-12 | End: 2020-09-12

## 2020-09-12 RX ORDER — FLUCONAZOLE 150 MG/1
150 TABLET ORAL ONCE
Status: COMPLETED | OUTPATIENT
Start: 2020-09-12 | End: 2020-09-12

## 2020-09-12 RX ADMIN — CEPHALEXIN 500 MG: 250 CAPSULE ORAL at 09:10

## 2020-09-12 RX ADMIN — FLUCONAZOLE 150 MG: 150 TABLET ORAL at 09:09

## 2020-09-12 NOTE — ED ATTENDING ATTESTATION
9/12/2020  IRussel MD, saw and evaluated the patient  I have discussed the patient with the resident/non-physician practitioner and agree with the resident's/non-physician practitioner's findings, Plan of Care, and MDM as documented in the resident's/non-physician practitioner's note, except where noted  All available labs and Radiology studies were reviewed  I was present for key portions of any procedure(s) performed by the resident/non-physician practitioner and I was immediately available to provide assistance  At this point I agree with the current assessment done in the Emergency Department  I have conducted an independent evaluation of this patient a history and physical is as follows:    67-year-old woman presenting with several weeks of increased urinary frequency and urgency, dysuria  Has had occasional left flank pain but none at present  Also notes several months of vaginal itching, she visited her gynecologist last month and was put on something but she cannot recall which she was diagnosed with or what medication she was on  No fever chills  No nausea or vomiting  Patient does note some recent mild swelling in her legs but without pain  Patient awake and alert in no acute distress  Head atraumatic normocephalic  Oropharynx clear  Neck is supple  Heart regular rate rhythm, no murmurs rubs or gallops  Lungs clear to auscultation bilaterally  Abdomen soft, nontender, nondistended  No CVA tenderness  There is trace bilateral lower extremity edema which is nonpitting  No erythema or tenderness  Urinalysis consistent with possible UTI and given symptoms will start the patient on treatment  Will also give single dose of Diflucan  STI testing sent as well      ED Course         Critical Care Time  Procedures

## 2020-09-12 NOTE — DISCHARGE INSTRUCTIONS
Follow-up with PCP for further care  Return to the ED with significant worsening of symptoms or development of a fever  Take your new medication as prescribed and to completion

## 2020-09-12 NOTE — ED PROVIDER NOTES
History  Chief Complaint   Patient presents with    Urinary Frequency     Pt c/o urinary frequency, urgency, and vaginal itching for about 3 days  Denies vaginal discharge  Pt is a 54 yo F who presents for increased urinary frequency and difficulty voiding  Patient states that for the past 2-3 days she has felt like she has to go to the bathroom approximately every 20 minutes and occasionally has difficulty getting any urine out at these times  Patient states that she has not noticed any blood in her urine and  Patient states that she has also had some suprapubic discomfort that she believes is associated with the feeling of having to urinate  Patient denies any fevers or chills  Patient does say for the past month she has had some vaginal itching  Patient saw her gynecologist and was given a medication but she does not remember what it was, without improvement  Patient denies any vaginal discharge or current concerns for STIs  Patient also states that she had a UTI approximately 3 months ago for which she medication and it resolved  Patient does not know what medication she took at this time  Prior to this recent UTI, patient does not have history of recurrent UTIs  Patient denies any history of kidney stones  Patient has had a tubal ligation as well as a cholecystectomy without other abdominal surgeries  Patient denies any recent medication changes  Patient is a nonsmoker, occasional drinker, non recreational drug user  Prior to Admission Medications   Prescriptions Last Dose Informant Patient Reported? Taking?    Cholecalciferol (VITAMIN D3) 1000 units CAPS   Yes No   Sig: Take by mouth   VIIBRYD 20 MG tablet   Yes No   Sig: Take 20 mg by mouth daily   clindamycin (CLEOCIN) 2 % vaginal cream   Yes No   clonazePAM (KlonoPIN) 0 5 mg tablet   Yes No   Sig: take one nightly   clotrimazole-betamethasone (LOTRISONE) 1-0 05 % cream   No No   Sig: Apply topically 2 (two) times a day hydrocortisone-acetic acid (VOSOL-HC) otic solution   No No   Sig: Administer 3 drops into both ears 4 (four) times a day   levothyroxine 75 mcg tablet   No No   Sig: Take 1 tablet (75 mcg total) by mouth daily   metroNIDAZOLE (METROGEL) 0 75 % vaginal gel   Yes No   montelukast (SINGULAIR) 10 mg tablet   No No   Sig: Take 1 tablet (10 mg total) by mouth daily at bedtime   nystatin (MYCOSTATIN) cream   Yes No   Sig: Apply 1 application topically 2 (two) times a day To affected area   oxybutynin (DITROPAN-XL) 5 mg 24 hr tablet   Yes No   Sig: Take 5 mg by mouth daily   risperiDONE (RisperDAL) 3 mg tablet   Yes No   Sig: Take 3 mg by mouth every evening   sucralfate (CARAFATE) 1 g/10 mL suspension   No No   Sig: Take 10 mL (1 g total) by mouth 4 (four) times a day   Patient not taking: Reported on 2/4/2020   triamcinolone (KENALOG) 0 1 % cream   Yes No   Sig: Apply 1 application topically 2 (two) times a day To affected area   trihexyphenidyl (ARTANE) 5 mg tablet   Yes No   Sig: Take 10 mg by mouth      Facility-Administered Medications: None       Past Medical History:   Diagnosis Date    Anxiety     Bipolar disorder ([facility] [address] )     Depression     Disease of thyroid gland     Gallbladder calculus     Infectious viral hepatitis 2009    Hep C, treated    Wears glasses        Past Surgical History:   Procedure Laterality Date    LIVER BIOPSY      Last assessed: 7/1/15    MT LAP,CHOLECYSTECTOMY N/A 7/17/2019    Procedure: LAPAROSCOPIC CHOLECYSTECTOMY;  Surgeon: John Paul Parks MD;  Location: [facility];  Service: General    TUBAL LIGATION         Family History   Problem Relation Age of Onset    Breast cancer Mother     Alcohol abuse Father         in recovery    Bipolar disorder Father         current episode depressed, current episode severity unspecified    COPD Father         w/acute bronchitis    Depression Father     Depression Sister     No Known Problems Daughter     Breast cancer Maternal Grandmother  No Known Problems Sister     No Known Problems Maternal Aunt     No Known Problems Paternal Aunt     No Known Problems Cousin      I have reviewed and agree with the history as documented  E-Cigarette/Vaping     E-Cigarette/Vaping Substances     Social History     Tobacco Use    Smoking status: Never Smoker    Smokeless tobacco: Never Used   Substance Use Topics    Alcohol use: Yes     Frequency: 4 or more times a week     Drinks per session: 3 or 4     Binge frequency: Weekly     Comment: 2-3 times per week has 6+ vodka shots    Drug use: Not Currently     Comment: 1 year of cocaine use 25 years ago, none now        Review of Systems   Constitutional: Negative for activity change, appetite change, chills, diaphoresis and fever  HENT: Negative for congestion, ear discharge, ear pain, sinus pressure, sinus pain and sore throat  Eyes: Negative for pain and redness  Respiratory: Negative for cough, chest tightness, shortness of breath and wheezing  Cardiovascular: Positive for leg swelling (bilateral)  Negative for chest pain and palpitations  Gastrointestinal: Positive for abdominal pain (lower; central)  Negative for abdominal distention, constipation, diarrhea, nausea and vomiting  Genitourinary: Positive for difficulty urinating, frequency and urgency  Negative for dysuria, flank pain, hematuria, vaginal bleeding, vaginal discharge and vaginal pain         + vaginal itching   Musculoskeletal: Negative for arthralgias, back pain, gait problem, neck pain and neck stiffness  Skin: Negative for color change, pallor, rash and wound  Neurological: Negative for tremors, syncope, weakness and headaches  Psychiatric/Behavioral: Negative for agitation, behavioral problems and confusion  The patient is not nervous/anxious          Physical Exam  ED Triage Vitals [09/12/20 0655]   Temperature Pulse Respirations Blood Pressure SpO2   97 5 °F (36 4 °C) (!) 114 16 154/88 97 %      Temp src Heart Rate Source Patient Position - Orthostatic VS BP Location FiO2 (%)   -- -- -- -- --      Pain Score       --             Orthostatic Vital Signs  Vitals:    09/12/20 0655 09/12/20 0912   BP: 154/88 148/80   Pulse: (!) 114 96       Physical Exam  Vitals signs reviewed  Exam conducted with a chaperone present  Constitutional:       General: She is not in acute distress  Appearance: Normal appearance  She is well-developed  She is not ill-appearing, toxic-appearing or diaphoretic  HENT:      Head: Normocephalic and atraumatic  Right Ear: External ear normal       Left Ear: External ear normal       Nose: Nose normal    Eyes:      Extraocular Movements: Extraocular movements intact  Conjunctiva/sclera: Conjunctivae normal       Pupils: Pupils are equal, round, and reactive to light  Neck:      Musculoskeletal: Normal range of motion and neck supple  Cardiovascular:      Rate and Rhythm: Regular rhythm  Tachycardia present  Pulses: Normal pulses  Heart sounds: Normal heart sounds  No murmur  Pulmonary:      Effort: Pulmonary effort is normal  No respiratory distress  Breath sounds: Normal breath sounds  No wheezing  Chest:      Chest wall: No tenderness  Abdominal:      General: Bowel sounds are normal  There is no distension  Palpations: Abdomen is soft  There is no mass  Tenderness: There is abdominal tenderness (minimal) in the right lower quadrant  There is no right CVA tenderness or left CVA tenderness  Genitourinary:     General: Normal vulva  Exam position: Supine  Vagina: Vaginal discharge (minimal) present  Cervix: No cervical motion tenderness, lesion, erythema or cervical bleeding  Musculoskeletal: Normal range of motion  Right lower leg: Edema (non-pitting; symmetrical) present  Left lower leg: Edema (non-pitting; symmetrical) present  Skin:     General: Skin is warm and dry        Capillary Refill: Capillary refill takes less than 2 seconds  Coloration: Skin is not pale  Findings: No erythema or rash  Neurological:      Mental Status: She is alert and oriented to person, place, and time  Psychiatric:         Mood and Affect: Mood normal          Behavior: Behavior normal          Thought Content: Thought content normal          Judgment: Judgment normal          ED Medications  Medications   fluconazole (DIFLUCAN) tablet 150 mg (150 mg Oral Given 9/12/20 0909)   cephalexin (KEFLEX) capsule 500 mg (500 mg Oral Given 9/12/20 0910)       Diagnostic Studies  Results Reviewed     Procedure Component Value Units Date/Time    Chlamydia/GC amplified DNA by PCR [[id number]] Collected:  09/12/20 0910    Lab Status:   In process Specimen:  Cervix Updated:  09/12/20 2472    Basic metabolic panel [[id number]] Collected:  09/12/20 0812    Lab Status:  Final result Specimen:  Blood from Arm, Right Updated:  09/12/20 0837     Sodium 137 mmol/L      Potassium 5 1 mmol/L      Chloride 106 mmol/L      CO2 25 mmol/L      ANION GAP 6 mmol/L      BUN 14 mg/dL      Creatinine 0 83 mg/dL      Glucose 93 mg/dL      Calcium 8 9 mg/dL      eGFR 83 ml/min/1 73sq m     Narrative:       [place] guidelines for Chronic Kidney Disease (CKD):     Stage 1 with normal or high GFR (GFR > 90 mL/min/1 73 square meters)    Stage 2 Mild CKD (GFR = 60-89 mL/min/1 73 square meters)    Stage 3A Moderate CKD (GFR = 45-59 mL/min/1 73 square meters)    Stage 3B Moderate CKD (GFR = 30-44 mL/min/1 73 square meters)    Stage 4 Severe CKD (GFR = 15-29 mL/min/1 73 square meters)    Stage 5 End Stage CKD (GFR <15 mL/min/1 73 square meters)  Note: GFR calculation is accurate only with a steady state creatinine    CBC and differential [[id number]] Collected:  09/12/20 0812    Lab Status:  Final result Specimen:  Blood from Arm, Right Updated:  09/12/20 0828     WBC 8 28 Thousand/uL      RBC 4 81 Million/uL      Hemoglobin 14 4 g/dL      Hematocrit 45 6 %      MCV 95 fL      MCH 29 9 pg      MCHC 31 6 g/dL      RDW 12 2 %      MPV 9 6 fL      Platelets 858 Thousands/uL      nRBC 0 /100 WBCs      Neutrophils Relative 66 %      Immat GRANS % 0 %      Lymphocytes Relative 24 %      Monocytes Relative 9 %      Eosinophils Relative 1 %      Basophils Relative 0 %      Neutrophils Absolute 5 47 Thousands/µL      Immature Grans Absolute 0 02 Thousand/uL      Lymphocytes Absolute 1 95 Thousands/µL      Monocytes Absolute 0 70 Thousand/µL      Eosinophils Absolute 0 12 Thousand/µL      Basophils Absolute 0 02 Thousands/µL     Urine Microscopic [[id number]]  (Abnormal) Collected:  09/12/20 0750    Lab Status:  Final result Specimen:  Urine, Other Updated:  09/12/20 0825     RBC, UA None Seen /hpf      WBC, UA Innumerable /hpf      Epithelial Cells None Seen /hpf      Bacteria, UA Occasional /hpf      Hyaline Casts, UA None Seen /lpf     Urine culture [[id number]] Collected:  09/12/20 0750    Lab Status:   In process Specimen:  Urine, Other Updated:  09/12/20 0825    POCT pregnancy, urine [[id number]]  (Normal) Resulted:  09/12/20 0753    Lab Status:  Final result Updated:  09/12/20 0753     EXT PREG TEST UR (Ref: Negative) negative     Control Valid    POCT urinalysis dipstick [[id number]]  (Normal) Resulted:  09/12/20 0752    Lab Status:  Final result Specimen:  Urine Updated:  09/12/20 0753     Color, UA -    Urine Macroscopic, POC [[id number]]  (Abnormal) Collected:  09/12/20 0750    Lab Status:  Final result Specimen:  Urine Updated:  09/12/20 0752     Color, UA Yellow     Clarity, UA Clear     pH, UA 6 5     Leukocytes, UA Small     Nitrite, UA Negative     Protein, UA Negative mg/dl      Glucose, UA Negative mg/dl      Ketones, UA Negative mg/dl      Urobilinogen, UA 0 2 E U /dl      Bilirubin, UA Negative     Blood, UA Small     Specific [place], UA 1 015    Narrative:       CLINITEK RESULT                 No orders to display         Procedures  Procedures      ED Course  ED Course as of Sep 12 0932   Sat Sep 12, 2020   6302 Small leukocytes without nitrites  Awaiting micro  Leukocytes, UA(!): Small   0756 Preg negative  POCT pregnancy, urine   0825 Innumerable WBCs with occasional bacteria  This increases concern for possible chlamydia and PID  Will perform pelvic with swabs  WBC, UA(!): Innumerable   0836 Reviewed and without derangement  CBC and differential   0837 No evidence of kidney injury from possible retention  Creatinine: 0 83   0913 Pt no longer tachycardic  Pulse: 96                                       MDM  Number of Diagnoses or Management Options  UTI (urinary tract infection):   Vaginal itching:   Diagnosis management comments: Pt is a 53yo F who presents with urinary frequency and urgency  Exam pertinent for minimal right lower quadrant abdominal tenderness and minimal vaginal discharge without cervical motion tenderness on pelvic exam     Differential diagnosis to include but not limited to UTI, pyelo nephritis, nephrolithiasis, cervicitis, PID, STI, appendicitis  CBC and BMP ordered to look for leukocytosis or acute kidney injury  CBC and BMP both unremarkable and creatinine within normal limits and not significantly increased from prior making kidney injury due to possible retention less likely at this time  UA showed leukocytes without nitrites as well as innumerable wbc's with only occasional bacteria  Due to this, increased concern for STIs and possibly PID  Pelvic exam was performed and showed minimal vaginal discharge without evidence of lesion, erythema, or cervical motion tenderness  This is not indicative of PID and therefore requires no intervention at this time  Appendicitis considered for differential due to right lower quadrant tenderness however low suspicion at this time due to patient being afebrile without elevated white blood cell count    Patient is also not had worsening pain and is not describing any pain in her right lower quadrant  No indication for CT scan at this time  Will treat patient for UTI with Keflex  First dose given here  Will also provide patient with single dose of Diflucan for vaginal itching  Discussed with patient that chlamydia and gonorrhea swabs were performed during pelvic exam and she will call with results if she needs to change her medications  Plan to discharge patient with follow-up to PCP  Discussed returning to the emergency department with significant worsening of symptoms or development of a fever  Discussed taking her new medication as prescribed and to completion  Patient expressed understanding of discharge instructions, medication instructions, and return precautions  All questions were answered  Patient was discharged without incident  Amount and/or Complexity of Data Reviewed  Clinical lab tests: ordered and reviewed  Discussion of test results with the performing providers: yes          Disposition  Final diagnoses:   UTI (urinary tract infection)   Vaginal itching     Time reflects when diagnosis was documented in both MDM as applicable and the Disposition within this note     Time User Action Codes Description Comment    9/12/2020  9:09 AM Zurdo Aviles Add [N39 0] UTI (urinary tract infection)     9/12/2020  9:09 AM Zurdo Aviles Add [N89 8] Vaginal itching       ED Disposition     ED Disposition Condition Date/Time Comment    Discharge Stable Sat Sep 12, 2020  9:06 AM Bhargav Moat discharge to home/self care              Follow-up Information     Follow up With Specialties Details Why [address]  [facility], DO Internal Medicine   [id number]  [phone]            Discharge Medication List as of 9/12/2020  9:14 AM      START taking these medications    Details   cephalexin (KEFLEX) 500 mg capsule Take 1 capsule (500 mg total) by mouth 2 (two) times a day for 5 days, Starting Sat 9/12/2020, Until Thu 9/17/2020, Normal         CONTINUE these medications which have NOT CHANGED    Details   Cholecalciferol (VITAMIN D3) 1000 units CAPS Take by mouth, Historical Med      clindamycin (CLEOCIN) 2 % vaginal cream Starting Tue 3/3/2020, Historical Med      clonazePAM (KlonoPIN) 0 5 mg tablet take one nightly, Historical Med      clotrimazole-betamethasone (LOTRISONE) 1-0 05 % cream Apply topically 2 (two) times a day, Starting Tue 2/4/2020, Normal      hydrocortisone-acetic acid (VOSOL-HC) otic solution Administer 3 drops into both ears 4 (four) times a day, Starting Thu 6/4/2020, Normal      levothyroxine 75 mcg tablet Take 1 tablet (75 mcg total) by mouth daily, Starting Mon 6/1/2020, Normal      metroNIDAZOLE (METROGEL) 0 75 % vaginal gel Starting Wed 1/22/2020, Historical Med      montelukast (SINGULAIR) 10 mg tablet Take 1 tablet (10 mg total) by mouth daily at bedtime, Starting Mon 6/1/2020, Normal      nystatin (MYCOSTATIN) cream Apply 1 application topically 2 (two) times a day To affected area, Starting Fri 2/7/2020, Historical Med      oxybutynin (DITROPAN-XL) 5 mg 24 hr tablet Take 5 mg by mouth daily, Starting Tue 4/21/2020, Historical Med      risperiDONE (RisperDAL) 3 mg tablet Take 3 mg by mouth every evening, Starting Wed 4/15/2020, Historical Med      sucralfate (CARAFATE) 1 g/10 mL suspension Take 10 mL (1 g total) by mouth 4 (four) times a day, Starting Wed 10/23/2019, Print      triamcinolone (KENALOG) 0 1 % cream Apply 1 application topically 2 (two) times a day To affected area, Starting Fri 2/7/2020, Historical Med      trihexyphenidyl (ARTANE) 5 mg tablet Take 10 mg by mouth, Starting Wed 5/20/2020, Until Tue 8/18/2020, Historical Med      VIIBRYD 20 MG tablet Take 20 mg by mouth daily, Starting Sun 7/28/2019, Historical Med           No discharge procedures on file  PDMP Review     None           ED Provider  Attending physically available and evaluated Severo Rick I managed the patient along with the ED Attending      Electronically Signed by         Luanna Leventhal, MD  09/12/20 0615

## 2020-09-13 DIAGNOSIS — J30.1 NON-SEASONAL ALLERGIC RHINITIS DUE TO POLLEN: ICD-10-CM

## 2020-09-13 LAB
C TRACH DNA SPEC QL NAA+PROBE: NEGATIVE
N GONORRHOEA DNA SPEC QL NAA+PROBE: NEGATIVE

## 2020-09-14 LAB — BACTERIA UR CULT: ABNORMAL

## 2020-09-14 RX ORDER — MONTELUKAST SODIUM 10 MG/1
TABLET ORAL
Qty: 90 TABLET | Refills: 0 | Status: SHIPPED | OUTPATIENT
Start: 2020-09-14 | End: 2020-09-25

## 2020-09-25 DIAGNOSIS — J30.1 NON-SEASONAL ALLERGIC RHINITIS DUE TO POLLEN: ICD-10-CM

## 2020-09-25 RX ORDER — MONTELUKAST SODIUM 10 MG/1
TABLET ORAL
Qty: 90 TABLET | Refills: 0 | Status: SHIPPED | OUTPATIENT
Start: 2020-09-25 | End: 2021-01-27 | Stop reason: SDUPTHER

## 2020-11-02 ENCOUNTER — VIRTUAL VISIT (OUTPATIENT)
Dept: FAMILY MEDICINE | Facility: OTHER | Age: 50
End: 2020-11-02

## 2020-11-02 NOTE — PROGRESS NOTES
"Date: 2020 08:13:13  Clinician: Bela Robles  Clinician NPI: [id number]  Patient: Corinna Kinney  Patient : 1970  Patient Address: [address] [place] [address]  Patient Phone: [phone]  Visit Protocol: URI  Patient Summary:  Corinna is a 50 year old ( : 1970 ) female who initiated a [facility] Visit for COVID-19 (Coronavirus) evaluation and screening. When asked the question \"Please sign me up to receive news, health information and promotions from [facility].\", Corinna responded \"Yes\".    Corinna states her symptoms started 1-2 days ago.   Her symptoms consist of a headache, a cough, nasal congestion, myalgia, chills, malaise, a sore throat, and rhinitis. Corinna also feels feverish.   Symptom details     Nasal secretions: The color of her mucus is clear.    Cough: Corinna coughs a few times an hour and her cough is not more bothersome at night. Phlegm does not come into her throat when she coughs. She does not believe her cough is caused by post-nasal drip.     Sore throat: Corinna reports having moderate throat pain (4-6 on a 10 point pain scale), does not have exudate on her tonsils, and can swallow liquids. The lymph nodes in her neck are not enlarged. A rash has not appeared on the skin since the sore throat started.     Temperature: Her current temperature is 99.1 degrees Fahrenheit.     Headache: She states the headache is moderate (4-6 on a 10 point pain scale).      Corinna denies having ear pain, wheezing, enlarged lymph nodes, nausea, facial pain or pressure, teeth pain, ageusia, diarrhea, anosmia, and vomiting. She also denies having recent facial or sinus surgery in the past 60 days, having a sinus infection within the past year, and taking antibiotic medication in the past month. She is not experiencing dyspnea.   Precipitating events  Within the past week, Corinna has not been exposed to someone with strep throat. She has not recently been exposed " to someone with influenza. Corinna has not been in close contact with any high risk individuals.   Pertinent COVID-19 (Coronavirus) information  Corinna does not work or volunteer as healthcare worker or a . In the past 14 days, Corinna has not worked or volunteered at a healthcare facility or group living setting.   In the past 14 days, she also has not lived in a congregate living setting.   Corinna has not had a close contact with a laboratory-confirmed COVID-19 patient within 14 days of symptom onset.    Since December 2019, Corinna has not been diagnosed with lab-confirmed COVID-19 test and has not had upper respiratory infection or influenza-like illness.   Pertinent medical history  Corinna does not get yeast infections when she takes antibiotics.   Corinna does not need a return to work/school note.   Weight: 142 lbs   Corinna does not smoke or use smokeless tobacco.   She denies pregnancy and denies breastfeeding. She has menstruated in the past month.   Additional information as reported by the patient (free text): I'm just returning home from [place]   Weight: 142 lbs    MEDICATIONS: No current medications, ALLERGIES: NKDA  Clinician Response:  Dear Corinna,   Your symptoms show that you may have coronavirus (COVID-19). This illness can cause fever, cough and trouble breathing. Many people get a mild case and get better on their own. Some people can get very sick.  What should I do?  We would like to test you for this virus.   1. Please call [phone] to schedule your visit. Explain that [facility] COVID-19 test. Be ready to share your [facility] visit ID number.  The following will serve as your written order for this COVID Test, ordered by me, for the indication of suspected COVID [Z20.828]: The test will be ordered in [employer], our electronic health record, after you are scheduled. It will show as ordered and authorized by Taqueria Fortune MD.  Order:  "COVID-19 (Coronavirus) PCR for SYMPTOMATIC testing from [facility].   2. When it's time for your COVID test:  Stay at least 6 feet away from others. (If someone will drive you to your test, stay in the backseat, as far away from the  as you can.)   Cover your mouth and nose with a mask, tissue or washcloth.  Go straight to the testing site. Don't make any stops on the way there or back.      3.Starting now: Stay home and away from others (self-isolate) until:   You've had no fever---and no medicine that reduces fever---for one full day (24 hours). And...   Your other symptoms have gotten better. For example, your cough or breathing has improved. And...   At least 10 days have passed since your symptoms started.       During this time, don't leave the house except for testing or medical care.   Stay in your own room, even for meals. Use your own bathroom if you can.   Stay away from others in your home. No hugging, kissing or shaking hands. No visitors.  Don't go to work, school or anywhere else.    Clean \"high touch\" surfaces often (doorknobs, counters, handles, etc.). Use a household cleaning spray or wipes. You'll find a full list of  on the EPA website: www.epa.gov/pesticide-registration/list-n-disinfectants-use-against-sars-cov-2.   Cover your mouth and nose with a mask, tissue or washcloth to avoid spreading germs.  Wash your hands and face often. Use soap and water.  Caregivers in these groups are at risk for severe illness due to COVID-19:  o People 65 years and older  o People who live in a nursing home or long-term care facility  o People with chronic disease (lung, heart, cancer, diabetes, kidney, liver, immunologic)  o People who have a weakened immune system, including those who:   Are in cancer treatment  Take medicine that weakens the immune system, such as corticosteroids  Had a bone marrow or organ transplant  Have an immune deficiency  Have poorly controlled HIV or AIDS  Are obese (body " mass index of 40 or higher)  Smoke regularly   o Caregivers should wear gloves while washing dishes, handling laundry and cleaning bedrooms and bathrooms.  o Use caution when washing and drying laundry: Don't shake dirty laundry, and use the warmest water setting that you can.  o For more tips, go to www.cdc.gov/coronavirus/2019-ncov/downloads/10Things.pdf.       How can I take care of myself?   Get lots of rest. Drink extra fluids (unless a doctor has told you not to).   Take Tylenol (acetaminophen) for fever or pain. If you have liver or kidney problems, ask your family doctor if it's okay to take Tylenol.   Adults can take either:    650 mg (two 325 mg pills) every 4 to 6 hours, or...   1,000 mg (two 500 mg pills) every 8 hours as needed.    Note: Don't take more than 3,000 mg in one day. Acetaminophen is found in many medicines (both prescribed and over-the-counter medicines). Read all labels to be sure you don't take too much.   For children, check the Tylenol bottle for the right dose. The dose is based on the child's age or weight.    If you have other health problems (like cancer, heart failure, an organ transplant or severe kidney disease): Call your specialty clinic if you don't feel better in the next 2 days.       Know when to call 911. Emergency warning signs include:    Trouble breathing or shortness of breath Pain or pressure in the chest that doesn't go away Feeling confused like you haven't felt before, or not being able to wake up Bluish-colored lips or face.  Where can I get more information?    [facility] -- About COVID-19: [email or web address]   CDC -- What to Do If You're Sick: www.cdc.gov/coronavirus/2019-ncov/about/steps-when-sick.html   CDC -- Ending Home Isolation: www.cdc.gov/coronavirus/2019-ncov/hcp/disposition-in-home-patients.html   CDC -- Caring for Someone: www.cdc.gov/coronavirus/2019-ncov/if-you-are-sick/care-for-someone.html   [facility] -- Interim Guidance for Hospital  Discharge to Home: www.health.[facility].mn.us/diseases/coronavirus/hcp/hospdischarge.pdf   [facility] clinical trials (COVID-19 research studies): clinicalaffairs.[facility]/n-clinical-trials    Below are the COVID-19 hotlines at the [place] [facility] ([facility]). Interpreters are available.    For health questions: Call 815-435-6088 or 1-489.791.5586 (7 a.m. to 7 p.m.) For questions about schools and childcare: Call 002-796-9634 or 1-540.978.3720 (7 a.m. to 7 p.m.)    Diagnosis: Contact with and (suspected) exposure to other viral communicable diseases  Diagnosis ICD: Z20.828

## 2020-11-03 ENCOUNTER — AMBULATORY - HEALTHEAST (OUTPATIENT)
Dept: FAMILY MEDICINE | Facility: CLINIC | Age: 50
End: 2020-11-03

## 2020-11-03 DIAGNOSIS — Z20.822 SUSPECTED 2019 NOVEL CORONAVIRUS INFECTION: ICD-10-CM

## 2020-11-05 ENCOUNTER — COMMUNICATION - HEALTHEAST (OUTPATIENT)
Dept: SCHEDULING | Facility: CLINIC | Age: 50
End: 2020-11-05

## 2020-11-09 ENCOUNTER — COMMUNICATION - HEALTHEAST (OUTPATIENT)
Dept: INTERNAL MEDICINE | Facility: CLINIC | Age: 50
End: 2020-11-09

## 2020-11-10 ENCOUNTER — OFFICE VISIT - HEALTHEAST (OUTPATIENT)
Dept: FAMILY MEDICINE | Facility: CLINIC | Age: 50
End: 2020-11-10

## 2020-11-10 DIAGNOSIS — U07.1 2019 NOVEL CORONAVIRUS DISEASE (COVID-19): ICD-10-CM

## 2020-11-18 ENCOUNTER — COMMUNICATION - HEALTHEAST (OUTPATIENT)
Dept: SCHEDULING | Facility: CLINIC | Age: 50
End: 2020-11-18

## 2020-12-08 ENCOUNTER — AMBULATORY - HEALTHEAST (OUTPATIENT)
Dept: ONCOLOGY | Facility: HOSPITAL | Age: 50
End: 2020-12-08

## 2020-12-08 DIAGNOSIS — C50.411 MALIGNANT NEOPLASM OF UPPER-OUTER QUADRANT OF RIGHT BREAST IN FEMALE, ESTROGEN RECEPTOR POSITIVE (H): ICD-10-CM

## 2020-12-08 DIAGNOSIS — C43.9 MELANOMA OF SKIN (H): ICD-10-CM

## 2020-12-08 DIAGNOSIS — Z17.0 MALIGNANT NEOPLASM OF UPPER-OUTER QUADRANT OF RIGHT BREAST IN FEMALE, ESTROGEN RECEPTOR POSITIVE (H): ICD-10-CM

## 2020-12-09 ENCOUNTER — AMBULATORY - HEALTHEAST (OUTPATIENT)
Dept: INFUSION THERAPY | Facility: HOSPITAL | Age: 50
End: 2020-12-09

## 2020-12-09 ENCOUNTER — OFFICE VISIT - HEALTHEAST (OUTPATIENT)
Dept: ONCOLOGY | Facility: HOSPITAL | Age: 50
End: 2020-12-09

## 2020-12-09 DIAGNOSIS — Z17.0 MALIGNANT NEOPLASM OF UPPER-OUTER QUADRANT OF RIGHT BREAST IN FEMALE, ESTROGEN RECEPTOR POSITIVE (H): ICD-10-CM

## 2020-12-09 DIAGNOSIS — C50.411 MALIGNANT NEOPLASM OF UPPER-OUTER QUADRANT OF RIGHT BREAST IN FEMALE, ESTROGEN RECEPTOR NEGATIVE (H): ICD-10-CM

## 2020-12-09 DIAGNOSIS — C50.411 MALIGNANT NEOPLASM OF UPPER-OUTER QUADRANT OF RIGHT BREAST IN FEMALE, ESTROGEN RECEPTOR POSITIVE (H): ICD-10-CM

## 2020-12-09 DIAGNOSIS — C43.9 MELANOMA OF SKIN (H): ICD-10-CM

## 2020-12-09 DIAGNOSIS — Z17.1 MALIGNANT NEOPLASM OF UPPER-OUTER QUADRANT OF RIGHT BREAST IN FEMALE, ESTROGEN RECEPTOR NEGATIVE (H): ICD-10-CM

## 2020-12-09 DIAGNOSIS — Z85.820 HISTORY OF MELANOMA: ICD-10-CM

## 2020-12-09 LAB
ALBUMIN SERPL-MCNC: 4.4 G/DL (ref 3.5–5)
ALP SERPL-CCNC: 59 U/L (ref 45–120)
ALT SERPL W P-5'-P-CCNC: 33 U/L (ref 0–45)
ANION GAP SERPL CALCULATED.3IONS-SCNC: 8 MMOL/L (ref 5–18)
AST SERPL W P-5'-P-CCNC: 20 U/L (ref 0–40)
BASOPHILS # BLD AUTO: 0.1 THOU/UL (ref 0–0.2)
BASOPHILS NFR BLD AUTO: 2 % (ref 0–2)
BILIRUB SERPL-MCNC: 0.7 MG/DL (ref 0–1)
BUN SERPL-MCNC: 12 MG/DL (ref 8–22)
CALCIUM SERPL-MCNC: 9.5 MG/DL (ref 8.5–10.5)
CHLORIDE BLD-SCNC: 105 MMOL/L (ref 98–107)
CO2 SERPL-SCNC: 28 MMOL/L (ref 22–31)
CREAT SERPL-MCNC: 0.84 MG/DL (ref 0.6–1.1)
EOSINOPHIL # BLD AUTO: 0.2 THOU/UL (ref 0–0.4)
EOSINOPHIL NFR BLD AUTO: 3 % (ref 0–6)
ERYTHROCYTE [DISTWIDTH] IN BLOOD BY AUTOMATED COUNT: 12.3 % (ref 11–14.5)
GFR SERPL CREATININE-BSD FRML MDRD: >60 ML/MIN/1.73M2
GLUCOSE BLD-MCNC: 112 MG/DL (ref 70–125)
HCT VFR BLD AUTO: 43.1 % (ref 35–47)
HGB BLD-MCNC: 14.5 G/DL (ref 12–16)
IMM GRANULOCYTES # BLD: 0 THOU/UL
IMM GRANULOCYTES NFR BLD: 0 %
LDH SERPL L TO P-CCNC: 164 U/L (ref 125–220)
LYMPHOCYTES # BLD AUTO: 1.3 THOU/UL (ref 0.8–4.4)
LYMPHOCYTES NFR BLD AUTO: 24 % (ref 20–40)
MCH RBC QN AUTO: 29.2 PG (ref 27–34)
MCHC RBC AUTO-ENTMCNC: 33.6 G/DL (ref 32–36)
MCV RBC AUTO: 87 FL (ref 80–100)
MONOCYTES # BLD AUTO: 0.4 THOU/UL (ref 0–0.9)
MONOCYTES NFR BLD AUTO: 7 % (ref 2–10)
NEUTROPHILS # BLD AUTO: 3.4 THOU/UL (ref 2–7.7)
NEUTROPHILS NFR BLD AUTO: 65 % (ref 50–70)
PLATELET # BLD AUTO: 275 THOU/UL (ref 140–440)
PMV BLD AUTO: 8.9 FL (ref 8.5–12.5)
POTASSIUM BLD-SCNC: 4.1 MMOL/L (ref 3.5–5)
PROT SERPL-MCNC: 7.3 G/DL (ref 6–8)
RBC # BLD AUTO: 4.96 MILL/UL (ref 3.8–5.4)
SODIUM SERPL-SCNC: 141 MMOL/L (ref 136–145)
WBC: 5.3 THOU/UL (ref 4–11)

## 2020-12-19 DIAGNOSIS — E03.9 HYPOTHYROIDISM, UNSPECIFIED TYPE: ICD-10-CM

## 2020-12-21 RX ORDER — LEVOTHYROXINE SODIUM 0.07 MG/1
TABLET ORAL
Qty: 90 TABLET | Refills: 0 | Status: SHIPPED | OUTPATIENT
Start: 2020-12-21 | End: 2021-01-27 | Stop reason: SDUPTHER

## 2021-01-23 ENCOUNTER — NURSE TRIAGE (OUTPATIENT)
Dept: OTHER | Facility: OTHER | Age: 51
End: 2021-01-23

## 2021-01-23 DIAGNOSIS — R05.9 COUGH: Primary | ICD-10-CM

## 2021-01-23 RX ORDER — AZITHROMYCIN 250 MG/1
TABLET, FILM COATED ORAL
Qty: 6 TABLET | Refills: 0 | Status: SHIPPED | OUTPATIENT
Start: 2021-01-23 | End: 2021-01-27 | Stop reason: ALTCHOICE

## 2021-01-23 NOTE — TELEPHONE ENCOUNTER
TT sent to Dr Kang Romano with patient's s/s  Per Dr Kang Romano, call in a Z-pack for the patient  Azithromycin 250mg  2 tablets for the first day and then 1 tablet for 4 days  If symptoms do not improve, then follow up with PCP next week  If symptoms worsen or problems breathing occur, patient needs to call back  Made patient aware and verbalized understanding  Confirmed pharmacy with the patient and order placed for z-pack

## 2021-01-23 NOTE — TELEPHONE ENCOUNTER
Pt called due to a continuous non-productive cough that has been present for 2 weeks  She reports she feels chest congestion but is unable to get anything up  She has tried Robitussin, cough drops, and vicks with minimal improvement  Denies other symptoms and no exposure to COVID  Reason for Disposition   [1] Continuous (nonstop) coughing interferes with work or school AND [2] no improvement using cough treatment per protocol    Answer Assessment - Initial Assessment Questions  1  ONSET: "When did the cough begin?"       2 weeks ago     2  SEVERITY: "How bad is the cough today?"       Severe    3  RESPIRATORY DISTRESS: "Describe your breathing "       Normal breathing    4  FEVER: "Do you have a fever?" If so, ask: "What is your temperature, how was it measured, and when did it start?"      Denies     5  HEMOPTYSIS: "Are you coughing up any blood?" If so ask: "How much?" (flecks, streaks, tablespoons, etc )      Denies     6  TREATMENT: "What have you done so far to treat the cough?" (e g , meds, fluids, humidifier)      Robitussin, cough drops, and Vicks    7  CARDIAC HISTORY: "Do you have any history of heart disease?" (e g , heart attack, congestive heart failure)       Denies     8  LUNG HISTORY: "Do you have any history of lung disease?"  (e g , pulmonary embolus, asthma, emphysema)      Denies     9  PE RISK FACTORS: "Do you have a history of blood clots?" (or: recent major surgery, recent prolonged travel, bedridden)      Denies     10  OTHER SYMPTOMS: "Do you have any other symptoms? (e g , runny nose, wheezing, chest pain)        Denies     11  PREGNANCY: "Is there any chance you are pregnant?" "When was your last menstrual period?"        Denies     12   TRAVEL: "Have you traveled out of the country in the last month?" (e g , travel history, exposures)        Denies    Protocols used: COUGH - ACUTE NON-PRODUCTIVE-ADULT-AH

## 2021-01-23 NOTE — TELEPHONE ENCOUNTER
Regarding: cough and a lot of mucus  ----- Message from Karla Elizondo sent at 1/23/2021  9:38 AM EST -----  " I have a cough and a lot of mucus, wondering if I can get something for it"

## 2021-01-27 ENCOUNTER — TELEMEDICINE (OUTPATIENT)
Dept: INTERNAL MEDICINE CLINIC | Facility: CLINIC | Age: 51
End: 2021-01-27
Payer: COMMERCIAL

## 2021-01-27 DIAGNOSIS — J06.9 UPPER RESPIRATORY TRACT INFECTION, UNSPECIFIED TYPE: Primary | ICD-10-CM

## 2021-01-27 DIAGNOSIS — H60.8X9: ICD-10-CM

## 2021-01-27 DIAGNOSIS — E03.9 HYPOTHYROIDISM, UNSPECIFIED TYPE: ICD-10-CM

## 2021-01-27 DIAGNOSIS — J30.1 NON-SEASONAL ALLERGIC RHINITIS DUE TO POLLEN: ICD-10-CM

## 2021-01-27 PROCEDURE — G2012 BRIEF CHECK IN BY MD/QHP: HCPCS | Performed by: NURSE PRACTITIONER

## 2021-01-27 RX ORDER — RISPERIDONE 4 MG/1
1.5 TABLET, FILM COATED ORAL DAILY
COMMUNITY
Start: 2021-01-12

## 2021-01-27 RX ORDER — LEVOTHYROXINE SODIUM 0.07 MG/1
75 TABLET ORAL DAILY
Qty: 90 TABLET | Refills: 1 | Status: SHIPPED | OUTPATIENT
Start: 2021-01-27 | End: 2021-03-04 | Stop reason: SDUPTHER

## 2021-01-27 RX ORDER — HYDROCORTISONE AND ACETIC ACID 20.75; 10.375 MG/ML; MG/ML
3 SOLUTION AURICULAR (OTIC) 4 TIMES DAILY
Qty: 10 ML | Refills: 3 | Status: SHIPPED | OUTPATIENT
Start: 2021-01-27

## 2021-01-27 RX ORDER — CLONAZEPAM 1 MG/1
1 TABLET ORAL
COMMUNITY
Start: 2021-01-12 | End: 2022-03-21

## 2021-01-27 RX ORDER — TRIHEXYPHENIDYL HYDROCHLORIDE 5 MG/1
10 TABLET ORAL
COMMUNITY
Start: 2021-01-12 | End: 2021-04-12

## 2021-01-27 RX ORDER — MONTELUKAST SODIUM 10 MG/1
10 TABLET ORAL
Qty: 90 TABLET | Refills: 1 | Status: SHIPPED | OUTPATIENT
Start: 2021-01-27 | End: 2021-03-04 | Stop reason: SDUPTHER

## 2021-01-27 NOTE — ASSESSMENT & PLAN NOTE
Finished zpack  Drink plenty of fluids and rest  May take over the counter mucinex and cough syrup/cough drops  Call if worsening

## 2021-01-27 NOTE — PROGRESS NOTES
Virtual Brief Visit    Assessment/Plan:    Problem List Items Addressed This Visit        Endocrine    Hypothyroidism    Relevant Medications    levothyroxine 75 mcg tablet       Respiratory    Non-seasonal allergic rhinitis due to pollen    Relevant Medications    montelukast (SINGULAIR) 10 mg tablet    Upper respiratory tract infection - Primary     Finished zpack  Drink plenty of fluids and rest  May take over the counter mucinex and cough syrup/cough drops  Call if worsening  Nervous and Auditory    Actinic otitis externa    Relevant Medications    hydrocortisone-acetic acid (VOSOL-HC) otic solution                Reason for visit is   Chief Complaint   Patient presents with    Virtual Brief Visit        Encounter provider WAI Vasques    Provider located at [address] [place] [id number]    Recent Visits  No visits were found meeting these conditions  Showing recent visits within past 7 days and meeting all other requirements     Today's Visits  Date Type Provider Dept   01/27/21 Telemedicine Saul Vasques today's visits and meeting all other requirements     Future Appointments  No visits were found meeting these conditions  Showing future appointments within next 150 days and meeting all other requirements        After connecting through telephone, the patient was identified by name and date of birth  Loni Conway was informed that this is a telemedicine visit and that the visit is being conducted through telephone  My office door was closed  No one else was in the room  She acknowledged consent and understanding of privacy and security of the platform  The patient has agreed to participate and understands she can discontinue the visit at any time  Patient is aware this is a billable service  Subjective    Loni Conway is a 53 y o  female     Patient is here for follow up of URI  Has a cough and congestion  Prescribed a zpack on 1/23/21  Feeling slightly better  No sob, no fever       Past Medical History:   Diagnosis Date    Anxiety     Bipolar disorder ([address] )     Depression     Disease of thyroid gland     Gallbladder calculus     Infectious viral hepatitis 2009    Hep C, treated    Wears glasses        Past Surgical History:   Procedure Laterality Date    LIVER BIOPSY      Last assessed: 7/1/15    NC LAP,CHOLECYSTECTOMY N/A 7/17/2019    Procedure: LAPAROSCOPIC CHOLECYSTECTOMY;  Surgeon: Preethi Dang MD;  Location: [facility];  Service: General    TUBAL LIGATION         Current Outpatient Medications   Medication Sig Dispense Refill    Cholecalciferol (VITAMIN D3) 1000 units CAPS Take by mouth      clindamycin (CLEOCIN) 2 % vaginal cream       clonazePAM (KlonoPIN) 1 mg tablet Take 1 mg by mouth      clotrimazole-betamethasone (LOTRISONE) 1-0 05 % cream Apply topically 2 (two) times a day 30 g 0    hydrocortisone-acetic acid (VOSOL-HC) otic solution Administer 3 drops into both ears 4 (four) times a day 10 mL 3    levothyroxine 75 mcg tablet Take 1 tablet (75 mcg total) by mouth daily 90 tablet 1    metroNIDAZOLE (METROGEL) 0 75 % vaginal gel       montelukast (SINGULAIR) 10 mg tablet Take 1 tablet (10 mg total) by mouth daily at bedtime 90 tablet 1    nystatin (MYCOSTATIN) cream Apply 1 application topically 2 (two) times a day To affected area      oxybutynin (DITROPAN-XL) 5 mg 24 hr tablet Take 5 mg by mouth daily      risperiDONE (RisperDAL) 4 mg tablet Take 1 5 tablets by mouth daily Take 1 5 tablets each evening      triamcinolone (KENALOG) 0 1 % cream Apply 1 application topically 2 (two) times a day To affected area      trihexyphenidyl (ARTANE) 5 mg tablet Take 10 mg by mouth      VIIBRYD 20 MG tablet Take 20 mg by mouth daily  2    sucralfate (CARAFATE) 1 g/10 mL suspension Take 10 mL (1 g total) by mouth 4 (four) times a day (Patient not taking: Reported on 2/4/2020) 420 mL 0     No current facility-administered medications for this visit  Allergies   Allergen Reactions    Citalopram Hives     Reaction Date: 27Feb2012;     Lamotrigine Hives       Review of Systems   Constitutional: Positive for fatigue  HENT: Positive for congestion  Eyes: Negative  Respiratory: Positive for cough  Cardiovascular: Negative  Gastrointestinal: Negative  Musculoskeletal: Negative  Neurological: Negative  There were no vitals filed for this visit  I spent 15 minutes directly with the patient during this visit    VIRTUAL VISIT [address] acknowledges that she has consented to an online visit or consultation  She understands that the online visit is based solely on information provided by her, and that, in the absence of a face-to-face physical evaluation by the physician, the diagnosis she receives is both limited and provisional in terms of accuracy and completeness  This is not intended to replace a full medical face-to-face evaluation by the physician  Loni Conway understands and accepts these terms

## 2021-03-04 ENCOUNTER — OFFICE VISIT (OUTPATIENT)
Dept: INTERNAL MEDICINE CLINIC | Facility: CLINIC | Age: 51
End: 2021-03-04
Payer: COMMERCIAL

## 2021-03-04 ENCOUNTER — TELEPHONE (OUTPATIENT)
Dept: INTERNAL MEDICINE CLINIC | Facility: CLINIC | Age: 51
End: 2021-03-04

## 2021-03-04 VITALS
WEIGHT: 197.6 LBS | OXYGEN SATURATION: 98 % | SYSTOLIC BLOOD PRESSURE: 134 MMHG | RESPIRATION RATE: 16 BRPM | HEIGHT: 62 IN | BODY MASS INDEX: 36.36 KG/M2 | HEART RATE: 82 BPM | DIASTOLIC BLOOD PRESSURE: 70 MMHG

## 2021-03-04 DIAGNOSIS — Z00.00 MEDICARE ANNUAL WELLNESS VISIT, SUBSEQUENT: Primary | ICD-10-CM

## 2021-03-04 DIAGNOSIS — E66.1 CLASS 2 DRUG-INDUCED OBESITY WITHOUT SERIOUS COMORBIDITY WITH BODY MASS INDEX (BMI) OF 36.0 TO 36.9 IN ADULT: ICD-10-CM

## 2021-03-04 DIAGNOSIS — Z23 ENCOUNTER FOR IMMUNIZATION: ICD-10-CM

## 2021-03-04 DIAGNOSIS — E03.9 HYPOTHYROIDISM, UNSPECIFIED TYPE: ICD-10-CM

## 2021-03-04 DIAGNOSIS — Z12.4 SCREENING FOR CERVICAL CANCER: ICD-10-CM

## 2021-03-04 DIAGNOSIS — B17.10 ACUTE HEPATITIS C VIRUS INFECTION WITHOUT HEPATIC COMA: ICD-10-CM

## 2021-03-04 DIAGNOSIS — J30.1 NON-SEASONAL ALLERGIC RHINITIS DUE TO POLLEN: ICD-10-CM

## 2021-03-04 DIAGNOSIS — F32.A DEPRESSION, UNSPECIFIED DEPRESSION TYPE: ICD-10-CM

## 2021-03-04 DIAGNOSIS — Z12.31 ENCOUNTER FOR SCREENING MAMMOGRAM FOR BREAST CANCER: ICD-10-CM

## 2021-03-04 DIAGNOSIS — Z13.6 SCREENING FOR CARDIOVASCULAR CONDITION: ICD-10-CM

## 2021-03-04 DIAGNOSIS — H60.8X9: ICD-10-CM

## 2021-03-04 PROBLEM — E66.812 CLASS 2 DRUG-INDUCED OBESITY WITHOUT SERIOUS COMORBIDITY WITH BODY MASS INDEX (BMI) OF 36.0 TO 36.9 IN ADULT: Status: ACTIVE | Noted: 2019-10-25

## 2021-03-04 PROCEDURE — G0439 PPPS, SUBSEQ VISIT: HCPCS | Performed by: INTERNAL MEDICINE

## 2021-03-04 PROCEDURE — 93000 ELECTROCARDIOGRAM COMPLETE: CPT | Performed by: INTERNAL MEDICINE

## 2021-03-04 PROCEDURE — 1036F TOBACCO NON-USER: CPT | Performed by: INTERNAL MEDICINE

## 2021-03-04 PROCEDURE — 3008F BODY MASS INDEX DOCD: CPT | Performed by: INTERNAL MEDICINE

## 2021-03-04 PROCEDURE — 99214 OFFICE O/P EST MOD 30 MIN: CPT | Performed by: INTERNAL MEDICINE

## 2021-03-04 RX ORDER — MONTELUKAST SODIUM 10 MG/1
10 TABLET ORAL
Qty: 90 TABLET | Refills: 1 | Status: SHIPPED | OUTPATIENT
Start: 2021-03-04 | End: 2021-09-09 | Stop reason: SDUPTHER

## 2021-03-04 RX ORDER — LEVOTHYROXINE SODIUM 0.07 MG/1
75 TABLET ORAL DAILY
Qty: 90 TABLET | Refills: 1 | Status: SHIPPED | OUTPATIENT
Start: 2021-03-04 | End: 2021-09-09 | Stop reason: SDUPTHER

## 2021-03-04 NOTE — ASSESSMENT & PLAN NOTE
· She admits to still having dryness and itching in her ears bilaterally associated with runny nose, itchy and watery eyes  She would like her ear drops refilled if possible  · Currently on singulair 10 mg qd  Plan:  · Continue singulair 10 mg qd    · Try OTC Claritin 10 qd  · Follow up in 4 months

## 2021-03-04 NOTE — TELEPHONE ENCOUNTER
Pt is asking when she needs to have her next EKG done  Also asking if a script for new ear drops would be sent to the pharmacy  Pt uses [facility] [address]        Please advise

## 2021-03-04 NOTE — ASSESSMENT & PLAN NOTE
· Patient has been on Risperdal and Artane 5 mg and is followed by Marcus Sneed  · Last ECG 10/2019 showed NSR with QTc of 409  · Her Viibryd was discontinued 5/2018 for lack of improvement on the medication  · Patient states she is doing well off the Viibryd  · POCT ECG today showed NSR with HR 70 and Qtc of 423    Plan:  · Continue Risperdal and Artane  · F/U with Psychiatry prn  · Continue with walking and dgood dietary habits    · F/U in 4 months

## 2021-03-04 NOTE — ASSESSMENT & PLAN NOTE
Body mass index is 36 14 kg/m²      Plan:   Recommend incorporating a more whole foods plant-predominant diet along with decreasing consumption of red meats and processed foods   Per CDC guidelines, recommend moderate-vigorous intensity exercise for 30 minutes a day for 5 days a week or a total of 150 min/week

## 2021-03-04 NOTE — ASSESSMENT & PLAN NOTE
Lab Results   Component Value Date    [id number] 3 270 06/12/2020     · Patient is currently on Levothyroxine 75 mcg qAM and doing well  Plan:  · Continue Levothyroxine 75 mcg qAM  · Repeat TSH and T4   · Follow up in 4 months

## 2021-03-04 NOTE — PATIENT INSTRUCTIONS
Medicare Preventive Visit Patient Instructions  Thank you for completing your Welcome to Medicare Visit or Medicare Annual Wellness Visit today  Your next wellness visit will be due in one year (3/4/2022)  The screening/preventive services that you may require over the next 5-10 years are detailed below  Some tests may not apply to you based off risk factors and/or age  Screening tests ordered at today's visit but not completed yet may show as past due  Also, please note that scanned in results may not display below  Preventive Screenings:  Service Recommendations Previous Testing/Comments   Colorectal Cancer Screening  * Colonoscopy    * Fecal Occult Blood Test (FOBT)/Fecal Immunochemical Test (FIT)  * Fecal DNA/Cologuard Test  * Flexible Sigmoidoscopy Age: 54-65 years old   Colonoscopy: every 10 years (may be performed more frequently if at higher risk)  OR  FOBT/FIT: every 1 year  OR  Cologuard: every 3 years  OR  Sigmoidoscopy: every 5 years  Screening may be recommended earlier than age 48 if at higher risk for colorectal cancer  Also, an individualized decision between you and your healthcare provider will decide whether screening between the ages of 74-80 would be appropriate  Colonoscopy: Not on file  FOBT/FIT: Not on file  Cologuard: Not on file  Sigmoidoscopy: Not on file         Breast Cancer Screening Age: 36 years old  Frequency: every 1-2 years  Not required if history of left and right mastectomy Mammogram: 06/27/2019       Cervical Cancer Screening Between the ages of 21-29, pap smear recommended once every 3 years  Between the ages of 33-67, can perform pap smear with HPV co-testing every 5 years     Recommendations may differ for women with a history of total hysterectomy, cervical cancer, or abnormal pap smears in past  Pap Smear: 10/21/2015       Hepatitis C Screening Once for adults born between 1945 and 1965  More frequently in patients at high risk for Hepatitis C Hep C Antibody: 05/26/2018       Diabetes Screening 1-2 times per year if you're at risk for diabetes or have pre-diabetes Fasting glucose: 79 mg/dL   A1C: No results in last 5 years       Cholesterol Screening Once every 5 years if you don't have a lipid disorder  May order more often based on risk factors  Lipid panel: 06/12/2020         Other Preventive Screenings Covered by Medicare:  1  Abdominal Aortic Aneurysm (AAA) Screening: covered once if your at risk  You're considered to be at risk if you have a family history of AAA  2  Lung Cancer Screening: covers low dose CT scan once per year if you meet all of the following conditions: (1) Age 50-69; (2) No signs or symptoms of lung cancer; (3) Current smoker or have quit smoking within the last 15 years; (4) You have a tobacco smoking history of at least 30 pack years (packs per day multiplied by number of years you smoked); (5) You get a written order from a healthcare provider  3  Glaucoma Screening: covered annually if you're considered high risk: (1) You have diabetes OR (2) Family history of glaucoma OR (3)  aged 48 and older OR (3)  American aged 72 and older  3  Osteoporosis Screening: covered every 2 years if you meet one of the following conditions: (1) You're estrogen deficient and at risk for osteoporosis based off medical history and other findings; (2) Have a vertebral abnormality; (3) On glucocorticoid therapy for more than 3 months; (4) Have primary hyperparathyroidism; (5) On osteoporosis medications and need to assess response to drug therapy  · Last bone density test (DXA Scan): Not on file  5  HIV Screening: covered annually if you're between the age of 12-76  Also covered annually if you are younger than 13 and older than 72 with risk factors for HIV infection  For pregnant patients, it is covered up to 3 times per pregnancy      Immunizations:  Immunization Recommendations   Influenza Vaccine Annual influenza vaccination during flu season is recommended for all persons aged >= 6 months who do not have contraindications   Pneumococcal Vaccine (Prevnar and Pneumovax)  * Prevnar = PCV13  * Pneumovax = PPSV23   Adults 25-60 years old: 1-3 doses may be recommended based on certain risk factors  Adults 72 years old: Prevnar (PCV13) vaccine recommended followed by Pneumovax (PPSV23) vaccine  If already received PPSV23 since turning 65, then PCV13 recommended at least one year after PPSV23 dose  Hepatitis B Vaccine 3 dose series if at intermediate or high risk (ex: diabetes, end stage renal disease, liver disease)   Tetanus (Td) Vaccine - COST NOT COVERED BY MEDICARE PART B Following completion of primary series, a booster dose should be given every 10 years to maintain immunity against tetanus  Td may also be given as tetanus wound prophylaxis  Tdap Vaccine - COST NOT COVERED BY MEDICARE PART B Recommended at least once for all adults  For pregnant patients, recommended with each pregnancy  Shingles Vaccine (Shingrix) - COST NOT COVERED BY MEDICARE PART B  2 shot series recommended in those aged 48 and above     Health Maintenance Due:      Topic Date Due    MAMMOGRAM  06/27/2020    Cervical Cancer Screening  10/21/2020    HIV Screening  Completed    Hepatitis C Screening  Discontinued     Immunizations Due:      Topic Date Due    Pneumococcal Vaccine: Pediatrics (0 to 5 Years) and At-Risk Patients (6 to 59 Years) (1 of 1 - PPSV23) 03/26/1976     Advance Directives   What are advance directives? Advance directives are legal documents that state your wishes and plans for medical care  These plans are made ahead of time in case you lose your ability to make decisions for yourself  Advance directives can apply to any medical decision, such as the treatments you want, and if you want to donate organs  What are the types of advance directives? There are many types of advance directives, and each state has rules about how to use them  You may choose a combination of any of the following:  · Living will: This is a written record of the treatment you want  You can also choose which treatments you do not want, which to limit, and which to stop at a certain time  This includes surgery, medicine, IV fluid, and tube feedings  · Durable power of  for healthcare [facility]): This is a written record that states who you want to make healthcare choices for you when you are unable to make them for yourself  This person, called a proxy, is usually a family member or a friend  You may choose more than 1 proxy  · Do not resuscitate (DNR) order:  A DNR order is used in case your heart stops beating or you stop breathing  It is a request not to have certain forms of treatment, such as CPR  A DNR order may be included in other types of advance directives  · Medical directive: This covers the care that you want if you are in a coma, near death, or unable to make decisions for yourself  You can list the treatments you want for each condition  Treatment may include pain medicine, surgery, blood transfusions, dialysis, IV or tube feedings, and a ventilator (breathing machine)  · Values history: This document has questions about your views, beliefs, and how you feel and think about life  This information can help others choose the care that you would choose  Why are advance directives important? An advance directive helps you control your care  Although spoken wishes may be used, it is better to have your wishes written down  Spoken wishes can be misunderstood, or not followed  Treatments may be given even if you do not want them  An advance directive may make it easier for your family to make difficult choices about your care  © Copyright [employer] 2018 Information is for End User's use only and may not be sold, redistributed or otherwise used for commercial purposes   All illustrations and images included in CareNotes® are the copyrighted property of A D A M , Inc  or [facility] Preventive Visit Patient Instructions  Thank you for completing your Welcome to Medicare Visit or Medicare Annual Wellness Visit today  Your next wellness visit will be due in one year (3/4/2022)  The screening/preventive services that you may require over the next 5-10 years are detailed below  Some tests may not apply to you based off risk factors and/or age  Screening tests ordered at today's visit but not completed yet may show as past due  Also, please note that scanned in results may not display below  Preventive Screenings:  Service Recommendations Previous Testing/Comments   Colorectal Cancer Screening  * Colonoscopy    * Fecal Occult Blood Test (FOBT)/Fecal Immunochemical Test (FIT)  * Fecal DNA/Cologuard Test  * Flexible Sigmoidoscopy Age: 54-65 years old   Colonoscopy: every 10 years (may be performed more frequently if at higher risk)  OR  FOBT/FIT: every 1 year  OR  Cologuard: every 3 years  OR  Sigmoidoscopy: every 5 years  Screening may be recommended earlier than age 48 if at higher risk for colorectal cancer  Also, an individualized decision between you and your healthcare provider will decide whether screening between the ages of 74-80 would be appropriate  Colonoscopy: Not on file  FOBT/FIT: Not on file  Cologuard: Not on file  Sigmoidoscopy: Not on file         Breast Cancer Screening Age: 36 years old  Frequency: every 1-2 years  Not required if history of left and right mastectomy Mammogram: 06/27/2019    Screening Current   Cervical Cancer Screening Between the ages of 21-29, pap smear recommended once every 3 years  Between the ages of 33-67, can perform pap smear with HPV co-testing every 5 years     Recommendations may differ for women with a history of total hysterectomy, cervical cancer, or abnormal pap smears in past  Pap Smear: 10/21/2015       Hepatitis C Screening Once for adults born between 1945 and 1965  More frequently in patients at high risk for Hepatitis C Hep C Antibody: 05/26/2018    Screening Not Indicated  History Hepatitis C   Diabetes Screening 1-2 times per year if you're at risk for diabetes or have pre-diabetes Fasting glucose: 79 mg/dL   A1C: No results in last 5 years    Screening Current   Cholesterol Screening Once every 5 years if you don't have a lipid disorder  May order more often based on risk factors  Lipid panel: 06/12/2020    Screening Current     Other Preventive Screenings Covered by Medicare:  6  Abdominal Aortic Aneurysm (AAA) Screening: covered once if your at risk  You're considered to be at risk if you have a family history of AAA  7  Lung Cancer Screening: covers low dose CT scan once per year if you meet all of the following conditions: (1) Age 50-69; (2) No signs or symptoms of lung cancer; (3) Current smoker or have quit smoking within the last 15 years; (4) You have a tobacco smoking history of at least 30 pack years (packs per day multiplied by number of years you smoked); (5) You get a written order from a healthcare provider  8  Glaucoma Screening: covered annually if you're considered high risk: (1) You have diabetes OR (2) Family history of glaucoma OR (3)  aged 48 and older OR (3)  American aged 72 and older  5  Osteoporosis Screening: covered every 2 years if you meet one of the following conditions: (1) You're estrogen deficient and at risk for osteoporosis based off medical history and other findings; (2) Have a vertebral abnormality; (3) On glucocorticoid therapy for more than 3 months; (4) Have primary hyperparathyroidism; (5) On osteoporosis medications and need to assess response to drug therapy  · Last bone density test (DXA Scan): Not on file  10  HIV Screening: covered annually if you're between the age of 12-76  Also covered annually if you are younger than 13 and older than 72 with risk factors for HIV infection   For pregnant patients, it is covered up to 3 times per pregnancy  Immunizations:  Immunization Recommendations   Influenza Vaccine Annual influenza vaccination during flu season is recommended for all persons aged >= 6 months who do not have contraindications   Pneumococcal Vaccine (Prevnar and Pneumovax)  * Prevnar = PCV13  * Pneumovax = PPSV23   Adults 25-60 years old: 1-3 doses may be recommended based on certain risk factors  Adults 72 years old: Prevnar (PCV13) vaccine recommended followed by Pneumovax (PPSV23) vaccine  If already received PPSV23 since turning 65, then PCV13 recommended at least one year after PPSV23 dose  Hepatitis B Vaccine 3 dose series if at intermediate or high risk (ex: diabetes, end stage renal disease, liver disease)   Tetanus (Td) Vaccine - COST NOT COVERED BY MEDICARE PART B Following completion of primary series, a booster dose should be given every 10 years to maintain immunity against tetanus  Td may also be given as tetanus wound prophylaxis  Tdap Vaccine - COST NOT COVERED BY MEDICARE PART B Recommended at least once for all adults  For pregnant patients, recommended with each pregnancy  Shingles Vaccine (Shingrix) - COST NOT COVERED BY MEDICARE PART B  2 shot series recommended in those aged 48 and above     Health Maintenance Due:      Topic Date Due    MAMMOGRAM  06/27/2020    Cervical Cancer Screening  10/21/2020    HIV Screening  Completed    Hepatitis C Screening  Discontinued     Immunizations Due:      Topic Date Due    Pneumococcal Vaccine: Pediatrics (0 to 5 Years) and At-Risk Patients (6 to 59 Years) (1 of 1 - PPSV23) [birth year]1977     Advance Directives   What are advance directives? Advance directives are legal documents that state your wishes and plans for medical care  These plans are made ahead of time in case you lose your ability to make decisions for yourself   Advance directives can apply to any medical decision, such as the treatments you want, and if you want to donate organs  What are the types of advance directives? There are many types of advance directives, and each state has rules about how to use them  You may choose a combination of any of the following:  · Living will: This is a written record of the treatment you want  You can also choose which treatments you do not want, which to limit, and which to stop at a certain time  This includes surgery, medicine, IV fluid, and tube feedings  · Durable power of  for healthcare [facility]): This is a written record that states who you want to make healthcare choices for you when you are unable to make them for yourself  This person, called a proxy, is usually a family member or a friend  You may choose more than 1 proxy  · Do not resuscitate (DNR) order:  A DNR order is used in case your heart stops beating or you stop breathing  It is a request not to have certain forms of treatment, such as CPR  A DNR order may be included in other types of advance directives  · Medical directive: This covers the care that you want if you are in a coma, near death, or unable to make decisions for yourself  You can list the treatments you want for each condition  Treatment may include pain medicine, surgery, blood transfusions, dialysis, IV or tube feedings, and a ventilator (breathing machine)  · Values history: This document has questions about your views, beliefs, and how you feel and think about life  This information can help others choose the care that you would choose  Why are advance directives important? An advance directive helps you control your care  Although spoken wishes may be used, it is better to have your wishes written down  Spoken wishes can be misunderstood, or not followed  Treatments may be given even if you do not want them  An advance directive may make it easier for your family to make difficult choices about your care     Weight Management   Why it is important to manage your weight:  Being overweight increases your risk of health conditions such as heart disease, high blood pressure, type 2 diabetes, and certain types of cancer  It can also increase your risk for osteoarthritis, sleep apnea, and other respiratory problems  Aim for a slow, steady weight loss  Even a small amount of weight loss can lower your risk of health problems  How to lose weight safely:  A safe and healthy way to lose weight is to eat fewer calories and get regular exercise  You can lose up about 1 pound a week by decreasing the number of calories you eat by 500 calories each day  Healthy meal plan for weight management:  A healthy meal plan includes a variety of foods, contains fewer calories, and helps you stay healthy  A healthy meal plan includes the following:  · Eat whole-grain foods more often  A healthy meal plan should contain fiber  Fiber is the part of grains, fruits, and vegetables that is not broken down by your body  Whole-grain foods are healthy and provide extra fiber in your diet  Some examples of whole-grain foods are whole-wheat breads and pastas, oatmeal, brown rice, and bulgur  · Eat a variety of vegetables every day  Include dark, leafy greens such as spinach, kale, cintia greens, and mustard greens  Eat yellow and orange vegetables such as carrots, sweet potatoes, and winter squash  · Eat a variety of fruits every day  Choose fresh or canned fruit (canned in its own juice or light syrup) instead of juice  Fruit juice has very little or no fiber  · Eat low-fat dairy foods  Drink fat-free (skim) milk or 1% milk  Eat fat-free yogurt and low-fat cottage cheese  Try low-fat cheeses such as mozzarella and other reduced-fat cheeses  · Choose meat and other protein foods that are low in fat  Choose beans or other legumes such as split peas or lentils  Choose fish, skinless poultry (chicken or turkey), or lean cuts of red meat (beef or pork)  Before you cook meat or poultry, cut off any visible fat     · Use less fat and oil  Try baking foods instead of frying them  Add less fat, such as margarine, sour cream, regular salad dressing and mayonnaise to foods  Eat fewer high-fat foods  Some examples of high-fat foods include french fries, doughnuts, ice cream, and cakes  · Eat fewer sweets  Limit foods and drinks that are high in sugar  This includes candy, cookies, regular soda, and sweetened drinks  Exercise:  Exercise at least 30 minutes per day on most days of the week  Some examples of exercise include walking, biking, dancing, and swimming  You can also fit in more physical activity by taking the stairs instead of the elevator or parking farther away from stores  Ask your healthcare provider about the best exercise plan for you  © Copyright [employer] 2018 Information is for End User's use only and may not be sold, redistributed or otherwise used for commercial purposes  All illustrations and images included in CareNotes® are the copyrighted property of [employer] A M , Inc  or [address] [place] [address]    Please complete a living will and return it to the office at your next visit or you can drop it off at your earliest convenience

## 2021-03-04 NOTE — PROGRESS NOTES
Assessment and Plan:     Problem List Items Addressed This Visit        Other    Medicare annual wellness visit, subsequent - Primary      Other Visit Diagnoses     Encounter for immunization        Screening for cervical cancer               Preventive health issues were discussed with patient, and age appropriate screening tests were ordered as noted in patient's After Visit Summary  Personalized health advice and appropriate referrals for health education or preventive services given if needed, as noted in patient's After Visit Summary       History of Present Illness:     Patient presents for Medicare Annual Wellness visit    Patient Care Team:  Asia Blue DO as PCP - General Morgan Shea DO     Problem List:     Patient Active Problem List   Diagnosis    Anxiety    [facility]    Acute hepatitis C virus infection    Hypothyroidism    Fatigue    Wellness examination    Actinic otitis externa    Encounter for screening mammogram for breast cancer    Encounter for gynecological examination without abnormal finding    Screening for cardiovascular condition    Class 1 obesity due to excess calories without serious comorbidity in adult    Medicare annual wellness visit, subsequent    Postoperative visit    Class 1 obesity due to excess calories without serious comorbidity with body mass index (BMI) of 33 0 to 33 9 in adult    Depression    Eczema    Family history of breast cancer    Acute folliculitis    Rash    Iron deficiency    Toe injury    Primary insomnia    Non-seasonal allergic rhinitis due to pollen    Upper respiratory tract infection      Past Medical and Surgical History:     Past Medical History:   Diagnosis Date    Anxiety     Bipolar disorder ([facility]     Depression     Disease of thyroid gland     Gallbladder calculus     Infectious viral hepatitis 2009    Hep C, treated    Wears glasses      Past Surgical History:   Procedure Laterality Date    LIVER BIOPSY      Last assessed: 7/1/15    SD LAP,CHOLECYSTECTOMY N/A 7/17/2019    Procedure: Chandrika Gutiérrez;  Surgeon: Francisco Bruner MD;  Location: [facility];  Service: General    TUBAL LIGATION        Family History:     Family History   Problem Relation Age of Onset    Breast cancer Mother     Alcohol abuse Father         in recovery    Bipolar disorder Father         current episode depressed, current episode severity unspecified    COPD Father         w/acute bronchitis    Depression Father     Depression Sister     No Known Problems Daughter     Breast cancer Maternal Grandmother     No Known Problems Sister     No Known Problems Maternal Aunt     No Known Problems Paternal Aunt     No Known Problems Cousin       Social History:        Social History     Socioeconomic History    Marital status: Single     Spouse name: None    Number of children: None    Years of education: None    Highest education level: None   Occupational History    None   Social Needs    Financial resource strain: None    Food insecurity     Worry: None     Inability: None    Transportation needs     Medical: None     Non-medical: None   Tobacco Use    Smoking status: Never Smoker    Smokeless tobacco: Never Used   Substance and Sexual Activity    Alcohol use: Yes     Frequency: 4 or more times a week     Drinks per session: 3 or 4     Binge frequency: Weekly     Comment: 2-3 times per week has 6+ vodka shots    Drug use: Not Currently     Comment: 1 year of cocaine use 25 years ago, none now    Sexual activity: Yes   Lifestyle    Physical activity     Days per week: None     Minutes per session: None    Stress: None   Relationships    Social connections     Talks on phone: None     Gets together: None     Attends Cheondoism service: None     Active member of club or organization: None     Attends meetings of clubs or organizations: None     Relationship status: None    Intimate partner violence     Fear of current or ex partner: None     Emotionally abused: None     Physically abused: None     Forced sexual activity: None   Other Topics Concern    None   Social History Narrative    Bereavement      Medications and Allergies:     Current Outpatient Medications   Medication Sig Dispense Refill    Cholecalciferol (VITAMIN D3) 1000 units CAPS Take by mouth      clindamycin (CLEOCIN) 2 % vaginal cream       clonazePAM (KlonoPIN) 1 mg tablet Take 1 mg by mouth      clotrimazole-betamethasone (LOTRISONE) 1-0 05 % cream Apply topically 2 (two) times a day 30 g 0    hydrocortisone-acetic acid (VOSOL-HC) otic solution Administer 3 drops into both ears 4 (four) times a day 10 mL 3    levothyroxine 75 mcg tablet Take 1 tablet (75 mcg total) by mouth daily 90 tablet 1    metroNIDAZOLE (METROGEL) 0 75 % vaginal gel       montelukast (SINGULAIR) 10 mg tablet Take 1 tablet (10 mg total) by mouth daily at bedtime 90 tablet 1    nystatin (MYCOSTATIN) cream Apply 1 application topically 2 (two) times a day To affected area      oxybutynin (DITROPAN-XL) 5 mg 24 hr tablet Take 5 mg by mouth daily      risperiDONE (RisperDAL) 4 mg tablet Take 1 5 tablets by mouth daily Take 1 5 tablets each evening      sucralfate (CARAFATE) 1 g/10 mL suspension Take 10 mL (1 g total) by mouth 4 (four) times a day 420 mL 0    triamcinolone (KENALOG) 0 1 % cream Apply 1 application topically 2 (two) times a day To affected area      trihexyphenidyl (ARTANE) 5 mg tablet Take 10 mg by mouth      VIIBRYD 20 MG tablet Take 20 mg by mouth daily  2     No current facility-administered medications for this visit        Allergies   Allergen Reactions    Citalopram Hives     Reaction Date: 27Feb2012;     Lamotrigine Hives      Immunizations:     Immunization History   Administered Date(s) Administered    Hep A / Hep B 12/05/2008, 01/12/2009, 06/15/2009    Hep A, adult 12/05/2008, 01/12/2009, 06/15/2009    Hep B, adult 12/05/2008, 01/12/2009, 06/15/2009    INFLUENZA 11/11/2008, 10/15/2013, 09/17/2014, 10/01/2015, 08/27/2018, 08/26/2020    Influenza Quadrivalent Preservative Free 3 years and older IM 09/16/2016, 09/07/2017    Influenza, injectable, quadrivalent, preservative free 0 5 mL 08/17/2019, 08/26/2020    Tdap 05/29/2018      Health Maintenance:         Topic Date Due    MAMMOGRAM  06/27/2020    Cervical Cancer Screening  10/21/2020    HIV Screening  Completed    Hepatitis C Screening  Discontinued         Topic Date Due    Pneumococcal Vaccine: Pediatrics (0 to 5 Years) and At-Risk Patients (6 to 59 Years) (1 of 1 - PPSV23) [birth year]1977      Medicare Health Risk Assessment:     There were no vitals taken for this visit  Raymond Vera is here for her Subsequent Wellness visit  Health Risk Assessment:   Patient rates overall health as good  Patient feels that their physical health rating is same  Eyesight was rated as same  Hearing was rated as same  Patient feels that their emotional and mental health rating is same  Pain experienced in the last 7 days has been none  Patient states that she has experienced no weight loss or gain in last 6 months  Fall Risk Screening: In the past year, patient has experienced: no history of falling in past year      Urinary Incontinence Screening:   Patient has not leaked urine accidently in the last six months  Home Safety:  Patient does not have trouble with stairs inside or outside of their home  Patient has working smoke alarms and has working carbon monoxide detector  Home safety hazards include: none  Nutrition:   Current diet is Regular  Medications:   Patient is currently taking over-the-counter supplements  OTC medications include: see medication list  Patient is able to manage medications       Activities of Daily Living (ADLs)/Instrumental Activities of Daily Living (IADLs):   Walk and transfer into and out of bed and chair?: Yes  Dress and groom yourself?: Yes    Bathe or shower yourself?: Yes    Feed yourself?  Yes  Do your laundry/housekeeping?: Yes  Manage your money, pay your bills and track your expenses?: Yes  Make your own meals?: Yes    Do your own shopping?: Yes    Previous Hospitalizations:   Any hospitalizations or ED visits within the last 12 months?: No      Advance Care Planning:   Living will: No    Durable POA for healthcare: No    Advanced directive: No      Cognitive Screening:   Provider or family/friend/caregiver concerned regarding cognition?: No    PREVENTIVE SCREENINGS      Cardiovascular Screening:    General: Screening Current      Diabetes Screening:     General: Screening Current      Breast Cancer Screening:     General: Screening Current      Lung Cancer Screening:     General: Screening Not Indicated      Hepatitis C Screening:    General: Screening Not Indicated and History Hepatitis C      Leona Velez MD

## 2021-03-04 NOTE — PROGRESS NOTES
INTERNAL MEDICINE FOLLOW-UP OFFICE VISIT  [facility]    NAME: Andressa Patino  AGE: 53 y o  SEX: female  : 1970     DATE: 3/4/2021     Assessment and Plan:       Hypothyroidism  Lab Results   Component Value Date    [id number] 3 270 2020     · Patient is currently on Levothyroxine 75 mcg qAM and doing well  Plan:  · Continue Levothyroxine 75 mcg qAM  · Repeat TSH and T4   · Follow up in 4 months  Non-seasonal allergic rhinitis due to pollen  · She admits to still having dryness and itching in her ears bilaterally associated with runny nose, itchy and watery eyes  She would like her ear drops refilled if possible  · Currently on singulair 10 mg qd  Plan:  · Continue singulair 10 mg qd  · Try OTC Claritin 10 qd  · Follow up in 4 months    Class 2 drug-induced obesity without serious comorbidity with body mass index (BMI) of 36 0 to 36 9 in adult  Body mass index is 36 14 kg/m²  Plan:   Recommend incorporating a more whole foods plant-predominant diet along with decreasing consumption of red meats and processed foods   Per CDC guidelines, recommend moderate-vigorous intensity exercise for 30 minutes a day for 5 days a week or a total of 150 min/week      Depression  · Patient has been on Risperdal and Artane 5 mg and is followed by Marcus Sneed  · Last ECG 10/2019 showed NSR with QTc of 409  · Her Viibryd was discontinued 2018 for lack of improvement on the medication  · Patient states she is doing well off the Viibryd  · POCT ECG today showed NSR with HR 70 and Qtc of 423    Plan:  · Continue Risperdal and Artane  · F/U with Psychiatry prn  · Continue with walking and dgood dietary habits  · F/U in 4 months    Acute hepatitis C virus infection  · Patient stated that her boyfriend id Hep C positive and she was Dx and treated in  with interferon which was noted to be ineffective for her   She was to f/u with GI  Dr Maya Lloyd treatment with sofosbuvir but no records indicate that she followed up  Patient would like to be tested again to see if she still has the virus  Plan:  · Follow up with GI prn  Return in 4 months (on 7/4/2021)  Chief Complaint:     Medicare Annual Visit and Chronic Condition Follow up  History of Present Illness:     Alisha Hassan is a 56year old female with PMH of hypothyroidism, Depression, Anxiety, obesity, Eczema, Actinic Otitis Externa, Over active bladder and Hep C treated who presents to the office for Medicare annual visit and to address her chronic problems  She would like her hypothyroidism medication refilled  She denies increased fatigue, palpitations, constipation, diarrhea, hair changes or restlessness  She admits to still having dryness and itching in her ears bilaterally associated with runny nose, itchy and watery eyes  She would like her ear drops refilled if possible  Patient has been on Risperdal and Artane 5 mg  And is followed by Marcus Sneed  last ECG 10/2019 showed NSR with QTc of 409  Her Viibryd was discontinued by Psychiatrist office for lack of improvement on the medication  Patient stated that her boyfriend id Hep C positive and she was Dx and treated in 2009 with interferon which was noted to be ineffective for her  She was to f/u with GI  Dr Moo Reynoso for treatment with sofosbuvir but no records indicate that she followed up  Patient would like to be tested again to see if she still has the virus  Patient would also like to lose weight and stated that she has started walking at least twice a week for 20-30 min when the weather permits  She has cut down on her alcohol intake to 1 glass of wine per week      The following portions of the patient's history were reviewed and updated as appropriate: allergies, current medications, past family history, past medical history, past social history, past surgical history and problem list      Review of Systems:     Review of Systems   Constitutional: Negative for activity change, appetite change, chills, fatigue, fever and unexpected weight change  HENT: Negative for congestion  Eyes: Negative for visual disturbance  Respiratory: Negative for shortness of breath  Cardiovascular: Negative for chest pain and palpitations  Gastrointestinal: Negative for abdominal pain, constipation, diarrhea, nausea and vomiting  Genitourinary: Negative for difficulty urinating  Musculoskeletal: Negative for arthralgias, joint swelling, myalgias and neck pain  Neurological: Negative for dizziness, weakness, numbness and headaches  Hematological: Does not bruise/bleed easily  Psychiatric/Behavioral: Negative for agitation, behavioral problems and confusion  Problem List:     Patient Active Problem List   Diagnosis    Anxiety    [facility] ([facility]    Acute hepatitis C virus infection    Hypothyroidism    Fatigue    Wellness examination    Actinic otitis externa    Encounter for screening mammogram for breast cancer    Encounter for gynecological examination without abnormal finding    Screening for cardiovascular condition    Class 1 obesity due to excess calories without serious comorbidity in adult    Medicare annual wellness visit, subsequent    Postoperative visit    Class 2 drug-induced obesity without serious comorbidity with body mass index (BMI) of 36 0 to 36 9 in adult    Depression    Eczema    Family history of breast cancer    Acute folliculitis    Rash    Iron deficiency    Toe injury    Primary insomnia    Non-seasonal allergic rhinitis due to pollen    Upper respiratory tract infection        Objective:     /70   Pulse 82   Resp 16   Ht 5' 2" (1 575 m)   Wt 89 6 kg (197 lb 9 6 oz)   SpO2 98%   BMI 36 14 kg/m²     Physical Exam  Vitals signs and nursing note reviewed     Constitutional: General: She is not in acute distress  Appearance: Normal appearance  She is not ill-appearing, toxic-appearing or diaphoretic  HENT:      Head: Normocephalic and atraumatic  Right Ear: Ear canal and external ear normal  Tympanic membrane is retracted  Left Ear: Tympanic membrane, ear canal and external ear normal       Ears:      Comments: Dried non-occluding cerumen adjacent to Left TM     Nose: Nose normal       Mouth/Throat:      Mouth: Mucous membranes are moist       Pharynx: Oropharynx is clear  Eyes:      General: No scleral icterus  Right eye: No discharge  Left eye: No discharge  Extraocular Movements: Extraocular movements intact  Pupils: Pupils are equal, round, and reactive to light  Neck:      Musculoskeletal: Normal range of motion and neck supple  No neck rigidity or muscular tenderness  Vascular: No carotid bruit  Cardiovascular:      Rate and Rhythm: Normal rate and regular rhythm  Pulses: Normal pulses  Heart sounds: Normal heart sounds  No murmur  No friction rub  No gallop  Pulmonary:      Effort: Pulmonary effort is normal       Breath sounds: Normal breath sounds  No wheezing, rhonchi or rales  Abdominal:      General: Bowel sounds are normal       Palpations: Abdomen is soft  Tenderness: There is no abdominal tenderness  There is no guarding or rebound  Musculoskeletal:         General: No swelling or tenderness  Right lower leg: No edema  Left lower leg: No edema  Skin:     Capillary Refill: Capillary refill takes less than 2 seconds  Coloration: Skin is not jaundiced  Findings: No bruising, erythema, lesion or rash  Neurological:      General: No focal deficit present  Mental Status: She is alert and oriented to person, place, and time  Sensory: No sensory deficit  Motor: No weakness        Gait: Gait normal    Psychiatric:         Mood and Affect: Mood normal          Behavior: Behavior normal          Thought Content: Thought content normal          Judgment: Judgment normal          Pertinent Laboratory/Diagnostic Studies:    Laboratory Results: I have personally reviewed the pertinent laboratory results/reports     CBC:   Results from Last 12 Months   Lab Units 09/12/20  0812   WBC Thousand/uL 8 28   RBC Million/uL 4 81   HEMOGLOBIN g/dL 14 4   HEMATOCRIT % 45 6   MCV fL 95   MCH pg 29 9   MCHC g/dL 31 6   RDW % 12 2   MPV fL 9 6   PLATELETS Thousands/uL 219   NRBC AUTO /100 WBCs 0   NEUTROS PCT % 66   LYMPHS PCT % 24   MONOS PCT % 9   EOS PCT % 1   BASOS PCT % 0   NEUTROS ABS Thousands/µL 5 47   LYMPHS ABS Thousands/µL 1 95   MONOS ABS Thousand/µL 0 70   EOS ABS Thousand/µL 0 12     Chemistry Profile:   Results from Last 12 Months   Lab Units 09/12/20  0812 06/12/20  0710   POTASSIUM mmol/L 5 1 3 9   CHLORIDE mmol/L 106 103   CO2 mmol/L 25 27   BUN mg/dL 14 14   CREATININE mg/dL 0 83 0 75   GLUCOSE FASTING mg/dL  --  79   GLUCOSE RANDOM mg/dL 93  --    CALCIUM mg/dL 8 9 9 5   AST U/L  --  20   ALT U/L  --  36   ALK PHOS U/L  --  141*   EGFR ml/min/1 73sq m 83 94     Endocrine Studies:   Results from Last 12 Months   Lab Units 06/12/20  0710   TSH 3RD GENERATON uIU/mL 3 270   TRIGLYCERIDES mg/dL 62   CHOLESTEROL mg/dL 221*   HDL mg/dL 60   LDL CALC mg/dL 149*   VIT D 25 HYDROXY ng/mL 41 1       Radiology/Other Diagnostic Testing Results: I have personally reviewed pertinent reports        Harish Borrego MD  [facility]

## 2021-03-04 NOTE — ASSESSMENT & PLAN NOTE
· Patient stated that her boyfriend id Hep C positive and she was Dx and treated in 2009 with interferon which was noted to be ineffective for her  She was to f/u with [facility] treatment with sofosbuvir but no records indicate that she followed up  Patient would like to be tested again to see if she still has the virus  Plan:  · Follow up with GI prn

## 2021-03-05 ENCOUNTER — TELEPHONE (OUTPATIENT)
Dept: INTERNAL MEDICINE CLINIC | Facility: CLINIC | Age: 51
End: 2021-03-05

## 2021-03-05 NOTE — TELEPHONE ENCOUNTER
Patient said she is turning 51 this month and would like an order for a colonoscopy  Please enter order and mail to patient  Patient requests a call when it is being mailed

## 2021-03-05 NOTE — TELEPHONE ENCOUNTER
Please have the patient schedule EKG with the nurse in the office next week, she should get over-the-counter Debrox

## 2021-03-11 DIAGNOSIS — Z12.11 SCREENING FOR MALIGNANT NEOPLASM OF COLON: Primary | ICD-10-CM

## 2021-03-11 NOTE — TELEPHONE ENCOUNTER
This request from last week still has not been addressed  Please enter order, mail to patient, and advise patient once sent

## 2021-03-12 ENCOUNTER — HOSPITAL ENCOUNTER (OUTPATIENT)
Dept: MAMMOGRAPHY | Facility: HOSPITAL | Age: 51
Discharge: HOME | End: 2021-03-12
Payer: COMMERCIAL

## 2021-03-12 VITALS — BODY MASS INDEX: 36.25 KG/M2 | WEIGHT: 197 LBS | HEIGHT: 62 IN

## 2021-03-12 DIAGNOSIS — Z12.31 ENCOUNTER FOR SCREENING MAMMOGRAM FOR MALIGNANT NEOPLASM OF BREAST: ICD-10-CM

## 2021-03-12 PROCEDURE — 77067 SCR MAMMO BI INCL CAD: CPT

## 2021-03-12 PROCEDURE — 77063 BREAST TOMOSYNTHESIS BI: CPT

## 2021-03-19 ENCOUNTER — APPOINTMENT (OUTPATIENT)
Dept: LAB | Facility: HOSPITAL | Age: 51
End: 2021-03-19
Payer: COMMERCIAL

## 2021-03-19 DIAGNOSIS — Z13.6 SCREENING FOR CARDIOVASCULAR CONDITION: ICD-10-CM

## 2021-03-19 LAB
CHOLEST SERPL-MCNC: 211 MG/DL (ref 50–200)
HDLC SERPL-MCNC: 59 MG/DL
LDLC SERPL CALC-MCNC: 137 MG/DL (ref 0–100)
NONHDLC SERPL-MCNC: 152 MG/DL
TRIGL SERPL-MCNC: 74 MG/DL

## 2021-03-19 PROCEDURE — 36415 COLL VENOUS BLD VENIPUNCTURE: CPT

## 2021-03-19 PROCEDURE — 80061 LIPID PANEL: CPT

## 2021-03-23 DIAGNOSIS — E03.9 HYPOTHYROIDISM, UNSPECIFIED TYPE: Primary | ICD-10-CM

## 2021-04-02 ENCOUNTER — APPOINTMENT (OUTPATIENT)
Dept: LAB | Facility: HOSPITAL | Age: 51
End: 2021-04-02
Payer: COMMERCIAL

## 2021-04-02 DIAGNOSIS — E87.1 SODIUM (NA) DEFICIENCY: ICD-10-CM

## 2021-04-02 DIAGNOSIS — E03.9 HYPOTHYROIDISM, UNSPECIFIED TYPE: ICD-10-CM

## 2021-04-02 DIAGNOSIS — E83.19 IRON EXCESS: ICD-10-CM

## 2021-04-02 LAB
BASOPHILS # BLD AUTO: 0.04 THOUSANDS/ΜL (ref 0–0.1)
BASOPHILS NFR BLD AUTO: 1 % (ref 0–1)
EOSINOPHIL # BLD AUTO: 0.1 THOUSAND/ΜL (ref 0–0.61)
EOSINOPHIL NFR BLD AUTO: 2 % (ref 0–6)
ERYTHROCYTE [DISTWIDTH] IN BLOOD BY AUTOMATED COUNT: 12.6 % (ref 11.6–15.1)
HCT VFR BLD AUTO: 45.8 % (ref 34.8–46.1)
HGB BLD-MCNC: 15.2 G/DL (ref 11.5–15.4)
IMM GRANULOCYTES # BLD AUTO: 0.02 THOUSAND/UL (ref 0–0.2)
IMM GRANULOCYTES NFR BLD AUTO: 0 % (ref 0–2)
IRON SERPL-MCNC: 69 UG/DL (ref 50–170)
LYMPHOCYTES # BLD AUTO: 2.02 THOUSANDS/ΜL (ref 0.6–4.47)
LYMPHOCYTES NFR BLD AUTO: 37 % (ref 14–44)
MCH RBC QN AUTO: 31.1 PG (ref 26.8–34.3)
MCHC RBC AUTO-ENTMCNC: 33.2 G/DL (ref 31.4–37.4)
MCV RBC AUTO: 94 FL (ref 82–98)
MONOCYTES # BLD AUTO: 0.4 THOUSAND/ΜL (ref 0.17–1.22)
MONOCYTES NFR BLD AUTO: 7 % (ref 4–12)
NEUTROPHILS # BLD AUTO: 2.95 THOUSANDS/ΜL (ref 1.85–7.62)
NEUTS SEG NFR BLD AUTO: 53 % (ref 43–75)
NRBC BLD AUTO-RTO: 0 /100 WBCS
PLATELET # BLD AUTO: 276 THOUSANDS/UL (ref 149–390)
PMV BLD AUTO: 9 FL (ref 8.9–12.7)
RBC # BLD AUTO: 4.88 MILLION/UL (ref 3.81–5.12)
SODIUM SERPL-SCNC: 140 MMOL/L (ref 136–145)
TSH SERPL DL<=0.05 MIU/L-ACNC: 1.89 UIU/ML (ref 0.36–3.74)
WBC # BLD AUTO: 5.53 THOUSAND/UL (ref 4.31–10.16)

## 2021-04-02 PROCEDURE — 84443 ASSAY THYROID STIM HORMONE: CPT

## 2021-04-02 PROCEDURE — 85025 COMPLETE CBC W/AUTO DIFF WBC: CPT

## 2021-04-02 PROCEDURE — 36415 COLL VENOUS BLD VENIPUNCTURE: CPT

## 2021-04-02 PROCEDURE — 83540 ASSAY OF IRON: CPT

## 2021-04-02 PROCEDURE — 84295 ASSAY OF SERUM SODIUM: CPT

## 2021-04-21 ENCOUNTER — TELEPHONE (OUTPATIENT)
Dept: GASTROENTEROLOGY | Facility: CLINIC | Age: 51
End: 2021-04-21

## 2021-05-30 VITALS — WEIGHT: 139 LBS | BODY MASS INDEX: 23.13 KG/M2

## 2021-05-30 VITALS — BODY MASS INDEX: 23.13 KG/M2 | WEIGHT: 139 LBS

## 2021-05-31 VITALS — HEIGHT: 65 IN | BODY MASS INDEX: 23.63 KG/M2 | WEIGHT: 141.8 LBS

## 2021-05-31 VITALS — BODY MASS INDEX: 23.23 KG/M2 | WEIGHT: 140.7 LBS

## 2021-05-31 VITALS — WEIGHT: 138.38 LBS | BODY MASS INDEX: 22.85 KG/M2

## 2021-06-01 VITALS — BODY MASS INDEX: 23.03 KG/M2 | HEIGHT: 65 IN | WEIGHT: 138.25 LBS

## 2021-06-02 VITALS — WEIGHT: 138 LBS | BODY MASS INDEX: 22.96 KG/M2

## 2021-06-02 VITALS — HEIGHT: 65 IN | BODY MASS INDEX: 22.82 KG/M2 | WEIGHT: 137 LBS

## 2021-06-02 VITALS — BODY MASS INDEX: 23.81 KG/M2 | HEIGHT: 65 IN | WEIGHT: 142.9 LBS

## 2021-06-03 NOTE — TELEPHONE ENCOUNTER
Left message for patient notifying her of normal lab results. Encouraged patient to call if questions, otherwise results will be in Mychart for review. Agnieszka Luna, CMA

## 2021-06-03 NOTE — PROGRESS NOTES
[facility] Hematology and Oncology Progress Note    Patient: Corinna Kinney  MRN: [id number]  Date of Service: 11/19/2019        Reason for Visit    Chief Complaint   Patient presents with     HE Cancer     Malignant neoplasm of upper-outer quadrant of right breast in female, estrogen receptor negative       Assessment and Plan    Breast cancer of upper-outer quadrant of left female breast    Primary site: Breast (Right)    Clinical free text: ER-,WV-, [id number] 3+    Clinical: Stage IIB (T2, N1, cM0)    1.CA breast right-sided T2 N1 M0 ER/WV negative HER-2/tiffani 3+, diagnosed in May 2011:  status post neoadjuvant chemotherapy with carboplatin and Taxotere and Herceptin which led to complete pathological response. She had bilateral mastectomy with reconstruction and then also received radiation therapy to her right breast. She has had no evidence of disease recurrence since that time. Continue to follow regularly. She will return in 12 months.      2. Malignant Melanoma Of The Skin    Primary site: Melanoma of the Skin (Right) Back    Clinical: Stage IA (T1a, N0, M0): Pt continue to meet with her dermatologist every 12 months.     3. Hx of brain Meningioma: had yearly MRIs from 2011 until 2013 and it was 0.7cm and stable. No changes. Dr. Massey does not think we need to continue any regular screening of this.       ECOG Performance   ECOG Performance Status: 0    Distress Assessment  Distress Assessment Score: 7(work related): no need for any assistance from us at this point. Encourage her to find good stress relief and to do self care.     Pain  Currently in Pain: No/denies      Problem List    1. Malignant neoplasm of upper-outer quadrant of right breast in female, estrogen receptor negative (H)  Comprehensive Metabolic Panel    LD(LDH)    Comprehensive Metabolic Panel    LD(LDH)      ______________________________________________________________________________    History of Present Illness    Patient is a very  pleasant 46-year-old woman who comes to me to establish care for her breast cancer and melanoma. SHe was diagnosed with stage II breast cancer, located on her right breast presenting with the yellowish nipple discharge and workup revealed that she had a mass measuring over 2 cm in size with right axillary lymphadenopathy. She underwent biopsy of breast mass as well as a lymph node which showed high-grade invasive ductal carcinoma ER/FL negative HER-2/tiffani 3+. She was treated with neoadjuvant carboplatin and Taxotere and Herceptin which resulted in complete pathological response. She then underwent bilateral mastectomy with reconstruction. She was also treated with radiation therapy to the right breast. She went on to finish one year of Herceptin.     In October 2012 she was diagnosed to have melanoma located on her back which was 0.75 mm deep Deyvi's level IV with negative lymph nodes on reexcision. She has been followed up with observation for that. She did have a PET scan after the melanoma was diagnosed and that was negative.    Pt states she is feeling well and really has no complaints today.     Pain Status  Currently in Pain: No/denies    Review of Systems    Constitutional  Constitutional (WDL): All constitutional elements are within defined limits  Neurosensory  Neurosensory (WDL): All neurosensory elements are within defined limits  Eye   Eye Disorder (WDL): Exceptions to WDL(redness in eyes intermittently)  Ear  Ear Disorder (WDL): All ear disorder elements are within defined limits  Cardiovascular  Cardiovascular (WDL): Exceptions to WDL  Chest Pain - Cardiac: Concerns(had work up; pcp said it was stress induced)  Pulmonary  Respiratory (WDL): Within Defined Limits  Gastrointestinal  Gastrointestinal (WDL): All gastrointestinal elements are within defined limits  Genitourinary  Genitourinary (WDL): All genitourinary elements are within defined limits  Lymphatic  Lymph (WDL): All lymph disorder elements are  "within defined limits  Musculoskeletal and Connective Tissue  Musculoskeletal and Connetive Tissue Disorders (WDL): All Musculoskeletal and Connetive Tissue Disorder elements are within defined limits  Integumentary  Integumentary (WDL): All integumentary elements are within defined limits  Patient Coping  Patient Coping: Accepting;Open/discussion  Accompanied by  Accompanied by: Alone  Oral Chemo Adherence         Past History  Past Medical History:   Diagnosis Date     Backache     Created by Conversion      Benign Pigmented Nevus     Created by Conversion      Breast cancer (H)      Breast Neoplasm     Created by Conversion [facility] Annotation: Oct 21 2011  2:28PM - Beata Callahan: intraductal,  +HER2 receptors, -ER/PRtreated with carboplatin, taxotere and herceptin      Lentigo     Created by Conversion      Lymphedema     Created by Conversion      Malignant Melanoma Of The Skin     Created by Conversion      Metrorrhagia     Created by Conversion        PHYSICAL EXAM:  /82   Pulse 72   Ht 5' 5\" (1.651 m)   Wt 141 lb (64 kg)   SpO2 97%   BMI 23.46 kg/m    GENERAL: no acute distress. Cooperative in conversation. Here alone   HEENT: pupils are equal, round and reactive. Oromucosa is clean and intact. No ulcerations or mucositis noted. No bleeding noted.  RESP: lungs are clear bilaterally per auscultation. Regular respiratory rate. No wheezes or rhonchi.  CV: Regular, rate and rhythm. No murmurs.  ABD: soft, nontender. Positive bowel sounds. No organomegaly.   MUSCULOSKELETAL: No lower extremity swelling.   NEURO: non focal. Alert and oriented x3.   PSYCH: within normal limits. No depression or anxiety.  SKIN: warm dry intact   LYMPH: no cervical, supraclavicular or axillary lymphadenopathy  BREAST: Breast: bilateral exam done. Bilateral mastectomies with reconstruction/implants. No abnormal rashes or lesion noted. No evidence of local recurrence.          Lab Results    Recent Results (from the " past 168 hour(s))   Comprehensive Metabolic Panel   Result Value Ref Range    Sodium 141 136 - 145 mmol/L    Potassium 4.1 3.5 - 5.0 mmol/L    Chloride 105 98 - 107 mmol/L    CO2 30 22 - 31 mmol/L    Anion Gap, Calculation 6 5 - 18 mmol/L    Glucose 91 70 - 125 mg/dL    BUN 14 8 - 22 mg/dL    Creatinine 0.79 0.60 - 1.10 mg/dL    GFR MDRD Af Amer >60 >60 mL/min/1.73m2    GFR MDRD Non Af Amer >60 >60 mL/min/1.73m2    Bilirubin, Total 0.6 0.0 - 1.0 mg/dL    Calcium 9.7 8.5 - 10.5 mg/dL    Protein, Total 7.4 6.0 - 8.0 g/dL    Albumin 4.4 3.5 - 5.0 g/dL    Alkaline Phosphatase 55 45 - 120 U/L    AST 17 0 - 40 U/L    ALT 22 0 - 45 U/L       Imaging    No results found.      Signed by: Carlota Rosas, CNP

## 2021-06-03 NOTE — PROGRESS NOTES
Patient is here for provider visit for Malignant neoplasm of upper-outer quadrant of right breast in female, estrogen receptor negative.

## 2021-06-04 VITALS
SYSTOLIC BLOOD PRESSURE: 110 MMHG | WEIGHT: 146 LBS | HEIGHT: 65 IN | BODY MASS INDEX: 24.32 KG/M2 | DIASTOLIC BLOOD PRESSURE: 70 MMHG

## 2021-06-04 VITALS
SYSTOLIC BLOOD PRESSURE: 130 MMHG | OXYGEN SATURATION: 97 % | BODY MASS INDEX: 23.49 KG/M2 | DIASTOLIC BLOOD PRESSURE: 82 MMHG | HEART RATE: 72 BPM | HEIGHT: 65 IN | WEIGHT: 141 LBS

## 2021-06-05 VITALS
BODY MASS INDEX: 23.95 KG/M2 | OXYGEN SATURATION: 98 % | DIASTOLIC BLOOD PRESSURE: 73 MMHG | HEART RATE: 89 BPM | TEMPERATURE: 98.6 F | SYSTOLIC BLOOD PRESSURE: 135 MMHG | WEIGHT: 143.9 LBS

## 2021-06-06 NOTE — TELEPHONE ENCOUNTER
Orders being requested: fasting labs   Reason service is needed/diagnosis: annual physical   When are orders needed by: 3/6/20. Appointment scheduled   Where to send Orders: n/a  Okay to leave detailed message?  Yes

## 2021-06-06 NOTE — TELEPHONE ENCOUNTER
Let patient know that I put in standing orders for fasting cholesterol and glucose.    If she is wanting any other labs after we talk during our visit, just know she might need another blood draw.  That is the reason I typically will tell patients that we should get labs after a visit as sometimes patients have other symptoms and we need to do other blood work.    Dr. Toledo

## 2021-06-06 NOTE — TELEPHONE ENCOUNTER
Patient has appointment for lab scheduled for 3/6 and is requesting fasting lab orders placed before physical on 3/3.     Teed up a couple orders- please adjust as needed. Will call to update patient once labs are ordered.

## 2021-06-07 NOTE — TELEPHONE ENCOUNTER
Patient is phoned to reschedule upcoming physical RT COVID. Appt is rescheduled to July. Patient does question recent lab results. Md message from labs in noted in my chart messaging. Patient requests printed letter. Letter is drafted and mailed.

## 2021-06-08 NOTE — PROGRESS NOTES
SUBJECTIVE   Corinna Kinney is a 46 y.o. old female with a past medical history of skin cancer, breast cancer who presented to clinic today for further evaluation of rash. In the right upper thigh inner groin area. Itchy at night. Started a couple days ago. Itchy bumps and red. Denies bleeding or purulent discharge. Has not had anything like this before. Denies vaginal involvement. Denies new medication. Hasn't tried anything OTC. Denies h/o STD, in a monogamous relationship, denies new soap/clothes. She did buy a new comforter but this has been washed before she started using. She doesn't think there's bed bug. No one else having similar symptoms. She denies systemic symptoms such as fever/chills, weight loss, night sweat.       Review of Systems:  A 12 point comprehensive review of systems was negative except as noted in HPI.    Medical History  Active Ambulatory (Non-Hospital) Problems    Diagnosis     Meningioma     Post-mastectomy lymphedema syndrome     Scar condition and fibrosis of skin     Paresthesia of arm     Personal history of breast cancer     Lentigo     Breast cancer of upper-outer quadrant of left female breast     Metrorrhagia     Malignant Melanoma Of The Skin     Backache     Benign Pigmented Nevus     Past Medical History   Diagnosis Date     Backache      Benign Pigmented Nevus      Breast cancer      Breast Neoplasm      Lentigo      Lymphedema      Malignant Melanoma Of The Skin      Metrorrhagia        Surgical History  She  has a past surgical history that includes biopsy of breast, incisional; Knee surgery; and Mastectomy (Bilateral).    Social History  Reviewed, and she  reports that she has never smoked. She has never used smokeless tobacco. She reports that she drinks about 0.6 oz of alcohol per week  She reports that she does not use illicit drugs.    Family History  Reviewed, and family history includes Aneurysm in her maternal grandmother; Asthma in her brother; Colon cancer in  her paternal grandmother; No Medical Problems in her daughter, daughter, daughter, and mother; Rheum arthritis in her father.    Medications  Reviewed and reconciled     Allergies  No Known Allergies      OBJECTIVE  Physical Exam:  Vital signs:   Visit Vitals     /84     Pulse 78     Temp 98.8  F (37.1  C) (Oral)     Resp 14     Wt 139 lb (63 kg)     LMP 01/01/2017 (Approximate)     BMI 23.13 kg/m2       Weight: 139 lb (63 kg)    General appearance: alert, appears stated age, cooperative and in no distress  Skin: discreet erythematous pruritic appearing raised maculopapular lesions < 1mm in diameter near right groin, excoriation marks, non-tender to palpation, no scales, not pustular or vesicular in appearance, rest of skin normal, no vaginal involvement.        ASSESSMENT/ PLAN    1. Bug bite  Most likely the cause of these lesions. Do not appear to be fungal infection, scabies, folliculitis, STDs. No systemic symptoms. Will give steroid cream for treatment. Return to clinic if not improving in 1-2 weeks. Patient verbalized understanding.       Fabby Vences MD  [employer] Physician

## 2021-06-09 NOTE — PROGRESS NOTES
[facility] Cancer Care Progress Note    Patient: Corinna Kinney  MRN: [id number]  Date of Service: 2/23/2017        Reason for visit      1. Breast cancer of upper-outer quadrant of left female breast        Assessment     1. CA breast right-sided T2 N1 M0 ER/NE negative HER-2/tiffani 3+ status post neoadjuvant chemotherapy with carboplatin and Taxotere and Herceptin which led to complete pathological response. She had bilateral mastectomy with reconstruction and then also received radiation therapy to her right breast.  This was in 2011.  2. Stage IA malignant melanoma from her right side of her back removed in 2012.  3. Negative genetic testing.  4. Good general health otherwise.   5.  Having some rash in her groin area bilaterally.      Plan     1.  At this time follow-up in 6 months with a repeat chest x-ray.  2.  Follow-up with a dermatologist for full body dermatological checkup.  3.  Continue good diet and exercise.  4.  Topical treatment for the rash.    Clinical stage      Breast cancer of upper-outer quadrant of left female breast    Staging form: Breast, AJCC 7th Edition      Clinical stage from 6/22/2011: Stage IIB (T2, N1, cM0, Free text: ER-,NE-, [id number] 3+) - Signed by iNcolas Massey MD on 1/25/2016      Pathologic: No stage assigned - Unsigned  Malignant Melanoma Of The Skin    Staging form: Melanoma of the Skin, AJCC 7th Edition      Clinical: Stage IA (T1a, N0, M0) - Signed by Nicolas Massey MD on 1/25/2016      Pathologic: No stage assigned - Unsigned      History     Corinna Kinney is a very pleasant 46 y.o. old female with a history of stage II invasive ductal carcinoma diagnosed in October 2011 located on her right breast presenting with the yellowish nipple discharge and workup revealed that she had a mass measuring over 2 cm in size with right axillary lymphadenopathy. She underwent biopsy of pelvic mass as well as a lymph node which showed high-grade invasive ductal carcinoma ER/NE  negative HER-2/tiffani 3+. She was treated with neoadjuvant carboplatin and Taxotere and Herceptin which resulted in complete pathological response. She then underwent bilateral mastectomy with reconstruction. She was also treated with radiation therapy to the right breast. She went on to finish one year of Herceptin. In October 2012 she was diagnosed to have melanoma located on her back which was 0.75 mm deep Deyvi's level IV with negative lymph nodes on reexcision. She has been followed up with observation for that. She did have a PET scan after the melanoma was diagnosed and that was negative.    She continues to follow with the dermatologist.  Gets a full body checkup at least once a year.  Comes in today for scheduled follow-up.  Her only complaint is some rash in her groin area.    Past Medical History     Past Medical History:   Diagnosis Date     Backache     Created by Conversion      Benign Pigmented Nevus     Created by Conversion      Breast cancer      Breast Neoplasm     Created by Conversion [facility] Annotation: Oct 21 2011  2:28PM - Beata Callahan: intraductal,  +HER2 receptors, -ER/PRtreated with carboplatin, taxotere and herceptin      Lentigo     Created by Conversion      Lymphedema     Created by Conversion      Malignant Melanoma Of The Skin     Created by Conversion      Metrorrhagia     Created by Conversion            Review of Systems   Constitutional  Constitutional (WDL): All constitutional elements are within defined limits  Neurosensory  Neurosensory (WDL): All neurosensory elements are within defined limits  Cardiovascular  Cardiovascular (WDL): Exceptions to WDL  Edema: Yes  Edema Limbs: 5 - 10% inter-limb discrepancy in volume or circumference at point of greatest visible difference, swelling or obscuration of anatomic architecture on close inspection (rt arm)  Pulmonary  Respiratory (WDL): Within Defined Limits  Gastrointestinal  Gastrointestinal (WDL): All gastrointestinal elements  are within defined limits  Genitourinary  Genitourinary (WDL): All genitourinary elements are within defined limits  Integumentary  Integumentary (WDL): Exceptions to WDL  Rash Maculo-Papular: Macules/papules covering <10% BSA with or without symptoms (e.g., pruritus, burning, tightness) (rt inner leg rash)  Pruritus: Mild or localized, topical intervention indicated  Patient Coping  Patient Coping: Accepting  Accompanied by  Accompanied by: Alone    ECOG performance status and Distress Assessment      ECOG Performance: 1   ECOG Performance Status: 0    Distress Assessment  Distress Assessment Score: 3:     Pain Status  Currently in Pain: No/denies        Vital Signs     Vitals:    02/23/17 1509   BP: 124/75   Pulse: 74   Temp: 98.3  F (36.8  C)   SpO2: 99%       Physical Exam     GENERAL: No acute distress. Cooperative in conversation.   HEENT: Pupils are equal, round and reactive. Oral mucosa is clean and intact. No ulcerations or mucositis noted. No bleeding noted.  RESP:Chest symmetric lungs are clear bilaterally per auscultation. Regular respiratory rate. No wheezes or rhonchi.  CV: Normal S1 S2 Regular, rate and rhythm. No murmurs.  ABD: Nondistended, soft, nontender. Positive bowel sounds. No organomegaly.   EXTREMITIES: No lower extremity edema.   NEURO: Non- focal. Alert and oriented x3.  Cranial nerves appear intact.  PSYCH: Within normal limits. No depression or anxiety.  SKIN: Warm dry intact.    LYMPH NODES: Bilateral cervical, supraclavicular, axillary lymph node examination was done.  Negative for any palpable adenopathy.      Lab Results     Results for orders placed or performed in visit on 02/23/17   Comprehensive Metabolic Panel   Result Value Ref Range    Sodium 141 136 - 145 mmol/L    Potassium 4.4 3.5 - 5.0 mmol/L    Chloride 104 98 - 107 mmol/L    CO2 27 22 - 31 mmol/L    Anion Gap, Calculation 10 5 - 18 mmol/L    Glucose 91 70 - 125 mg/dL    BUN 14 8 - 22 mg/dL    Creatinine 0.76 0.60 - 1.10  mg/dL    GFR MDRD Af Amer >60 >60 mL/min/1.73m2    GFR MDRD Non Af Amer >60 >60 mL/min/1.73m2    Bilirubin, Total 0.6 0.0 - 1.0 mg/dL    Calcium 9.7 8.5 - 10.5 mg/dL    Protein, Total 7.9 6.0 - 8.0 g/dL    Albumin 4.7 3.5 - 5.0 g/dL    Alkaline Phosphatase 52 45 - 120 U/L    AST 17 0 - 40 U/L    ALT 22 0 - 45 U/L   HM1 (CBC with Diff)   Result Value Ref Range    WBC 7.7 4.0 - 11.0 thou/uL    RBC 5.19 3.80 - 5.40 mill/uL    Hemoglobin 15.5 12.0 - 16.0 g/dL    Hematocrit 46.1 35.0 - 47.0 %    MCV 89 80 - 100 fL    MCH 29.9 27.0 - 34.0 pg    MCHC 33.7 32.0 - 36.0 g/dL    RDW 13.2 11.0 - 14.5 %    Platelets 296 140 - 440 thou/uL    MPV 6.9 (L) 7.0 - 10.0 fL    Neutrophils % 71 (H) 50 - 70 %    Lymphocytes % 19 (L) 20 - 40 %    Monocytes % 6 2 - 10 %    Eosinophils % 3 0 - 6 %    Basophils % 1 0 - 2 %    Neutrophils Absolute 5.5 2.0 - 7.7 thou/uL    Lymphocytes Absolute 1.5 0.8 - 4.4 thou/uL    Monocytes Absolute 0.5 0.0 - 0.9 thou/uL    Eosinophils Absolute 0.2 0.0 - 0.4 thou/uL    Basophils Absolute 0.0 0.0 - 0.2 thou/uL         Imaging Results     No results found.      Nicolas Massey MD

## 2021-06-09 NOTE — PROGRESS NOTES
FEMALE PREVENTATIVE EXAM    Assessment and Plan:       1. Encounter for general adult medical examination without abnormal findings    2. Herpetic lesion  Recurrent lesions on lower back. She would like refill to have on hand.  - valACYclovir (VALTREX) 1000 MG tablet; Take 1 tablet (1000mg) twice a day for 3 days at onset of rash.  Dispense: 6 tablet; Refill: 2    3. Abnormal uterine bleeding  She is having really heavy periods. Will get ultrasound to ensure no endometrial cancer.  - US Pelvis Non OB; Future    4. Screening for colon cancer  - Ambulatory referral for Colonoscopy     Next follow up:  Return for Annual physical.    Immunization Review  Adult Imm Review: No immunizations due today      I discussed the following with the patient:   Adult Healthy Living: Importance of regular exercise  Healthy nutrition    Alvin Luciano MD  Internal Medicine and Pediatrics  [facility]  Pager [phone]     Subjective:   Chief Complaint: Corinna Kinney is an 50 y.o. female here for a preventative health visit.     HPI:      Has history of breast cancer s/p mastectomy. Follows with oncology.    Hx of mengioma, no longer needing further surveillance.    She sometimes gets herpetic lesions on her lower back. Has valacyclovir which she takes for 3 days at first onset of rash.    Has heavy periods.     Healthy Habits  Are you taking a daily aspirin? no  Do you typically exercising at least 40 min, 3-4 times per week?  Yes  Do you usually eat at least 4 servings of fruit and vegetables a day, include whole grains and fiber and avoid regularly eating high fat foods? Yes  Have you had an eye exam in the past two years? Yes  Do you see a dentist twice per year? Yes  Do you have any concerns regarding sleep? No    Safety Screen  If you own firearms, are they secured in a locked gun cabinet or with trigger locks? The patient does not own any firearms  Do you feel you are safe where you are living?:  "Yes (7/22/2020 10:53 AM)  Do you feel you are safe in your relationship(s)?: Yes (7/22/2020 10:53 AM)      Review of Systems:  Please see above.  The rest of the review of systems are negative for all systems.     Pap History:   No - age 30-65 PAP every 3 years recommended  Cancer Screening       Status Date      PAP SMEAR Next Due 5/18/2023      Done 5/18/2018 GYNECOLOGIC CYTOLOGY (PAP SMEAR)     Patient has more history with this topic...          Patient Care Team:  Karin Henry MD as PCP - General (Family Medicine)  Shakira Whitt RN as Registered Nurse  Nicolas Massey MD as Physician (Hematology and Oncology)  Alvin Luciano MD as Assigned PCP        History     Reviewed By Date/Time Sections Reviewed    Kayleen Zimmerman CMA 7/22/2020 10:51 AM Tobacco            Objective:   Vital Signs:   Visit Vitals  /70 (Patient Site: Right Arm, Patient Position: Sitting, Cuff Size: Adult Regular)   Ht 5' 5\" (1.651 m)   Wt 146 lb (66.2 kg)   LMP 07/01/2020 (Approximate)   BMI 24.30 kg/m           PHYSICAL EXAM  /70 (Patient Site: Right Arm, Patient Position: Sitting, Cuff Size: Adult Regular)   Ht 5' 5\" (1.651 m)   Wt 146 lb (66.2 kg)   LMP 07/01/2020 (Approximate)   BMI 24.30 kg/m      General Appearance:    Alert, cooperative, no distress, appears stated age   Head:    Normocephalic, without obvious abnormality, atraumatic   Eyes:    PERRL, conjunctiva/corneas clear, EOM's intact   Ears:    Normal TM's and external ear canals, both ears   Nose:   Nares normal, septum midline, mucosa normal, no drainage     or sinus tenderness   Throat:   Lips, mucosa, and tongue normal; teeth and gums normal   Neck:   Supple, symmetrical, trachea midline, no adenopathy   Lungs:     Clear to auscultation bilaterally, respirations unlabored   Chest Wall:    No tenderness or deformity    Heart:    Regular rate and rhythm, S1 and S2 normal, no murmur, rub    or gallop   Abdomen:     Soft, " non-tender, bowel sounds active all four quadrants,     no masses, no organomegaly   Extremities:   Extremities normal, atraumatic, no cyanosis or edema   Skin:   Skin color, texture, turgor normal   Neurologic:   CNII-XII intact, normal strength, sensation            Additional Screenings Completed Today:

## 2021-06-11 NOTE — PROGRESS NOTES
"SUBJECTIVE:   Chief Complaint   Patient presents with     Abdominal Pain     c/o cramping, bloating, gassy, pain on and off for 1 month     Corinna Kinney 47 y.o. female    Current Outpatient Prescriptions   Medication Sig Dispense Refill     cholecalciferol, vitamin D3, 1,000 unit tablet Take 1,000 Units by mouth daily.       No current facility-administered medications for this visit.      Allergies: Review of patient's allergies indicates no known allergies.   Patient's last menstrual period was 06/11/2017.    HPI:   Pleasant 47-year-old with a history of breast cancer and melanoma presents for recurrent episodes of abdominal cramping, bloating, gassiness, and burping.  This has occurred off and on over the last month, multiple episodes.  Prior to that, she recalls one episode in late April that lasted several days.  When she gets these episodes, states pants feel tight and uncomfortable, needs to lay down, sometimes feels somewhat nauseous, but no vomiting.  Tends to have loose stools at baseline, no significant change in bowel movements.  Has not identified any obvious triggers or foods that seem to trigger it.  Has never had colonoscopy.  Menses have been overall somewhat irregular since chemotherapy several years ago, but over the last year she was having monthly periods until this last month where she skipped..  Currently menstruating, states.  Is normal.  Denies heartburn.  Normal urination.    ROS: as per HPI    OBJECTIVE:   /72 (Patient Site: Left Arm, Patient Position: Sitting, Cuff Size: Adult Regular)  Pulse 100  Temp 98.4  F (36.9  C) (Oral)   Resp 16  Ht 5' 5.25\" (1.657 m)  Wt 141 lb 12.8 oz (64.3 kg)  LMP 06/11/2017  BMI 23.42 kg/m2    General: Pleasant, well-appearing, in no acute distress.  HEENT: Pupils equal, round, reactive to light.  Oropharynx clear with moist mucous membranes.   Neck supple without lymphadenopathy.  Cardiovascular: Heart regular rate and rhythm, normal S1-S2, " no murmurs, rubs, or gallops  Respiratory: Lungs are clear to auscultation bilaterally without wheezes or crackles.  Good air movement throughout.  No increased work of breathing.  Abdomen: Soft, nontender, nondistended.  No guarding or rebound.  Extremities: Warm and well perfused without edema  Skin: No jaundice or pallor.      ASSESSMENT/PLAN  1. Abdominal pain, bloating, increased gas  Especially in light of her history of breast cancer at a young age, melanoma, recommend further workup.  Start with CT abdomen and pelvis, which is ordered today.  If that is unremarkable, proceed with pelvic ultrasound.  If both of those imaging studies are unremarkable, recommend gastroenterology consult.  She expressed understanding and agreement.  - CT Abdomen Pelvis With Oral With IV Contrast; Future

## 2021-06-11 NOTE — PROGRESS NOTES
Assessment & Plan:  1. Bloating  As labs have not been checked in a while we will check metabolic panel as well as H pylori today to rule this out.  Patient was encouraged to make an appointment with [facility], as she can always cancel if there are findings on ultrasound or if symptoms would resolve.  In her conversation with Dr. Henry this had been her next step so she was encouraged to do this today and I will place referral as needed.  In the meantime patient is wondering what she can use symptomatically for relief.  I encouraged her in use of as needed simethicone, Pepto-Bismol.  She can also use omeprazole for couple weeks and see if this helps.  Given that she has had a more recent epigastric pain and occasional kind of burning sensation in the upper part of the abdomen we can give this a try.  Medication was sent to the pharmacy for patient to take for 2 weeks.  She can see what sort of response she gets from this.  Will await H. pylori results as well.  Referral placed for GI for her to schedule out further evaluation and input.  - H. pylori Antibody, IgG  - ALT (SGPT)    2. Burping  See #1  - omeprazole (PRILOSEC) 20 MG capsule; Take 1 capsule (20 mg total) by mouth 2 (two) times a day before meals.  Dispense: 28 capsule; Refill: 0  - H. pylori Antibody, IgG      There are no Patient Instructions on file for this visit.    Orders Placed This Encounter   Procedures     H. pylori Antibody, IgG     ALT (SGPT)     There are no discontinued medications.        Chief Complaint:   Chief Complaint   Patient presents with     Abdominal Pain       History of Present Illness:  Corinna is a 47 y.o. female presenting to the clinic today Ana Luisa is a 47-year-old female with history of breast cancer and melanoma presenting today for continuing problems with abdominal pain.  She has associated symptoms including bloating, gassiness, burping and cramping.  Pain occurs in episodes that she cannot necessarily pin down  "her associate with time of day or eating habits or specific foods.  When she gets the pain and bloating she feels that she needs to rest, the pants are too tight and she needs to lay down.  Stools have not changed in consistency, she states she has looser stools all the time.  She has not had any blood in the stools.  She has not had any \"heartburn\" however she does states she has noticed more epigastric pain as of late.  She did feel some burning sensation in the epigastric area once.  Did previous courses of chemotherapy several years ago for breast cancer and now her periods are regular.  She has a planned pelvic ultrasound on Friday that was ordered by Dr. Henry at her previous visit.  She saw her 13 June.  At that time she also did teach CT scan that was negative for any acute findings or explanation of pain.  Urination has been normal.  No recent lab work has been done, no history of gastric ulcers.  No fevers or chills.. Dannielle states that episodes have increased in frequency and are becoming more bothersome.  It is interfering with her daily life and she would like some symptomatic relief.  Wondering what she can do in the meantime all tests are being run for symptom control.  She has been using simethicone at home without much relief.    Review of Systems:  All other systems are negative except as noted above.    [facility]:  Reviewed and updated.     Tobacco Use:  History   Smoking Status     Never Smoker   Smokeless Tobacco     Never Used       Vitals:  Vitals:    06/22/17 1014   BP: 122/78   Patient Site: Left Arm   Patient Position: Sitting   Cuff Size: Adult Regular   Pulse: 74   Resp: 14   Weight: 138 lb 6 oz (62.8 kg)     Wt Readings from Last 3 Encounters:   06/22/17 138 lb 6 oz (62.8 kg)   06/13/17 141 lb 12.8 oz (64.3 kg)   02/23/17 139 lb (63 kg)       Physical Exam:  Constitutional:  Reveals an alert, cooperative, 47 year old female in no acute distress.  Vitals:  Per nursing notes.  GI:  " non-tender, slightly distended.  Neither liver nor spleen palpable.  Psychiatric:  Mood appropriate, memory intact.     Data Reviewed:  Additional History from Old Records or Another Person Summarized (2 total): previous ct, visit with Dr. Henry 6/13/17    Decision to Obtain Extra information (1 total): None.     Radiology Tests Summarized and Ordered (XRAY/CT/MRI/DXA) (1 total): None.    Labs Reviewed and Ordered (1 total): H pylori.    Medicine Tests Summarized and Ordered (EKG/ECHO/COLONOSCOPY/EGD) (1 total): None.    Independent Review of EKG or X-Ray (2 each): None.    The visit lasted a total of 25 minutes face to face with the patient. Over 50% of the time was spent counseling and educating the patient about plan of care.    Medications:  Current Outpatient Prescriptions   Medication Sig Dispense Refill     cholecalciferol, vitamin D3, 1,000 unit tablet Take 1,000 Units by mouth daily.       omeprazole (PRILOSEC) 20 MG capsule Take 1 capsule (20 mg total) by mouth 2 (two) times a day before meals. 28 capsule 0     No current facility-administered medications for this visit.        Total Data Points: 3    MARIANN Osullivan, CNP    This note has been dictated using voice recognition software. Any grammatical or context distortions are unintentional and inherent to the software

## 2021-06-12 NOTE — PROGRESS NOTES
[facility] Progress Note    Patient: Corinna Kinney  MRN: [id number]  Date of Service: 8/14/2017        Reason for visit      1. Breast cancer of upper-outer quadrant of right female breast    2. Malignant Melanoma Of The Skin        Assessment     1. CA breast right-sided T2 N1 M0 ER/KY negative HER-2/tiffani 3+ status post neoadjuvant chemotherapy with carboplatin and Taxotere and Herceptin which led to complete pathological response. She had bilateral mastectomy with reconstruction and then also received radiation therapy to her right breast.  This was in 2011.  2. Stage IA malignant melanoma from her right side of her back removed in 2012.  3. Negative genetic testing.  4. Good general health otherwise.   5. Redness in her eyes.  6. Some GI complaints that resolved them selves.    Plan     1.  At this time follow-up in 12 months.  2.  Follow-up with a dermatologist for full body dermatological checkup.  3.  Continue good diet and exercise.  4.  Topical treatment for the rash.    Clinical stage      Breast cancer of upper-outer quadrant of right female breast    Staging form: Breast, AJCC 7th Edition    - Clinical stage from 6/22/2011: Stage IIB (T2, N1, cM0, Free text: ER-,KY-, [id number] 3+) - Signed by Nicolas Massey MD on 1/25/2016    - Pathologic: No stage assigned - Unsigned    Malignant Melanoma Of The Skin    Staging form: Melanoma of the Skin, AJCC 7th Edition    - Clinical: Stage IA (T1a, N0, M0) - Signed by Nicolas Massey MD on 1/25/2016    - Pathologic: No stage assigned - Unsigned    History     Corinna Kinney is a very pleasant 47 y.o. old female with a history of stage II invasive ductal carcinoma diagnosed in October 2011 located on her right breast presenting with the yellowish nipple discharge and workup revealed that she had a mass measuring over 2 cm in size with right axillary lymphadenopathy. She underwent biopsy of pelvic mass as well as a lymph node which showed high-grade  invasive ductal carcinoma ER/HI negative HER-2/tiffani 3+. She was treated with neoadjuvant carboplatin and Taxotere and Herceptin which resulted in complete pathological response. She then underwent bilateral mastectomy with reconstruction. She was also treated with radiation therapy to the right breast. She went on to finish one year of Herceptin. In October 2012 she was diagnosed to have melanoma located on her back which was 0.75 mm deep Deyvi's level IV with negative lymph nodes on reexcision. She has been followed up with observation for that. She did have a PET scan after the melanoma was diagnosed and that was negative.    She continues to follow with the dermatologist.  Gets a full body checkup at least once a year.  Comes in today for scheduled follow-up.  Since her last visit here she did undergo a little bit more of genetic testing at [facility] and that also came back negative.  She also had an episode of abdominal discomfort bloating etc. for which she ended up seeing gastroenterologist.  Her symptoms spontaneously resolved.  She is doing better on that.    Comes in today after chest x-ray.  Feels well.    Past Medical History     Past Medical History:   Diagnosis Date     Backache     Created by Conversion      Benign Pigmented Nevus     Created by Conversion      Breast cancer      Breast Neoplasm     Created by Conversion [facility] Annotation: Oct 21 2011  2:28PM - Beata Callahan: intraductal,  +HER2 receptors, -ER/PRtreated with carboplatin, taxotere and herceptin      Lentigo     Created by Conversion      Lymphedema     Created by Conversion      Malignant Melanoma Of The Skin     Created by Conversion      Metrorrhagia     Created by Conversion            Review of Systems   Constitutional  Constitutional (WDL): All constitutional elements are within defined limits  Neurosensory  Neurosensory (WDL): All neurosensory elements are within defined limits  Cardiovascular  Cardiovascular  (WDL): All cardiovascular elements are within defined limits  Pulmonary  Respiratory (WDL): Within Defined Limits  Gastrointestinal  Gastrointestinal (WDL): All gastrointestinal elements are within defined limits  Genitourinary  Genitourinary (WDL): All genitourinary elements are within defined limits  Integumentary  Integumentary (WDL): All integumentary elements are within defined limits  Patient Coping  Patient Coping: Accepting  Accompanied by  Accompanied by: Alone    ECOG performance status and Distress Assessment      ECOG Performance:    ECOG Performance Status: 0    Distress Assessment  Distress Assessment Score: No distress:     Pain Status  Currently in Pain: No/denies        Vital Signs     Vitals:    08/14/17 0943   BP: 120/76   Pulse: 65   Temp: 98.2  F (36.8  C)   SpO2: 100%       Physical Exam     GENERAL: No acute distress. Cooperative in conversation.   HEENT: Pupils are equal, round and reactive. Oral mucosa is clean and intact. No ulcerations or mucositis noted. No bleeding noted.  RESP:Chest symmetric lungs are clear bilaterally per auscultation. Regular respiratory rate. No wheezes or rhonchi.  CV: Normal S1 S2 Regular, rate and rhythm. No murmurs.  ABD: Nondistended, soft, nontender. Positive bowel sounds. No organomegaly.   EXTREMITIES: No lower extremity edema.   NEURO: Non- focal. Alert and oriented x3.  Cranial nerves appear intact.  PSYCH: Within normal limits. No depression or anxiety.  SKIN: Warm dry intact.    LYMPH NODES: Bilateral cervical, supraclavicular, axillary lymph node examination was done.  Negative for any palpable adenopathy.      Lab Results     Results for orders placed or performed in visit on 06/22/17   H. pylori Antibody, IgG   Result Value Ref Range    H Pylori IgG 0.28 <1.00 EV   ALT (SGPT)   Result Value Ref Range    ALT 20 0 - 45 U/L   Amylase   Result Value Ref Range    Amylase 32 5 - 120 U/L   Comprehensive Metabolic Panel   Result Value Ref Range    Sodium 139  136 - 145 mmol/L    Potassium 3.8 3.5 - 5.0 mmol/L    Chloride 104 98 - 107 mmol/L    CO2 21 (L) 22 - 31 mmol/L    Anion Gap, Calculation 14 5 - 18 mmol/L    Glucose 101 70 - 125 mg/dL    BUN 11 8 - 22 mg/dL    Creatinine 0.69 0.60 - 1.10 mg/dL    GFR MDRD Af Amer >60 >60 mL/min/1.73m2    GFR MDRD Non Af Amer >60 >60 mL/min/1.73m2    Bilirubin, Total 1.0 0.0 - 1.0 mg/dL    Calcium 9.5 8.5 - 10.5 mg/dL    Protein, Total 7.2 6.0 - 8.0 g/dL    Albumin 4.3 3.5 - 5.0 g/dL    Alkaline Phosphatase 58 45 - 120 U/L    AST 20 0 - 40 U/L    ALT 19 0 - 45 U/L   Lipase   Result Value Ref Range    Lipase 16 0 - 52 U/L         Imaging Results     No results found.      Nicolas Massey MD

## 2021-06-12 NOTE — TELEPHONE ENCOUNTER
Who is calling:  Patient   Reason for Call:  Patient states last Monday she was tested positive for COVID requesting prescription for acetylcysteine 10 ml bottles at 20% . For questions please reach out patient . Patient requested to send prescription to [facility] # [id number].  Date of last appointment with primary care: unknown   Okay to leave a detailed message: No

## 2021-06-12 NOTE — TELEPHONE ENCOUNTER
"Pt is calling in for infrormtion about Covid 19. Pt tested positive on Monday, and is wondering what she can do for her symptoms. Pt reports scratchy throat, slight cough, headache, and weakness. Pt reports headache pain \"8\". Pt was advised to call back if headache pain increases, and if Tylenol does not help.   Care advice given, discussed isolation, use of humidifier, use of a neti pot for sinus congestion, increasing fluids, warm chicken broth, or apple juice for scratchy throat, Tylenol for fever, headache or body aches.  Per protocol pt should be able to treat this at home. Pt agrees with plan, and was advised to call back if she develops difficulty breathing or chest tightness. Pt verbalized understanding.       Dane Kohler RN [facility] Triage/Medication Refill    Reason for Disposition    [1] COVID-19 diagnosed by positive lab test AND [2] mild symptoms (e.g., cough, fever, others) AND [3] no complications or SOB    Additional Information    Negative: SEVERE difficulty breathing (e.g., struggling for each breath, speaks in single words)    Negative: Difficult to awaken or acting confused (e.g., disoriented, slurred speech)    Negative: Bluish (or gray) lips or face now    Negative: Shock suspected (e.g., cold/pale/clammy skin, too weak to stand, low BP, rapid pulse)    Negative: Sounds like a life-threatening emergency to the triager    Negative: [1] COVID-19 exposure AND [2] no symptoms    Negative: COVID-19 and Breastfeeding, questions about    Negative: [1] Adult with possible COVID-19 symptoms AND [2] triager concerned about severity of symptoms or other causes    Negative: SEVERE or constant chest pain or pressure (Exception: mild central chest pain, present only when coughing)    Negative: MODERATE difficulty breathing (e.g., speaks in phrases, SOB even at rest, pulse 100-120)    Negative: Patient sounds very sick or weak to the triager    Negative: MILD difficulty breathing (e.g., minimal/no " SOB at rest, SOB with walking, pulse <100)    Negative: Chest pain or pressure    Negative: Fever > 103 F (39.4 C)    Negative: [1] Fever > 101 F (38.3 C) AND [2] age > 60    Negative: [1] Fever > 100.0 F (37.8 C) AND [2] bedridden (e.g., nursing home patient, CVA, chronic illness, recovering from surgery)    Negative: HIGH RISK patient (e.g., age > 64 years, diabetes, heart or lung disease, weak immune system) (Exception: Has already been evaluated by healthcare provider and has no new or worsening symptoms)    Negative: Fever present > 3 days (72 hours)    Negative: [1] Fever returns after gone for over 24 hours AND [2] symptoms worse or not improved    Negative: [1] Continuous (nonstop) coughing interferes with work or school AND [2] no improvement using cough treatment per protocol    Negative: [1] COVID-19 infection suspected by caller or triager AND [2] mild symptoms (cough, fever, or others) AND [3] no complications or SOB    Negative: Cough present > 3 weeks    Protocols used: CORONAVIRUS (COVID-19) DIAGNOSED OR [id number] 8.4.20

## 2021-06-12 NOTE — PROGRESS NOTES
"Corinna Kinney is a 50 y.o. female who is being evaluated via a billable video visit.      The patient has been notified of following:     \"This video visit will be conducted via a call between you and your physician/provider. We have found that certain health care needs can be provided without the need for an in-person physical exam.  This service lets us provide the care you need with a video conversation.  If a prescription is necessary we can send it directly to your pharmacy.  If lab work is needed we can place an order for that and you can then stop by our lab to have the test done at a later time.    Video visits are billed at different rates depending on your insurance coverage. Please reach out to your insurance provider with any questions.    If during the course of the call the physician/provider feels a video visit is not appropriate, you will not be charged for this service.\"    Patient has given verbal consent to a Video visit? Yes  How would you like to obtain your AVS? AVS Preference: MyChart.  If dropped by the video visit, the video invitation should be sent to: Send to e-mail at: [email or web address]  Will anyone else be joining your video visit? No      Video Start Time: 7:11 AM      HPI:     Patient was diagnosed with COVID-19 about a week ago.  Is not having any respiratory symptoms but is still having some lingering congestion, cough and headache.  Has been using OTC Mucinex with some improvement but not a lot.  Her  works in the med device industry and he told her that acetylcysteine is FDA approved for COVID-19 symptoms and wanted to try it to see if that will help with her symptoms.  Patient is doing well overall and improving from her initial diagnosis.    A/P:   Discussed with patient that currently, acetylcysteine is not recommended or indicated for Covid first-line, especially for a patient management at this time.  It has been more than 1 week since her diagnosis and " although she has lingering symptoms, overall she is improving so I would continue using OTC medication.  Patient understands.  No further intervention.    Video-Visit Details    Type of service:  Video Visit    Video End Time (time video stopped): 7:19 MA  Originating Location (pt. Location): Home    Distant Location (provider location):  [facility]     Platform used for Video Visit: Reji Carter DO

## 2021-06-12 NOTE — TELEPHONE ENCOUNTER
Please advise patient to schedule an appointment to discuss her symptoms and why she is requesting this medication. This is not typically prescribed, so you can let her know she will need an appointment to discuss her symptoms and request for this medication.     I am out for 2 weeks. So unfortunately it will have to be with another provider.    Dr. Toledo

## 2021-06-12 NOTE — TELEPHONE ENCOUNTER
Called and spoke with patient.  Informed her of provider's recommendations.  Assisted with scheduling a video/virtual appointment for 11/10/20.  Patient verbalized understanding and is agreeable with the plan of care.

## 2021-06-13 NOTE — PROGRESS NOTES
[facility] Progress Note    Patient: Corinna Kinney  MRN: [id number]  Date of Service: 12/9/2020        Reason for visit      1. Malignant neoplasm of upper-outer quadrant of right breast in female, estrogen receptor negative (H)        Assessment     1. CA breast right-sided T2 N1 M0 ER/UT negative HER-2/tiffani 3+ status post neoadjuvant chemotherapy with carboplatin and Taxotere and Herceptin which led to complete pathological response. She had bilateral mastectomy with reconstruction and then also received radiation therapy to her right breast.  This was in 2011.  2. Stage IA malignant melanoma from her right side of her back removed in 2012.  She follows up with [facility] dermatology.  3. Negative genetic testing.  4. Good general health otherwise.   5. Some GI complaints that keep recurring.  She did have a colonoscopy recently.  6.  Was diagnosed with COVID-19 back in November 2020.    Plan     1.  At this time follow-up in 12 months.  2.  Follow-up with a dermatologist for full body dermatological checkup.  3.  I also advised that she should see gastroneurologist and get an EGD done.  She has been previously told by her primary care physician to get a colonoscopy.  4.  Continue with good diet and exercise.    Clinical stage      Cancer Staging  Breast cancer of upper-outer quadrant of right female breast (H)  Staging form: Breast, AJCC 7th Edition  - Clinical stage from 6/22/2011: Stage IIB (T2, N1, cM0, Free text: ER-,UT-, [id number] 3+) - Signed by Nicolas Massey MD on 1/25/2016  - Pathologic: No stage assigned - Unsigned    Malignant Melanoma Of The Skin  Staging form: Melanoma of the Skin, AJCC 7th Edition  - Clinical: Stage IA (T1a, N0, M0) - Signed by Nicolas Massey MD on 1/25/2016  - Pathologic: No stage assigned - Unsigned      History     Corinna Kinney is a very pleasant 50 y.o. old female with a history of stage II invasive ductal carcinoma diagnosed in October 2011  located on her right breast presenting with the yellowish nipple discharge and workup revealed that she had a mass measuring over 2 cm in size with right axillary lymphadenopathy. She underwent biopsy of pelvic mass as well as a lymph node which showed high-grade invasive ductal carcinoma ER/CA negative HER-2/tiffani 3+. She was treated with neoadjuvant carboplatin and Taxotere and Herceptin which resulted in complete pathological response. She then underwent bilateral mastectomy with reconstruction. She was also treated with radiation therapy to the right breast. She went on to finish one year of Herceptin. In October 2012 she was diagnosed to have melanoma located on her back which was 0.75 mm deep Deyvi's level IV with negative lymph nodes on reexcision. She has been followed up with observation for that. She did have a PET scan after the melanoma was diagnosed and that was negative.    She continues to follow with the dermatologist.  Gets a full body checkup at least once a year.   She did undergo a little bit more of genetic testing at [facility] and that also came back negative.      Comes in today for follow-up.  Feels well.  She did diagnosed with COVID-19 back in November 2020.  Was sick for about 10 days.  Quarantine for about 2 weeks.  Now feels great.  Was last seen at [facility] in June 2020.  She also underwent colonoscopy in November 2020.    Past Medical History     Past Medical History:   Diagnosis Date     Backache     Created by Conversion      Benign Pigmented Nevus     Created by Conversion      Breast cancer (H)      Breast Neoplasm     Created by Conversion [facility] Annotation: Oct 21 2011  2:28PM - Beata Callahan: intraductal,  +HER2 receptors, -ER/PRtreated with carboplatin, taxotere and herceptin      Lentigo     Created by Conversion      Lymphedema     Created by Conversion      Malignant Melanoma Of The Skin     Created by Conversion      Metrorrhagia     Created by  Conversion            Review of Systems   Constitutional  Constitutional (WDL): Exceptions to WDL  Neurosensory  Neurosensory (WDL): All neurosensory elements are within defined limits  Cardiovascular  Cardiovascular (WDL): All cardiovascular elements are within defined limits  Pulmonary  Respiratory (WDL): Within Defined Limits  Gastrointestinal  Gastrointestinal (WDL): All gastrointestinal elements are within defined limits  Genitourinary  Genitourinary (WDL): All genitourinary elements are within defined limits  Integumentary  Integumentary (WDL): All integumentary elements are within defined limits  Patient Coping  Patient Coping: Accepting  Accompanied by  Accompanied by: Alone    ECOG performance status and Distress Assessment      ECOG Performance:    ECOG Performance Status: 0    Distress Assessment  Distress Assessment Score: No distress:     Pain Status  Currently in Pain: No/denies        Vital Signs     Vitals:    12/09/20 0909   BP: 135/73   Pulse: 89   Temp: 98.6  F (37  C)   SpO2: 98%       Physical Exam     GENERAL: No acute distress. Cooperative in conversation.   HEENT: Pupils are equal, round and reactive. Oral mucosa is clean and intact. No ulcerations or mucositis noted. No bleeding noted.  RESP:Chest symmetric lungs are clear bilaterally per auscultation. Regular respiratory rate. No wheezes or rhonchi.  CV: Normal S1 S2 Regular, rate and rhythm. No murmurs.  ABD: Nondistended, soft, nontender. Positive bowel sounds. No organomegaly.   EXTREMITIES: No lower extremity edema.   NEURO: Non- focal. Alert and oriented x3.  Cranial nerves appear intact.  PSYCH: Within normal limits. No depression or anxiety.  SKIN: Warm dry intact.    LYMPH NODES: Bilateral cervical, supraclavicular, axillary lymph node examination was done.  Negative for any palpable adenopathy.  BREAST: Status post bilateral mastectomy.  Good reconstruction results.  No evidence of any recurrence.      Lab Results     Results for  orders placed or performed in visit on 12/09/20   Comprehensive Metabolic Panel   Result Value Ref Range    Sodium 141 136 - 145 mmol/L    Potassium 4.1 3.5 - 5.0 mmol/L    Chloride 105 98 - 107 mmol/L    CO2 28 22 - 31 mmol/L    Anion Gap, Calculation 8 5 - 18 mmol/L    Glucose 112 70 - 125 mg/dL    BUN 12 8 - 22 mg/dL    Creatinine 0.84 0.60 - 1.10 mg/dL    GFR MDRD Af Amer >60 >60 mL/min/1.73m2    GFR MDRD Non Af Amer >60 >60 mL/min/1.73m2    Bilirubin, Total 0.7 0.0 - 1.0 mg/dL    Calcium 9.5 8.5 - 10.5 mg/dL    Protein, Total 7.3 6.0 - 8.0 g/dL    Albumin 4.4 3.5 - 5.0 g/dL    Alkaline Phosphatase 59 45 - 120 U/L    AST 20 0 - 40 U/L    ALT 33 0 - 45 U/L   HM1 (CBC with Diff)   Result Value Ref Range    WBC 5.3 4.0 - 11.0 thou/uL    RBC 4.96 3.80 - 5.40 mill/uL    Hemoglobin 14.5 12.0 - 16.0 g/dL    Hematocrit 43.1 35.0 - 47.0 %    MCV 87 80 - 100 fL    MCH 29.2 27.0 - 34.0 pg    MCHC 33.6 32.0 - 36.0 g/dL    RDW 12.3 11.0 - 14.5 %    Platelets 275 140 - 440 thou/uL    MPV 8.9 8.5 - 12.5 fL    Neutrophils % 65 50 - 70 %    Lymphocytes % 24 20 - 40 %    Monocytes % 7 2 - 10 %    Eosinophils % 3 0 - 6 %    Basophils % 2 0 - 2 %    Immature Granulocyte % 0 <=0 %    Neutrophils Absolute 3.4 2.0 - 7.7 thou/uL    Lymphocytes Absolute 1.3 0.8 - 4.4 thou/uL    Monocytes Absolute 0.4 0.0 - 0.9 thou/uL    Eosinophils Absolute 0.2 0.0 - 0.4 thou/uL    Basophils Absolute 0.1 0.0 - 0.2 thou/uL    Immature Granulocyte Absolute 0.0 <=0.0 thou/uL         Imaging Results     No results found.      Nicolas Massey MD

## 2021-06-16 PROBLEM — Z85.820 HISTORY OF MELANOMA: Status: ACTIVE | Noted: 2017-08-03

## 2021-06-16 PROBLEM — R14.0 ABDOMINAL BLOATING: Status: ACTIVE | Noted: 2017-08-03

## 2021-06-16 PROBLEM — R14.0 ABDOMINAL DISTENSION, GASEOUS: Status: ACTIVE | Noted: 2017-08-03

## 2021-06-18 NOTE — LETTER
Letter by Alvin Luciano MD at      Author: Alvin Luciano MD Service: -- Author Type: --    Filed:  Encounter Date: 1/30/2019 Status: (Other)       Corinna EDUARDO Nirmal  [address] [place] [address]             January 30, 2019         Dear Ms. Kinney,    I tried calling earlier and your voice mailbox is full. Your x-ray was normal. Your blood test to look for blood clot was negative, so my suspicion for a clot in your lungs is very low. We do not need to proceed with any further imaging of your chest at this time. Please continue with the ranitidine and the Holter monitor set up. I'll call in a week to see how your symptoms are doing on the medication.         Below are the results from your recent visit:    Resulted Orders   D-dimer, Quantitative   Result Value Ref Range    D-Dimer, Quant 0.31 <=0.50 FEU ug/mL    Narrative    0.50 ug/mL(FEU) = cutoff value for exclusion of DVT/PE         Please call with questions or contact us using [employer]    Sincerely,        Electronically signed by Alvin Luciano MD

## 2021-06-18 NOTE — PATIENT INSTRUCTIONS - HE
Patient Instructions by Alvin Luciano MD at 7/22/2020 10:40 AM     Author: Alvin Luciano MD Service: -- Author Type: Physician    Filed: 7/22/2020 11:17 AM Encounter Date: 7/22/2020 Status: Signed    : Alvin Luciano MD (Physician)           Preventing Skin Cancer     Use sunscreen of SPF 30 or greater. Apply liberally.   Relaxing in the sun may feel good. But it isnt good for your skin. In fact, the suns harmful rays are the major cause of skin cancer. This is a serious disease that can be life-threatening. People of all ages, races, and backgrounds are at risk.  Skin cancer is the most common cancer in the [place] But in most cases, it can be prevented.  Your role in prevention  You can act today to help prevent skin cancer. Start by avoiding the suns UV (ultraviolet) rays. And dont use tanning beds or lamps. They are no safer than the sun. Taking these steps can help keep you from getting skin cancer. It can also help prevent wrinkles and other aging effects caused by the sun. Make sure your children also follow these safeguards. Now is the time to start taking steps to prevent skin cancer.  When you are outdoors  Protect your skin when you go out during the day. Take safety steps whenever you go out to eat, run errands by car or on foot, or do any outdoor activity. There isnt just one easy way to protect your skin. Its best to follow all of these steps:    Wear tightly woven clothing that covers your skin. Put on a wide-brimmed hat to protect your face, ears, and scalp.    Watch the clock. Try to stay out of the sun between 10 a.m. and 4 p.m. That's when the sun's rays are strongest.    Head for the shade or create your own. Use an umbrella when sitting or strolling.    Know that the suns rays can reflect off sand, water, and snow. This can harm your skin. Take extra care when you are near reflective surfaces.    Keep in mind that even when the weather is hazy or  "cloudy, your skin can be exposed to strong UV rays.    Shield your skin with sunscreen. Also use sunscreen on your childrens skin. Keep babies younger than 6 months old out of the sun.  Tips for using sunscreen  To help prevent skin cancer, choose the right sunscreen and use it correctly. Try these tips:    Choose a sunscreen that has an SPF (sun protection factor) of at least 30. Also choose a sunscreen labeled \"broad spectrum. This will protect you from both UVA and UVB (ultraviolet A and B) rays.    If one brand irritates your skin, try another, such as one without fragrance.    Use a water-resistant sunscreen if you swim or sweat.    Use at least 1 ounce of sunscreen to cover exposed areas. This is enough to fill a shot glass. You might need to adjust the amount depending on your body size.    Put the sunscreen on dry skin about 15 minutes before going outdoors. This gives it time to soak in.    Reapply sunscreen every 2 hours. If youre active, do this more often.    Cover any sun-exposed skin, from your face to your feet. Dont forget your scalp, ears, and lips.    Know that while sunscreen helps protect you, it isnt enough. Sunscreens extend the length of time you can be outdoors before your skin starts to get red. But they don't give you total protection. Using sunscreen doesn't mean you can stay out in the sun for an unlimited time. Your skin cells are still being damaged. You should also wear protective clothing. And try to stay out of the sun as much as you can, especially from 10 a.m. to 4 p.m.  Date Last Reviewed: 7/1/2019 2000-2019 [employer]. [address] [place] [address], [place] [address]. All rights reserved. This information is not intended as a substitute for professional medical care. Always follow your healthcare professional's instructions.             "

## 2021-06-18 NOTE — PROGRESS NOTES
"ASSESSMENT & PLAN:    1. Hyperlipidemia  - Lipid Grady; Future    2. Health maintenance examination  Pap with HPV testing today.  Continues to follow with oncology for breast cancer history.  Status post bilateral mastectomy and reconstruction.  Continues to follow with dermatology for melanoma history.  She has recently seen the genetic counselor again for updated testing, no genetic mutations identified to place her at increased risk.  She did mention today that her father has a history of \"big polyps\" in his colon.  Grandmother has had colon cancer.  Discussed with patient that if dad has had an advanced adenoma, she should have colon cancer screening now.  She will check on this history and get back to me.  She will return for fasting lipids.  She has had blood sugar within the last year that was normal.  She has annual labs through oncology as well.  - Gynecologic Cytology (PAP Smear)    3.  Recurrent herpes simplex  Rare herpes simplex outbreaks on her lower back.  Prescription for Valtrex sent with refills as needed.    There are no Patient Instructions on file for this visit.    No orders of the defined types were placed in this encounter.    There are no discontinued medications.    No Follow-up on file.    CHIEF COMPLAINT:  Chief Complaint   Patient presents with     Annual Exam     with Pap, patient is not fasting        HISTORY OF PRESENT ILLNESS:  Corinna is a 48 y.o. female presenting to the clinic today for a physical examination.    Health Maintenance: She is due for a pap today; last 10/26/12, normal negative HPV, repeat every 5 years. She is up to date on immunizations.     Recurrent Herpes: She would like a refill for Valtrex. She uses it for a spot on her lower back that appears every few years. She normally treats it with a 3 day course of Valtrex.    REVIEW OF SYSTEMS:   She continues to follow with oncology yearly for history of breast cancer. Follows with dermatology, melanoma 5-6 years " ago. Her last menstrual period was 2.5-3 weeks ago. She denies any abnormal bleeding or spotting, regular periods. She takes a daily vitamin D3 supplement. She sees an eye doctor regularly. All other systems are negative.    PFSH:  Family: PGM with colon cancer, father with colon polyps.  Social: Non smoker.    Social History     Social History     Marital status:      Spouse name: N/A     Number of children: 3     Years of education: N/A     Occupational History     INSTRUCTOR/FACULTY [employer]     Social History Main Topics     Smoking status: Never Smoker     Smokeless tobacco: Never Used     Alcohol use 0.6 oz/week     1 Cans of beer per week      Comment: social     Drug use: No     Sexual activity: No     Other Topics Concern     Not on file     Social History Narrative    .  3 kids.  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       Past Medical History:   Diagnosis Date     Backache     Created by Conversion      Benign Pigmented Nevus     Created by Conversion      Breast cancer      Breast Neoplasm     Created by Conversion [facility] Annotation: Oct 21 2011  2:28PM - Beata Callahan: intraductal,  +HER2 receptors, -ER/PRtreated with carboplatin, taxotere and herceptin      Lentigo     Created by Conversion      Lymphedema     Created by Conversion      Malignant Melanoma Of The Skin     Created by Conversion      Metrorrhagia     Created by Conversion        Past Surgical History:   Procedure Laterality Date     KNEE SURGERY       MASTECTOMY Bilateral      [place] BIOPSY OF BREAST, INCISIONAL      Description: Biopsy Breast Open;  Recorded: 11/12/2012;       No Known Allergies    Active Ambulatory Problems     Diagnosis Date Noted     Lentigo      Breast cancer of upper-outer quadrant of right female breast      Metrorrhagia      Malignant Melanoma Of The Skin      Backache      Benign Pigmented Nevus      Post-mastectomy lymphedema syndrome 06/18/2015     Scar condition and fibrosis of  "skin 06/18/2015     Paresthesia of arm 06/18/2015     Personal history of breast cancer 06/18/2015     Meningioma 08/22/2016     Resolved Ambulatory Problems     Diagnosis Date Noted     Lymphedema      Past Medical History:   Diagnosis Date     Backache      Benign Pigmented Nevus      Breast cancer      Breast Neoplasm      Lentigo      Lymphedema      Malignant Melanoma Of The Skin      Metrorrhagia        Family History   Problem Relation Age of Onset     Rheum arthritis Father      Asthma Brother      No Medical Problems Mother      No Medical Problems Daughter      No Medical Problems Daughter      No Medical Problems Daughter      Aneurysm Maternal Grandmother      Colon cancer Paternal Grandmother        VITALS:  Vitals:    05/18/18 1110   BP: 116/68   Patient Site: Left Arm   Patient Position: Sitting   Cuff Size: Adult Regular   Pulse: 72   Resp: 14   Weight: 138 lb 4 oz (62.7 kg)   Height: 5' 5\" (1.651 m)     Wt Readings from Last 3 Encounters:   05/18/18 138 lb 4 oz (62.7 kg)   08/14/17 140 lb 11.2 oz (63.8 kg)   06/22/17 138 lb 6 oz (62.8 kg)       PHYSICAL EXAM:  GENERAL: Pleasant, well-appearing woman in no acute distress.  VITAL SIGNS:  Reviewed.  HEENT: Pupils are equal, round, and reactive to light. Sclerae and  conjunctivae clear. TMs are clear bilaterally.   NECK: Supple without lymphadenopathy or thyromegaly. No carotid bruits.  CARDIOVASCULAR:  Heart regular rate and rhythm without murmur. Normal S1 and S2.  LUNGS: Clear to auscultation bilaterally without wheezes or crackles. Good air movement throughout.  BREASTS: Normal contours. No skin dimpling, nipple retraction or nipple  discharge. Palpation reveals no masses, no supraclavicular or axillary  lymphadenopathy.    ABDOMEN:  Soft, nontender, and nondistended without guarding or rebound. No organomegaly.  PELVIS: Normal female external genitalia.  No skin lesions.  Speculum exam reveals normal-appearing cervix and normal vaginal mucosa. Pap " smear collected. Bimanual exam reveals no cervical motion tenderness, no uterine or adnexal masses or tenderness.  EXTREMITIES: Warm and well perfused without edema. Dorsalis pedis pulses easily palpable and symmetric bilaterally.  NEURO: Alert and oriented. Grossly nonfocal.  PSYCHIATRIC: Presents on time and well groomed.  Normal speech and thought content. Full affect. No abnormal movements or behaviors noted.     DATA REVIEWED:  ADDITIONAL HISTORY SUMMARIZED (2): Reviewed 8/14/17 note regarding breast cancer and 3/7/17 regarding breast cancer genetics.  DECISION TO OBTAIN EXTRA INFORMATION (1): None.   RADIOLOGY TESTS SUMMARIZED OR ORDERED (1): None.  LABS REVIEWED OR ORDERED (1): Labs were ordered today.  MEDICINE TESTS SUMMARIZED OR ORDERED (1): None.  INDEPENDENT REVIEW OF EKG OR X-RAY (2 EACH): None.       The visit lasted a total of 20 minutes face to face with the patient. Over 50% of the time was spent counseling and educating the patient about health maintenance.    IDarlene, am scribing for and in the presence of, Dr. Henry.    I, Dr. Henry, personally performed the services described in this documentation, as scribed by Darlene Lombardi in my presence, and it is both accurate and complete.    MEDICATIONS:  Current Outpatient Prescriptions   Medication Sig Dispense Refill     valACYclovir (VALTREX) 500 MG tablet Take 500 mg by mouth 2 (two) times a day.       cholecalciferol, vitamin D3, 1,000 unit tablet Take 1,000 Units by mouth daily.       No current facility-administered medications for this visit.          Total Data Points: 3

## 2021-06-20 NOTE — LETTER
Letter by Alvin Luciano MD at      Author: Alvin Luciano MD Service: -- Author Type: --    Filed:  Encounter Date: 8/7/2020 Status: (Other)         Corinna Kinney  [address] [facility] [address]             August 7, 2020         Dear Ms. Kinney,    Below are the results from your recent visit:    Resulted Orders   US Pelvis With Transvaginal Non OB    Narrative    EXAM: US PELVIS WITH TRANSVAGINAL NON OB  LOCATION: [facility]  DATE/TIME: 8/6/2020 1:09 PM    INDICATION: heavy periods with cramping/clots  COMPARISON: Pelvic ultrasound 6/23/2017 and CT pelvis 6/13/2017  TECHNIQUE: Transabdominal scans were performed. Endovaginal ultrasound was performed to better visualize the adnexa.    FINDINGS:    UTERUS: 9.5 x 6.2 x 5.1 cm. Normal in size and position with no masses.    ENDOMETRIUM: 6 mm. Normal smooth endometrium.    RIGHT OVARY: 2.3 x 1.3 x 1.3 cm. Normal with flow demonstrated.    LEFT OVARY: 2.9 x 2.1 x 2.8 cm. Normal with flow demonstrated. Incidental dominant follicle measuring 2.2 cm.    No significant free fluid.      Impression    1.  Normal pelvic ultrasound. No endometrial thickening or sonographically evident uterine fibroid.              Anibal Weinstein     Hope you are well.  Your pelvic ultrasound was normal.  There is no evidence of any thickening of the lining of your uterus or any fibroids that would be causing your heavy periods.     Please let me know if you any questions or concerns,    Dr. Toledo    Please call with questions or contact us using [employer].    Sincerely,        Electronically signed by Alvin Luciano MD

## 2021-06-20 NOTE — PROGRESS NOTES
"ASSESSMENT/PLAN:    Diagnoses and all orders for this visit:    Urinary frequency  -     Urinalysis-UC if Indicated  -     Culture, Urine          Urine Culture will be sent.  Patient can push fluids and use symptomatic care.  Discussed that could be symptoms of irritation worse than infection given negative urine, will await culture results.      SUBJECTIVE:  Corinna Kinney is a 48 y.o. female here for 2 weeks of urinary frequency. At end of cream she has a slight burning or odd sensation.  No other symptoms.  No more systemic symptoms of infection.    She complains of the following symptoms for 14 days:        Yes Dysuria   Yes Urinary Frequency   Yes Urinary Urgency   No Nocturia   No Hematuria   No Vaginal Discharge   No Fever/Chills   No Flank pain   No Nausea   No Vomitting   No Symptoms are worsening.   No History of recurrent UTI   No Pregnant, diabetic, immunocompromised     Overall, symptoms are Stable    Patient Active Problem List   Diagnosis     Lentigo     Breast cancer of upper-outer quadrant of right female breast (H)     Metrorrhagia     Malignant Melanoma Of The Skin     Backache     Benign Pigmented Nevus     Post-mastectomy lymphedema syndrome     Scar condition and fibrosis of skin     Paresthesia of arm     Personal history of breast cancer     Meningioma (H)     Current Outpatient Prescriptions   Medication Sig Dispense Refill     cholecalciferol, vitamin D3, 1,000 unit tablet Take 1,000 Units by mouth daily.       No current facility-administered medications for this visit.        OBJECTIVE:  :  /84 (Patient Site: Left Arm, Patient Position: Sitting, Cuff Size: Adult Regular)  Pulse 73  Temp 97.5  F (36.4  C) (Oral)   Resp 16  Ht 5' 5\" (1.651 m)  Wt 137 lb (62.1 kg)  LMP 09/14/2018  BMI 22.8 kg/m2    Gen:  A&A, NAD  CV:  HRRR, no M/R/G  Resp:  CTAB  Abd:  Soft.   Non-tender  Back: no flank tenderness  Ext:  Warm and dry, without edema     Lab results:    Results for orders " placed or performed in visit on 09/14/18   Urinalysis-UC if Indicated   Result Value Ref Range    Color, UA Yellow Colorless, Yellow, Straw, Light Yellow    Clarity, UA Clear Clear    Glucose, UA Negative Negative    Bilirubin, UA Negative Negative    Ketones, UA Negative Negative    Specific Gravity, UA >=1.030 1.005 - 1.030    Blood, UA Moderate (!) Negative    pH, UA 6.0 5.0 - 8.0    Protein, UA Negative Negative mg/dL    Urobilinogen, UA 0.2 E.U./dL 0.2 E.U./dL, 1.0 E.U./dL    Nitrite, UA Negative Negative    Leukocytes, UA Negative Negative    Bacteria, UA Few (!) None Seen hpf    RBC, UA 0-2 None Seen, 0-2 hpf    WBC, UA 0-5 None Seen, 0-5 hpf    Squam Epithel, UA 0-5 None Seen, 0-5 lpf

## 2021-06-20 NOTE — PROGRESS NOTES
[facility] Cancer Care Progress Note    Patient: Corinna Kinney  MRN: [id number]  Date of Service: 9/12/2018        Reason for visit      1. Malignant neoplasm of upper-outer quadrant of right breast in female, estrogen receptor negative (H)    2. Malignant Melanoma Of The Skin        Assessment     1. CA breast right-sided T2 N1 M0 ER/VT negative HER-2/tiffani 3+ status post neoadjuvant chemotherapy with carboplatin and Taxotere and Herceptin which led to complete pathological response. She had bilateral mastectomy with reconstruction and then also received radiation therapy to her right breast.  This was in 2011.  2. Stage IA malignant melanoma from her right side of her back removed in 2012.  3. Negative genetic testing.  4. Good general health otherwise.   5. Some GI complaints that keep recurring.    Plan     1.  At this time follow-up in 12 months.  2.  Follow-up with a dermatologist for full body dermatological checkup.  3.  I also advised that she should see gastroneurologist and get an EGD done.  She has been previously told by her primary care physician to get a colonoscopy.  4.  Continue with good diet and exercise.    Clinical stage      Breast cancer of upper-outer quadrant of right female breast (H)    Staging form: Breast, AJCC 7th Edition    - Clinical stage from 6/22/2011: Stage IIB (T2, N1, cM0, Free text: ER-,VT-, Xqf1dwt 3+) - Signed by Nicolas Massey MD on 1/25/2016    - Pathologic: No stage assigned - Unsigned    Malignant Melanoma Of The Skin    Staging form: Melanoma of the Skin, AJCC 7th Edition    - Clinical: Stage IA (T1a, N0, M0) - Signed by Nicolas Massey MD on 1/25/2016    - Pathologic: No stage assigned - Unsigned    History     Corinna Kniney is a very pleasant 48 y.o. old female with a history of stage II invasive ductal carcinoma diagnosed in October 2011 located on her right breast presenting with the yellowish nipple discharge and workup revealed that she had a mass  measuring over 2 cm in size with right axillary lymphadenopathy. She underwent biopsy of pelvic mass as well as a lymph node which showed high-grade invasive ductal carcinoma ER/AZ negative HER-2/tiffani 3+. She was treated with neoadjuvant carboplatin and Taxotere and Herceptin which resulted in complete pathological response. She then underwent bilateral mastectomy with reconstruction. She was also treated with radiation therapy to the right breast. She went on to finish one year of Herceptin. In October 2012 she was diagnosed to have melanoma located on her back which was 0.75 mm deep Deyvi's level IV with negative lymph nodes on reexcision. She has been followed up with observation for that. She did have a PET scan after the melanoma was diagnosed and that was negative.    She continues to follow with the dermatologist.  Gets a full body checkup at least once a year.  Comes in today for scheduled follow-up.  Since her last visit here she did undergo a little bit more of genetic testing at [facility] and that also came back negative.  She also had an episode of abdominal discomfort bloating etc. for which she ended up seeing gastroenterologist.  Her symptoms spontaneously resolved.  But then they reoccurred again.  She saw her primary care provider.  Has been recommended to go and get a colonoscopy done.  She has not done that yet.    Comes in today for follow-up.  Feels well.    Past Medical History     Past Medical History:   Diagnosis Date     Backache     Created by Conversion      Benign Pigmented Nevus     Created by Conversion      Breast cancer (H)      Breast Neoplasm     Created by Conversion Health system Annotation: Oct 21 2011  2:28PM - Beata Callahan: intraductal,  +HER2 receptors, -ER/PRtreated with carboplatin, taxotere and herceptin      Lentigo     Created by Conversion      Lymphedema     Created by Conversion      Malignant Melanoma Of The Skin     Created by Conversion      Metrorrhagia      Created by Conversion            Review of Systems   Constitutional  Constitutional (WDL): All constitutional elements are within defined limits  Neurosensory  Neurosensory (WDL): All neurosensory elements are within defined limits  Cardiovascular  Cardiovascular (WDL): All cardiovascular elements are within defined limits  Pulmonary  Respiratory (WDL): Within Defined Limits  Gastrointestinal  Gastrointestinal (WDL): Exceptions to WDL (bloating after eating)  Genitourinary  Genitourinary (WDL): All genitourinary elements are within defined limits  Integumentary  Integumentary (WDL): All integumentary elements are within defined limits  Patient Coping  Patient Coping: Accepting  Accompanied by  Accompanied by: Alone    ECOG performance status and Distress Assessment      ECOG Performance:    ECOG Performance Status: 0    Distress Assessment  Distress Assessment Score: No distress:     Pain Status  Currently in Pain: No/denies        Vital Signs     Vitals:    09/12/18 1507   BP: 139/68   Pulse: 81   Temp: 98.3  F (36.8  C)   SpO2: 97%       Physical Exam     GENERAL: No acute distress. Cooperative in conversation.   HEENT: Pupils are equal, round and reactive. Oral mucosa is clean and intact. No ulcerations or mucositis noted. No bleeding noted.  RESP:Chest symmetric lungs are clear bilaterally per auscultation. Regular respiratory rate. No wheezes or rhonchi.  CV: Normal S1 S2 Regular, rate and rhythm. No murmurs.  ABD: Nondistended, soft, nontender. Positive bowel sounds. No organomegaly.   EXTREMITIES: No lower extremity edema.   NEURO: Non- focal. Alert and oriented x3.  Cranial nerves appear intact.  PSYCH: Within normal limits. No depression or anxiety.  SKIN: Warm dry intact.    LYMPH NODES: Bilateral cervical, supraclavicular, axillary lymph node examination was done.  Negative for any palpable adenopathy.  BREAST: Status post bilateral mastectomy.  Good reconstruction results.  No evidence of any  recurrence.      Lab Results     Results for orders placed or performed in visit on 05/18/18   Gynecologic Cytology (PAP Smear)   Result Value Ref Range    Case Report       Gynecologic Cytology Report                       Case: [id number]                                   Authorizing Provider:  Karin Henry MD     Collected:           05/18/2018 1255              Ordering Location:     [facility]     Received:            05/18/2018 1255                                     Medicine/OB                                                                  First Screen:          JUNE Denis                                                                                (ASCP)                                                                       Specimen:    SUREPATH PAP, SCREENING, Endocervical/cervical                                             Interpretation       Negative for squamous intraepithelial lesion or malignancy    Result Flag Normal Normal    Specimen Adequacy       Satisfactory for evaluation, endocervical/transformation zone component present    HPV Reflex? Yes regardless of result     HIGH RISK No     LMP/Menopause Date 5/7/2018     Abnormal Bleeding No     Pt Status NA     Birth Control/Hormones None     Previous Normal/Date 10/26/2012     Prev Abn Date/Dx NA     Cervical Appearance normal    HPV High Risk DNA Cervical   Result Value Ref Range    HPV Source SurePath     HPV16 DNA Negative NEG    HPV18 DNA Negative NEG    Other HR HPV Negative NEG    Final Diagnosis SEE NOTES     Specimen Description Cervical Cells          Imaging Results     No results found.      Nicolas Massey MD

## 2021-06-21 ENCOUNTER — TELEPHONE (OUTPATIENT)
Dept: GASTROENTEROLOGY | Facility: CLINIC | Age: 51
End: 2021-06-21

## 2021-06-21 NOTE — TELEPHONE ENCOUNTER
Pt called to cx colonoscopy for 06/22/21  Rescheduled for August 30, 2021  Pt still has all the instructions

## 2021-06-23 NOTE — PROGRESS NOTES
"[facility]  Internal Medicine - Office Visit    Patient: Corinna Kinney   MRN: [id number]   Date of Service: 01/29/19   Patient Care Team:  Karin Henry MD as PCP - General (Family Medicine)  Shakira Whitt RN as Registered Nurse  Nicolas Massey MD as Physician (Hematology and Oncology)    ASSESSMENT/PLAN     Corinna Kinney is a 49 y/o woman with hx of R sided breast cancer (HER-2 positive) s/o chemotherapy and herceptin therapy, bilateral mastectomy with resconstruction, and radiation therapy (2011); malignant melanoma in R back; and chronic abdominal symptoms who presents today with chronic chest pain:    1. Intermittent, chronic chest pain  Does not sound cardiac in nature and patient is low risk. Patient denies palpitations, however does report sometimes a skipped beat. EKG here was NSR without any delta waves or concerning intervals. Given her hx of breast cancer and melanoma in past (no treatment since 2011), considered PE however pt's Wells score is 0. Patient does have chronic abdominal symptoms of gassiness/loose stools, but denies any reflux which might suggest GERD/esophagitis. Could be anxiety however pt really answered \"0.5\" across all the items and this would be a diagnosis of exlusion. Plan:    D-dimer given low risk for PE; if neg, can hold on further imaging    CXR    JEFF hook up x 2 weeks    Trial of ranitidine two times a day to see if reflux playing a role    Will call in a week to see if ranitidine helping at all    Alvin Luciano MD  Internal Medicine and Pediatrics  [facility]  Pager [phone]    SUBJECTIVE     Corinna Kinney is a 49 y/o woman with hx of R sided breast cancer (HER-2 positive) s/o chemotherapy and herceptin therapy, bilateral mastectomy with resconstruction, and radiation therapy (2011); malignant melanoma in R back; and chronic abdominal symptoms who presents today with chronic chest pain.    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 She describes it as a sharp pain that will last about 5-10 seconds, always right over her left breast and over her heart. There is no radiation of the pain. It happens every couple of days. It can occur at anytime of the day, even at rest. Last happened a few nights ago when she went out to dinner with friends. It does not seem to be reproduced with exertion and she has not had any exercise intolerance. She has not had any lightheadedness, palpitations, shortness of breath. She reports she'll get the occasional skipped beat \"that anyone would get,\" so hasn't really thought much of that. Because it is so short lived, she does not need to take anything for the pain. She thinks maybe it's worse with anxiety, however she doesn't really report much in terms of her anxiety. She is between 0-1 for each item on the MACRINA-7 scale.    She reports that she has had chronic abdominal bloating. About 2 years it was really bad. She would have bloating and pain. That did get better. Now only occasionally bloating 1-2 times in the last year. However she does report she gets very gassy after eating. Her stools are sometimes looser, but no melena/hematochezia. No epigastric pain or reflux.    No URI symptoms. No cough. No calf swelling or recent long travel/surgery. No family hx of any clotting. Dad has atrial fibrillation, but no other arrythmias, SCD, or unexplained death. No hx of MI in family.    Review of Systems  Pertinent items are noted in HPI    Past Medical/Surgical History  Reviewed and updated as appropriate    Immunizations  Reviewed     Medications    Current Outpatient Medications:      cholecalciferol, vitamin D3, 1,000 unit tablet, Take 1,000 Units by mouth daily., Disp: , Rfl:     Allergies  No Known Allergies    Family " "History  Reviewed and updated as appropriate.     Social History  Reviewed and updated as appropriate. Not a smoker.          OBJECTIVE       /80 (Patient Site: Left Arm, Patient Position: Sitting)   Pulse 92   Resp 16   Ht 5' 5\" (1.651 m)   Wt 142 lb 14.4 oz (64.8 kg)   SpO2 98%   BMI 23.78 kg/m      General Appearance:    Alert, cooperative, no acute distress, appears stated age   Lungs:     Clear to auscultation bilaterally, respirations unlabored   Chest Wall:    Non tender on palpation along anterior chest wall    Heart:    Regular rate and rhythm, S1 and S2 normal, no murmur, rub    or gallop   Abdomen:     Soft, non-tender, bowel sounds active all four quadrants,     no masses, no organomegaly     Labs/imaging/studies:  EKG: NSR. Incomplete RBBB. No delta waves. Normal intervals.          "

## 2021-06-23 NOTE — TELEPHONE ENCOUNTER
Telephone Encounter  Date: 02/08/19     Patient: Corinna Kinney   MRN: [id number]    Called to follow up on patient. Her chest pain still comes and goes, better than before though. She is taking her zantac twice a day. She is wearing the Holter. She's had a few episodes while wearing it.    Plan:  --Will touch base after holter    Alvin Luciano MD  Internal Medicine and Pediatrics  [facility]  Pager [phone]

## 2021-06-23 NOTE — TELEPHONE ENCOUNTER
"Pt calls to report chest pain -> onset three months ago.  \"Seems to correlate to high stress last fall.\"  \"Like little sharp pains.\"  Had been occurring daily.    NOT occurring daily any more.  NOT occurring today.  Last episode 48 hours ago.  \"Was driving to have dinner with friends.\"  Pain \"lasted a couple seconds, then came back a few mins later, then stayed gone for now.\"  No dizziness or difficulty breathing while episodes occur.    Pt reports taking a breast cancer drug -> Herceptin -> known to have potential cardiac side effects.  Completed this medication seven years ago, however now wonders if any connection to current symptoms.    Pt agrees to clinical eval today per triage disposition.  Warm transferred to a  for this purpose now.    Elly Torres RN BSBA  [facility] RN Triage     Reason for Disposition    Intermittent chest pains persist > 3 days    Protocols used: CHEST PAIN-A-OH      "

## 2021-06-24 NOTE — TELEPHONE ENCOUNTER
----- Message from Alvin Luciano MD sent at 2/25/2019  1:08 PM CST -----  Please call patient and see my note on 2/25. Thanks- Francy

## 2021-06-24 NOTE — TELEPHONE ENCOUNTER
Patient Returning Call  Reason for call:  LMTCB  Information relayed to patient:  Let patient know below information.  Patient verbalized understanding and had no questions at this time.  Patient has additional questions:  No  If YES, what are your questions/concerns:  N/A  Okay to leave a detailed message?: No call back needed

## 2021-06-24 NOTE — TELEPHONE ENCOUNTER
Please call the patient and let her know that her heart monitor did not show any abnormal heart rhythms that would be causing her the chest pain. She had 14 episodes while wearing the cardiac monitor and those all showed that she was in a normal sinus rhythm and her heart rate only got up to 110 maximum. This would not be the cause of her pain.     Please have her follow up with me in person in clinic in the next month or so to discuss next steps. She may also reach me via [employer].    LMTCB please relay message above to patient and help her set up an appointment

## 2021-06-24 NOTE — PROGRESS NOTES
To: [facility] Care Team Pool    Please call the patient and let her know that her heart monitor did not show any abnormal heart rhythms that would be causing her the chest pain. She had 14 episodes while wearing the cardiac monitor and those all showed that she was in a normal sinus rhythm and her heart rate only got up to 110 maximum. This would not be the cause of her pain.    Please have her follow up with me in person in clinic in the next month or so to discuss next steps. She may also reach me via [employer].    Dr. Luciano

## 2021-06-25 ENCOUNTER — APPOINTMENT (OUTPATIENT)
Dept: LAB | Facility: HOSPITAL | Age: 51
End: 2021-06-25
Payer: COMMERCIAL

## 2021-06-25 DIAGNOSIS — Z13.0 SCREENING FOR ENDOCRINE/METABOLIC/IMMUNITY DISORDERS: ICD-10-CM

## 2021-06-25 DIAGNOSIS — F10.11 HISTORY OF ALCOHOL ABUSE: ICD-10-CM

## 2021-06-25 DIAGNOSIS — Z13.228 SCREENING FOR ENDOCRINE/METABOLIC/IMMUNITY DISORDERS: ICD-10-CM

## 2021-06-25 DIAGNOSIS — Z13.220 SCREENING FOR HYPERLIPIDEMIA: ICD-10-CM

## 2021-06-25 DIAGNOSIS — Z13.1 DIABETES MELLITUS SCREENING: ICD-10-CM

## 2021-06-25 DIAGNOSIS — Z13.29 SCREENING FOR ENDOCRINE/METABOLIC/IMMUNITY DISORDERS: ICD-10-CM

## 2021-06-25 DIAGNOSIS — Z11.59 SCREENING FOR VIRAL DISEASE: ICD-10-CM

## 2021-06-25 LAB
25(OH)D3 SERPL-MCNC: 38.2 NG/ML (ref 30–100)
ALBUMIN SERPL BCP-MCNC: 4 G/DL (ref 3.5–5)
ALP SERPL-CCNC: 130 U/L (ref 46–116)
ALT SERPL W P-5'-P-CCNC: 76 U/L (ref 12–78)
ANION GAP SERPL CALCULATED.3IONS-SCNC: 6 MMOL/L (ref 4–13)
AST SERPL W P-5'-P-CCNC: 41 U/L (ref 5–45)
BASOPHILS # BLD AUTO: 0.05 THOUSANDS/ΜL (ref 0–0.1)
BASOPHILS NFR BLD AUTO: 1 % (ref 0–1)
BILIRUB SERPL-MCNC: 0.9 MG/DL (ref 0.2–1)
BUN SERPL-MCNC: 13 MG/DL (ref 5–25)
CALCIUM SERPL-MCNC: 9.4 MG/DL (ref 8.3–10.1)
CHLORIDE SERPL-SCNC: 104 MMOL/L (ref 100–108)
CHOLEST SERPL-MCNC: 207 MG/DL (ref 50–200)
CO2 SERPL-SCNC: 26 MMOL/L (ref 21–32)
CREAT SERPL-MCNC: 0.81 MG/DL (ref 0.6–1.3)
EOSINOPHIL # BLD AUTO: 0.12 THOUSAND/ΜL (ref 0–0.61)
EOSINOPHIL NFR BLD AUTO: 2 % (ref 0–6)
ERYTHROCYTE [DISTWIDTH] IN BLOOD BY AUTOMATED COUNT: 13 % (ref 11.6–15.1)
EST. AVERAGE GLUCOSE BLD GHB EST-MCNC: 103 MG/DL
FOLATE SERPL-MCNC: >20 NG/ML (ref 3.1–17.5)
GFR SERPL CREATININE-BSD FRML MDRD: 85 ML/MIN/1.73SQ M
GLUCOSE P FAST SERPL-MCNC: 84 MG/DL (ref 65–99)
HAV AB SER QL IA: REACTIVE
HBA1C MFR BLD: 5.2 %
HBV SURFACE AB SER-ACNC: 89.61 MIU/ML
HCT VFR BLD AUTO: 48.9 % (ref 34.8–46.1)
HCV AB SER QL: ABNORMAL
HDLC SERPL-MCNC: 60 MG/DL
HGB BLD-MCNC: 15.5 G/DL (ref 11.5–15.4)
IMM GRANULOCYTES # BLD AUTO: 0.03 THOUSAND/UL (ref 0–0.2)
IMM GRANULOCYTES NFR BLD AUTO: 0 % (ref 0–2)
LDLC SERPL CALC-MCNC: 136 MG/DL (ref 0–100)
LYMPHOCYTES # BLD AUTO: 2.54 THOUSANDS/ΜL (ref 0.6–4.47)
LYMPHOCYTES NFR BLD AUTO: 36 % (ref 14–44)
MCH RBC QN AUTO: 30.8 PG (ref 26.8–34.3)
MCHC RBC AUTO-ENTMCNC: 31.7 G/DL (ref 31.4–37.4)
MCV RBC AUTO: 97 FL (ref 82–98)
MONOCYTES # BLD AUTO: 0.48 THOUSAND/ΜL (ref 0.17–1.22)
MONOCYTES NFR BLD AUTO: 7 % (ref 4–12)
NEUTROPHILS # BLD AUTO: 3.88 THOUSANDS/ΜL (ref 1.85–7.62)
NEUTS SEG NFR BLD AUTO: 54 % (ref 43–75)
NONHDLC SERPL-MCNC: 147 MG/DL
NRBC BLD AUTO-RTO: 0 /100 WBCS
PLATELET # BLD AUTO: 283 THOUSANDS/UL (ref 149–390)
PMV BLD AUTO: 9.1 FL (ref 8.9–12.7)
POTASSIUM SERPL-SCNC: 4.6 MMOL/L (ref 3.5–5.3)
PROT SERPL-MCNC: 8.1 G/DL (ref 6.4–8.2)
RBC # BLD AUTO: 5.04 MILLION/UL (ref 3.81–5.12)
SODIUM SERPL-SCNC: 136 MMOL/L (ref 136–145)
TRIGL SERPL-MCNC: 57 MG/DL
TSH SERPL DL<=0.05 MIU/L-ACNC: 1.37 UIU/ML (ref 0.36–3.74)
VIT B12 SERPL-MCNC: 461 PG/ML (ref 100–900)
WBC # BLD AUTO: 7.1 THOUSAND/UL (ref 4.31–10.16)

## 2021-06-25 PROCEDURE — 86803 HEPATITIS C AB TEST: CPT

## 2021-06-25 PROCEDURE — 83036 HEMOGLOBIN GLYCOSYLATED A1C: CPT

## 2021-06-25 PROCEDURE — 36415 COLL VENOUS BLD VENIPUNCTURE: CPT

## 2021-06-25 PROCEDURE — 86708 HEPATITIS A ANTIBODY: CPT

## 2021-06-25 PROCEDURE — 87389 HIV-1 AG W/HIV-1&-2 AB AG IA: CPT

## 2021-06-25 PROCEDURE — 82746 ASSAY OF FOLIC ACID SERUM: CPT

## 2021-06-25 PROCEDURE — 80061 LIPID PANEL: CPT

## 2021-06-25 PROCEDURE — 80053 COMPREHEN METABOLIC PANEL: CPT

## 2021-06-25 PROCEDURE — 84425 ASSAY OF VITAMIN B-1: CPT

## 2021-06-25 PROCEDURE — 86706 HEP B SURFACE ANTIBODY: CPT

## 2021-06-25 PROCEDURE — 84443 ASSAY THYROID STIM HORMONE: CPT

## 2021-06-25 PROCEDURE — 82607 VITAMIN B-12: CPT

## 2021-06-25 PROCEDURE — 87522 HEPATITIS C REVRS TRNSCRPJ: CPT

## 2021-06-25 PROCEDURE — 82306 VITAMIN D 25 HYDROXY: CPT

## 2021-06-25 PROCEDURE — 85025 COMPLETE CBC W/AUTO DIFF WBC: CPT

## 2021-06-27 LAB
HCV RNA SERPL NAA+PROBE-ACNC: NORMAL IU/ML
HIV 1+2 AB+HIV1 P24 AG SERPL QL IA: NORMAL
TEST INFORMATION: NORMAL

## 2021-06-30 LAB — VIT B1 BLD-SCNC: 184.4 NMOL/L (ref 66.5–200)

## 2021-08-16 ENCOUNTER — ANESTHESIA EVENT (OUTPATIENT)
Dept: ANESTHESIOLOGY | Facility: HOSPITAL | Age: 51
End: 2021-08-16

## 2021-08-16 ENCOUNTER — ANESTHESIA (OUTPATIENT)
Dept: ANESTHESIOLOGY | Facility: HOSPITAL | Age: 51
End: 2021-08-16

## 2021-08-27 ENCOUNTER — TELEPHONE (OUTPATIENT)
Dept: GASTROENTEROLOGY | Facility: AMBULARY SURGERY CENTER | Age: 51
End: 2021-08-27

## 2021-08-27 ENCOUNTER — TELEPHONE (OUTPATIENT)
Dept: GASTROENTEROLOGY | Facility: CLINIC | Age: 51
End: 2021-08-27

## 2021-08-27 NOTE — TELEPHONE ENCOUNTER
Pt called back to reschedule procedure  Colon scheduled on 11/1 at [facility] with Dr Esteban  Patient has instructions from previous canceled procedure

## 2021-09-09 ENCOUNTER — OFFICE VISIT (OUTPATIENT)
Dept: INTERNAL MEDICINE CLINIC | Facility: CLINIC | Age: 51
End: 2021-09-09
Payer: COMMERCIAL

## 2021-09-09 VITALS
TEMPERATURE: 97.3 F | DIASTOLIC BLOOD PRESSURE: 90 MMHG | BODY MASS INDEX: 35.63 KG/M2 | WEIGHT: 193.6 LBS | SYSTOLIC BLOOD PRESSURE: 140 MMHG | HEART RATE: 83 BPM | HEIGHT: 62 IN | OXYGEN SATURATION: 96 %

## 2021-09-09 DIAGNOSIS — E03.9 HYPOTHYROIDISM, UNSPECIFIED TYPE: ICD-10-CM

## 2021-09-09 DIAGNOSIS — J30.1 NON-SEASONAL ALLERGIC RHINITIS DUE TO POLLEN: ICD-10-CM

## 2021-09-09 DIAGNOSIS — L30.9 ECZEMA, UNSPECIFIED TYPE: Primary | ICD-10-CM

## 2021-09-09 PROCEDURE — 1036F TOBACCO NON-USER: CPT | Performed by: NURSE PRACTITIONER

## 2021-09-09 PROCEDURE — 99213 OFFICE O/P EST LOW 20 MIN: CPT | Performed by: NURSE PRACTITIONER

## 2021-09-09 PROCEDURE — 3008F BODY MASS INDEX DOCD: CPT | Performed by: NURSE PRACTITIONER

## 2021-09-09 RX ORDER — MONTELUKAST SODIUM 10 MG/1
10 TABLET ORAL
Qty: 90 TABLET | Refills: 1 | Status: SHIPPED | OUTPATIENT
Start: 2021-09-09 | End: 2022-03-21 | Stop reason: SDUPTHER

## 2021-09-09 RX ORDER — MONTELUKAST SODIUM 10 MG/1
10 TABLET ORAL
Qty: 90 TABLET | Refills: 1 | Status: SHIPPED | OUTPATIENT
Start: 2021-09-09 | End: 2021-09-09 | Stop reason: SDUPTHER

## 2021-09-09 RX ORDER — LEVOTHYROXINE SODIUM 0.07 MG/1
75 TABLET ORAL DAILY
Qty: 90 TABLET | Refills: 1 | Status: SHIPPED | OUTPATIENT
Start: 2021-09-09 | End: 2022-03-21 | Stop reason: SDUPTHER

## 2021-09-09 RX ORDER — LEVOTHYROXINE SODIUM 0.07 MG/1
75 TABLET ORAL DAILY
Qty: 90 TABLET | Refills: 1 | Status: SHIPPED | OUTPATIENT
Start: 2021-09-09 | End: 2021-09-09 | Stop reason: SDUPTHER

## 2021-09-09 RX ORDER — CLOBETASOL PROPIONATE 0.5 MG/G
OINTMENT TOPICAL 2 TIMES DAILY
Qty: 30 G | Refills: 0 | Status: SHIPPED | OUTPATIENT
Start: 2021-09-09 | End: 2022-03-09 | Stop reason: SDUPTHER

## 2021-09-09 NOTE — PROGRESS NOTES
Assessment/Plan:    Non-seasonal allergic rhinitis due to pollen  Take singulair for allergy    Hypothyroidism  Refilled levothyroxine    Eczema  Try clobetasol for rash       Diagnoses and all orders for this visit:    Eczema, unspecified type  -     clobetasol (TEMOVATE) 0 05 % ointment; Apply topically 2 (two) times a day    Hypothyroidism, unspecified type  -     Discontinue: levothyroxine 75 mcg tablet; Take 1 tablet (75 mcg total) by mouth daily    Non-seasonal allergic rhinitis due to pollen  -     Discontinue: montelukast (SINGULAIR) 10 mg tablet; Take 1 tablet (10 mg total) by mouth daily at bedtime          BMI Counseling: Body mass index is 35 41 kg/m²  The BMI is above normal  Nutrition recommendations include decreasing overall calorie intake, 3-5 servings of fruits/vegetables daily and consuming healthier snacks  Exercise recommendations include exercising 3-5 times per week  Subjective:      Patient ID: Yobani Solis is a 49 y o  female  Patient is here for a regular follow up    Lost 4 pounds  Blood pressure sightly elevated    Co eczema- wants a refill of steroid cream  Needs refill of levothyroxine and singulair          The following portions of the patient's history were reviewed and updated as appropriate: allergies, current medications, past family history, past medical history, past social history, past surgical history and problem list     Review of Systems   Constitutional: Negative  HENT: Negative  Eyes: Negative  Respiratory: Negative  Cardiovascular: Negative  Gastrointestinal: Negative  Musculoskeletal: Negative  Skin: Positive for rash  Neurological: Negative  Objective:      /90   Pulse 83   Temp (!) 97 3 °F (36 3 °C) (Temporal)   Ht 5' 2" (1 575 m)   Wt 87 8 kg (193 lb 9 6 oz)   SpO2 96%   BMI 35 41 kg/m²          Physical Exam  Vitals and nursing note reviewed  Constitutional:       Appearance: She is well-developed     HENT: Head: Normocephalic and atraumatic  Right Ear: External ear normal       Left Ear: External ear normal       Nose: Nose normal    Eyes:      Conjunctiva/sclera: Conjunctivae normal       Pupils: Pupils are equal, round, and reactive to light  Cardiovascular:      Rate and Rhythm: Normal rate and regular rhythm  Pulmonary:      Effort: Pulmonary effort is normal       Breath sounds: Normal breath sounds  Abdominal:      General: Bowel sounds are normal       Palpations: Abdomen is soft  Musculoskeletal:         General: Normal range of motion  Cervical back: Normal range of motion and neck supple  Skin:     General: Skin is warm and dry  Findings: Rash present  Neurological:      Mental Status: She is alert and oriented to person, place, and time

## 2021-10-04 ENCOUNTER — HEALTH MAINTENANCE LETTER (OUTPATIENT)
Age: 51
End: 2021-10-04

## 2021-10-12 ENCOUNTER — TRANSFERRED RECORDS (OUTPATIENT)
Dept: HEALTH INFORMATION MANAGEMENT | Facility: CLINIC | Age: 51
End: 2021-10-12

## 2021-10-25 ENCOUNTER — NURSE TRIAGE (OUTPATIENT)
Dept: NURSING | Facility: CLINIC | Age: 51
End: 2021-10-25

## 2021-10-25 NOTE — TELEPHONE ENCOUNTER
Patient reports she has been trying to use OTC drops for red eyes, and they are not working. She is asking if she should go to Opthamologist.  She was advised to go to [facility].    Patient agreed to POC.    Emma Onofre RN RN  [facility] Triage/refill nurse    Reason for Disposition    Patient wants to be seen    Additional Information    Negative: Blurred vision    Negative: Cloudy spot or sore seen on the cornea (clear part of the eye)    Negative: Eyelids are very swollen (shut or almost)    Negative: Eyelid (outer) is very red    Negative: Vomiting    Negative: Foreign body sensation ('feels like something is in there')    Negative: Recent eye surgery and has increasing eye pain    Negative: Eye pain/discomfort that is more than mild    Negative: Severe eye pain    Negative: Patient sounds very sick or weak to the triager    Protocols used: EYE - RED WITHOUT PUS-A-OH

## 2021-10-29 ENCOUNTER — TELEPHONE (OUTPATIENT)
Dept: SURGERY | Facility: AMBULARY SURGERY CENTER | Age: 51
End: 2021-10-29

## 2021-11-01 ENCOUNTER — TELEPHONE (OUTPATIENT)
Dept: GASTROENTEROLOGY | Facility: AMBULARY SURGERY CENTER | Age: 51
End: 2021-11-01

## 2021-12-17 ENCOUNTER — OFFICE VISIT (OUTPATIENT)
Dept: INTERNAL MEDICINE | Facility: CLINIC | Age: 51
End: 2021-12-17
Payer: COMMERCIAL

## 2021-12-17 VITALS
HEIGHT: 66 IN | SYSTOLIC BLOOD PRESSURE: 135 MMHG | WEIGHT: 141.19 LBS | OXYGEN SATURATION: 99 % | HEART RATE: 77 BPM | DIASTOLIC BLOOD PRESSURE: 73 MMHG | BODY MASS INDEX: 22.69 KG/M2

## 2021-12-17 DIAGNOSIS — N92.1 METRORRHAGIA: ICD-10-CM

## 2021-12-17 DIAGNOSIS — I97.2 POST-MASTECTOMY LYMPHEDEMA SYNDROME: ICD-10-CM

## 2021-12-17 DIAGNOSIS — Z00.00 ROUTINE HISTORY AND PHYSICAL EXAMINATION OF ADULT: Primary | ICD-10-CM

## 2021-12-17 DIAGNOSIS — C43.9 MELANOMA OF SKIN (H): ICD-10-CM

## 2021-12-17 DIAGNOSIS — B00.9 HERPETIC LESION: ICD-10-CM

## 2021-12-17 DIAGNOSIS — D32.9 MENINGIOMA (H): ICD-10-CM

## 2021-12-17 DIAGNOSIS — C50.411 MALIGNANT NEOPLASM OF UPPER-OUTER QUADRANT OF RIGHT FEMALE BREAST, UNSPECIFIED ESTROGEN RECEPTOR STATUS (H): ICD-10-CM

## 2021-12-17 DIAGNOSIS — R20.2 PARESTHESIA OF ARM: ICD-10-CM

## 2021-12-17 PROBLEM — Z85.820 H/O MALIGNANT MELANOMA: Status: RESOLVED | Noted: 2017-08-03 | Resolved: 2021-12-17

## 2021-12-17 PROBLEM — Z85.3 HISTORY OF MALIGNANT NEOPLASM OF BREAST: Status: RESOLVED | Noted: 2017-08-03 | Resolved: 2021-12-17

## 2021-12-17 PROBLEM — K57.30 DIVERTICULAR DISEASE OF LARGE INTESTINE: Status: ACTIVE | Noted: 2020-12-07

## 2021-12-17 PROBLEM — R14.0 ABDOMINAL BLOATING: Status: RESOLVED | Noted: 2017-08-03 | Resolved: 2021-12-17

## 2021-12-17 PROBLEM — Z85.3 HISTORY OF MALIGNANT NEOPLASM OF BREAST: Status: ACTIVE | Noted: 2017-08-03

## 2021-12-17 PROBLEM — R14.0 ABDOMINAL DISTENSION, GASEOUS: Status: RESOLVED | Noted: 2017-08-03 | Resolved: 2021-12-17

## 2021-12-17 PROCEDURE — 99396 PREV VISIT EST AGE 40-64: CPT | Performed by: PEDIATRICS

## 2021-12-17 RX ORDER — VALACYCLOVIR HYDROCHLORIDE 1 G/1
TABLET, FILM COATED ORAL
Qty: 6 TABLET | Refills: 2 | Status: SHIPPED | OUTPATIENT
Start: 2021-12-17 | End: 2022-02-22

## 2021-12-17 ASSESSMENT — MIFFLIN-ST. JEOR: SCORE: 1269.23

## 2021-12-17 NOTE — ASSESSMENT & PLAN NOTE
She has a history of melanoma on the right side of her back.  She sees dermatology consultants once a year.

## 2021-12-17 NOTE — ASSESSMENT & PLAN NOTE
Stable.  This is felt to be possibly due to her right-sided lymphedema, just mild paresthesia along her wrist which could also be carpal tunnel.

## 2021-12-17 NOTE — ASSESSMENT & PLAN NOTE
This has been ongoing over the last couple of years.  Last year, no signs of anemia and she had a normal pelvic ultrasound.  Discussed with her that this is possibly perimenopausal.

## 2021-12-17 NOTE — ASSESSMENT & PLAN NOTE
She has had herpetic lesions on her lower back in the past.  She would like to have Valtrex on hand just in case she has a flareup

## 2021-12-17 NOTE — ASSESSMENT & PLAN NOTE
Hx of brain Meningioma: had yearly MRIs from 2011 until 2013 and it was 0.7cm and stable. No changes. Dr. Massey of Oncology did not feel that any further surveillance of this was needed.

## 2021-12-17 NOTE — PROGRESS NOTES
SUBJECTIVE:   CC: Corinna Kinney is an 51 year old woman who presents for preventive health visit.       Patient has been advised of split billing requirements and indicates understanding: Yes  Healthy Habits:     Getting at least 3 servings of Calcium per day:  Yes    Bi-annual eye exam:  Yes    Dental care twice a year:  Yes    Sleep apnea or symptoms of sleep apnea:  None    Diet:  Regular (no restrictions)    Frequency of exercise:  None    Taking medications regularly:  Yes    Medication side effects:  Not applicable    PHQ-2 Total Score: 0    Additional concerns today:  No    Used to get abdominal pain and bloating.  That no longer happens.    She got a colonoscopy last year.  She reports that it was fine.  She was told a 10-year follow-up.  We do not have records.    She has a history of melanoma and sees her dermatologist once a year.    She continues to have some baseline numbness and tingling in her right arm.  This is associated with her lymphedema    He continues to have the same irregular periods on and off over the last couple of years.  We did do a pelvic ultrasound last year which was normal.  Can be heavy at times    Would like to have a refill on her valacyclovir just in case she has any recurrence of herpetic lesions on her lower back    She is .  No concern for STDs.    She did have Covid before she got her vaccines.  It was a very mild case.  She is undecided about the booster and getting the flu vaccine.      The 10-year ASCVD risk score (Benson GAVIOTA Jr., et al., 2013) is: 1%    Values used to calculate the score:      Age: 51 years      Sex: Female      Is Non- : No      Diabetic: No      Tobacco smoker: No      Systolic Blood Pressure: 135 mmHg      Is BP treated: No      HDL Cholesterol: 86 mg/dL      Total Cholesterol: 233 mg/dL      Today's PHQ-2 Score:   PHQ-2 ( 1999 Pfizer) 12/17/2021   Q1: Little interest or pleasure in doing things 0   Q2: Feeling down,  depressed or hopeless 0   PHQ-2 Score 0   Q1: Little interest or pleasure in doing things Not at all   Q2: Feeling down, depressed or hopeless Not at all   PHQ-2 Score 0       Abuse: Current or Past (Physical, Sexual or Emotional) - No  Do you feel safe in your environment? Yes    Have you ever done Advance Care Planning? (For example, a Health Directive, POLST, or a discussion with a medical provider or your loved ones about your wishes): No, advance care planning information given to patient to review.  Patient plans to discuss their wishes with loved ones or provider.      Social History     Tobacco Use     Smoking status: Never Smoker     Smokeless tobacco: Never Used   Substance Use Topics     Alcohol use: Yes     Alcohol/week: 1.0 standard drink     Comment: Alcoholic Drinks/day: social         Alcohol Use 12/17/2021   Prescreen: >3 drinks/day or >7 drinks/week? No       Reviewed orders with patient.  Reviewed health maintenance and updated orders accordingly - Yes      Breast Cancer Screening:    FHS-7:   Breast CA Risk Assessment (FHS-7) 12/17/2021   Did any of your first-degree relatives have breast or ovarian cancer? No   Did any of your relatives have bilateral breast cancer? No   Did any man in your family have breast cancer? No   Did any woman in your family have breast and ovarian cancer? Yes   Did any woman in your family have breast cancer before age 50 y? Yes   Do you have 2 or more relatives with breast and/or ovarian cancer? No   Do you have 2 or more relatives with breast and/or bowel cancer? No         Pertinent mammograms are reviewed under the imaging tab.    History of abnormal Pap smear: NO - age 30-65 PAP every 5 years with negative HPV co-testing recommended  PAP / HPV Latest Ref Rng & Units 5/18/2018   PAP - Negative for squamous intraepithelial lesion or malignancy  Electronically signed by Clarissa Luong CT (ASCP) on 5/24/2018 at 10:30 AM     HPV16 NEG Negative   HPV18 NEG Negative  "  HRHPV NEG Negative     Reviewed and updated as needed this visit by clinical staff  Tobacco  Allergies  Meds    Surg Hx  Fam Hx         Reviewed and updated as needed this visit by Provider  Tobacco      Surg Hx  Fam Hx            Review of Systems  See above     OBJECTIVE:   /73   Pulse 77   Ht 1.664 m (5' 5.5\")   Wt 64 kg (141 lb 3 oz)   LMP 11/14/2021 (Exact Date)   SpO2 99%   BMI 23.14 kg/m    Physical Exam  GENERAL: healthy, alert and no distress  NECK: no adenopathy, no asymmetry, masses, or scars and thyroid normal to palpation  RESP: lungs clear to auscultation - no rales, rhonchi or wheezes  BREAST: bilateral breast reconstruction  CV: regular rate and rhythm, normal S1 S2, no S3 or S4, no murmur, click or rub, no peripheral edema and peripheral pulses strong  ABDOMEN: soft, nontender, no hepatosplenomegaly, no masses and bowel sounds normal  MS: no gross musculoskeletal defects noted, no edema      ASSESSMENT/PLAN:     Assessment & Plan   Problem List Items Addressed This Visit     R breast cancer s/p bilateral mastectomy and reconstruction-followed by oncology     Hx of R breast cancer s/p chemo. She had bilateral mastectomy with reconstruction and then also received radiation therapy to her right breast.  This was in 2011. Follows with Dr. Massey of Oncology.         Metrorrhagia     This has been ongoing over the last couple of years.  Last year, no signs of anemia and she had a normal pelvic ultrasound.  Discussed with her that this is possibly perimenopausal.         Malignant Melanoma Of The Skin     She has a history of melanoma on the right side of her back.  She sees dermatology consultants once a year.         Post-mastectomy lymphedema syndrome     Very minimal lymphedema on the right side.         Paresthesia of arm     Stable.  This is felt to be possibly due to her right-sided lymphedema, just mild paresthesia along her wrist which could also be carpal tunnel.         " "Meningioma (H)     Hx of brain Meningioma: had yearly MRIs from 2011 until 2013 and it was 0.7cm and stable. No changes. Dr. Massey of Oncology did not feel that any further surveillance of this was needed.           Herpetic lesion     She has had herpetic lesions on her lower back in the past.  She would like to have Valtrex on hand just in case she has a flareup         Relevant Medications    valACYclovir (VALTREX) 1000 mg tablet      Other Visit Diagnoses     Routine history and physical examination of adult    -  Primary    Relevant Orders    Lipid panel reflex to direct LDL Fasting         MIYA signed for colonoscopy done last year.  She reports that it was normal with a recommendation for 10-year follow-up.       Return in about 1 year (around 12/17/2022) for Follow up.    Alvin Luciaon MD  [facility]    Patient has been advised of split billing requirements and indicates understanding: per MA/  COUNSELING:  Reviewed preventive health counseling, as reflected in patient instructions    Estimated body mass index is 23.14 kg/m  as calculated from the following:    Height as of this encounter: 1.664 m (5' 5.5\").    Weight as of this encounter: 64 kg (141 lb 3 oz).        She reports that she has never smoked. She has never used smokeless tobacco.      Counseling Resources:  ATP IV Guidelines  Pooled Cohorts Equation Calculator  Breast Cancer Risk Calculator  BRCA-Related Cancer Risk Assessment: FHS-7 Tool  FRAX Risk Assessment  ICSI Preventive Guidelines  Dietary Guidelines for Americans, 2010  USDA's MyPlate  ASA Prophylaxis  Lung CA Screening    Alvin Luciano MD  [facility]  "

## 2021-12-17 NOTE — PATIENT INSTRUCTIONS
Go to the lab today. We will notify you with the results once those are available.      GENERAL INFORMATION AND HELPFUL NUMBERS:    If labs were ordered today, please go to the outpatient lab located in the same building. Once the results are back, we will notify you with results.    If imaging was ordered to be done today, please go to [facility] (located in the same building across our [facility]). Once the results are back, we will notify you with results.    If imaging was ordered to be done in the future at an [facility], someone will call to schedule otherwise you may also call [phone] to schedule.    If a specialty referral was placed during today's visit, someone will call to schedule unless you were instructed to call yourself. If you are having issues with this, please message me through [employer] or call our clinic at [phone] (press option 2 to speak with someone from our [facility] team).    If a mammogram was ordered during today's visit, someone will call to schedule otherwise you may also call [phone] to schedule     If a heart ultrasound or stress test was ordered today, someone will call to schedule otherwise you may also call the main Cardiology clinic at [phone] to schedule.    If a bone density scan was ordered today, someone will call to schedule otherwise you may also call [phone] to schedule.    If you have any questions or concerns after our visit today, please message me through [employer] or call our clinic at [phone] (press option 2 to speak with someone from our [facility] team).

## 2021-12-17 NOTE — ASSESSMENT & PLAN NOTE
Hx of R breast cancer s/p chemo. She had bilateral mastectomy with reconstruction and then also received radiation therapy to her right breast.  This was in 2011. Follows with Dr. Massey of Oncology.

## 2021-12-20 ENCOUNTER — TELEPHONE (OUTPATIENT)
Dept: ONCOLOGY | Facility: HOSPITAL | Age: 51
End: 2021-12-20
Payer: COMMERCIAL

## 2021-12-20 NOTE — TELEPHONE ENCOUNTER
----- Message from Fatimah Marie sent at 12/17/2021  7:40 AM CST -----  Regarding: Due 1 yr followup  Pt is due for her 1 yr followup with labs and MD. I believe she hasn't been notified yet, please call to schedule.    Thanks,  Fatimah  ----- Message -----  From: SYSTEM  Sent: 12/16/2021  12:23 AM CST  To: [facility] Support Pool

## 2021-12-20 NOTE — LETTER
Corinna Kinney  [address] [facility] [address]          December 20, 2021    Dear Corinna:    Our clinic records indicate we have attempted to contact you to schedule a follow up appointment with Dr. Massey.   Unfortunately, we have been unable to reach you. To prevent further delays in your care, please contact our office at [phone] to schedule your appointment.      Sincerely,     [facility]

## 2021-12-29 ENCOUNTER — IMMUNIZATION (OUTPATIENT)
Dept: NURSING | Facility: CLINIC | Age: 51
End: 2021-12-29
Payer: COMMERCIAL

## 2021-12-29 PROCEDURE — 91300 PR COVID VAC PFIZER DIL RECON 30 MCG/0.3 ML IM: CPT

## 2021-12-29 PROCEDURE — 0004A PR COVID VAC PFIZER DIL RECON 30 MCG/0.3 ML IM: CPT

## 2022-01-20 ENCOUNTER — LAB (OUTPATIENT)
Dept: INFUSION THERAPY | Facility: HOSPITAL | Age: 52
End: 2022-01-20
Attending: INTERNAL MEDICINE
Payer: COMMERCIAL

## 2022-01-20 ENCOUNTER — ONCOLOGY VISIT (OUTPATIENT)
Dept: ONCOLOGY | Facility: HOSPITAL | Age: 52
End: 2022-01-20
Attending: INTERNAL MEDICINE
Payer: COMMERCIAL

## 2022-01-20 VITALS
BODY MASS INDEX: 22.53 KG/M2 | HEART RATE: 67 BPM | OXYGEN SATURATION: 99 % | DIASTOLIC BLOOD PRESSURE: 73 MMHG | TEMPERATURE: 98.6 F | HEIGHT: 66 IN | RESPIRATION RATE: 16 BRPM | WEIGHT: 140.2 LBS | SYSTOLIC BLOOD PRESSURE: 138 MMHG

## 2022-01-20 DIAGNOSIS — Z17.1 MALIGNANT NEOPLASM OF UPPER-OUTER QUADRANT OF RIGHT BREAST IN FEMALE, ESTROGEN RECEPTOR NEGATIVE (H): ICD-10-CM

## 2022-01-20 DIAGNOSIS — C50.411 MALIGNANT NEOPLASM OF UPPER-OUTER QUADRANT OF RIGHT BREAST IN FEMALE, ESTROGEN RECEPTOR NEGATIVE (H): ICD-10-CM

## 2022-01-20 DIAGNOSIS — Z85.820 HISTORY OF MELANOMA: ICD-10-CM

## 2022-01-20 LAB
ALBUMIN SERPL-MCNC: 4.4 G/DL (ref 3.5–5)
ALP SERPL-CCNC: 57 U/L (ref 45–120)
ALT SERPL W P-5'-P-CCNC: 23 U/L (ref 0–45)
ANION GAP SERPL CALCULATED.3IONS-SCNC: 8 MMOL/L (ref 5–18)
AST SERPL W P-5'-P-CCNC: 20 U/L (ref 0–40)
BILIRUB SERPL-MCNC: 0.7 MG/DL (ref 0–1)
BUN SERPL-MCNC: 15 MG/DL (ref 8–22)
CALCIUM SERPL-MCNC: 9.5 MG/DL (ref 8.5–10.5)
CHLORIDE BLD-SCNC: 104 MMOL/L (ref 98–107)
CO2 SERPL-SCNC: 28 MMOL/L (ref 22–31)
CREAT SERPL-MCNC: 0.77 MG/DL (ref 0.6–1.1)
GFR SERPL CREATININE-BSD FRML MDRD: >90 ML/MIN/1.73M2
GLUCOSE BLD-MCNC: 81 MG/DL (ref 70–125)
LDH SERPL L TO P-CCNC: 160 U/L (ref 125–220)
POTASSIUM BLD-SCNC: 4.7 MMOL/L (ref 3.5–5)
PROT SERPL-MCNC: 7.6 G/DL (ref 6–8)
SODIUM SERPL-SCNC: 140 MMOL/L (ref 136–145)

## 2022-01-20 PROCEDURE — 99214 OFFICE O/P EST MOD 30 MIN: CPT | Performed by: INTERNAL MEDICINE

## 2022-01-20 PROCEDURE — 36415 COLL VENOUS BLD VENIPUNCTURE: CPT | Performed by: INTERNAL MEDICINE

## 2022-01-20 PROCEDURE — 83615 LACTATE (LD) (LDH) ENZYME: CPT | Performed by: INTERNAL MEDICINE

## 2022-01-20 PROCEDURE — G0463 HOSPITAL OUTPT CLINIC VISIT: HCPCS

## 2022-01-20 PROCEDURE — 80053 COMPREHEN METABOLIC PANEL: CPT | Performed by: INTERNAL MEDICINE

## 2022-01-20 ASSESSMENT — MIFFLIN-ST. JEOR: SCORE: 1264.75

## 2022-01-20 ASSESSMENT — PAIN SCALES - GENERAL: PAINLEVEL: NO PAIN (0)

## 2022-01-20 NOTE — LETTER
"    1/20/2022         RE: Corinna Kinney  [address]  [place] [facility] [place] [address]        Dear Colleague,    Thank you for referring your patient, Corinna Kinney, to the [facility]. Please see a copy of my visit note below.    Oncology Rooming Note    January 20, 2022 9:36 AM   Corinna Kinney is a 51 year old female who presents for:    Chief Complaint   Patient presents with     Oncology Clinic Visit     Initial Vitals: /73 (BP Location: Left arm, Cuff Size: Adult Regular)   Pulse 67   Temp 98.6  F (37  C) (Oral)   Resp 16   Ht 1.664 m (5' 5.5\")   Wt 63.6 kg (140 lb 3.2 oz)   SpO2 99%   BMI 22.98 kg/m   Estimated body mass index is 22.98 kg/m  as calculated from the following:    Height as of this encounter: 1.664 m (5' 5.5\").    Weight as of this encounter: 63.6 kg (140 lb 3.2 oz). Body surface area is 1.71 meters squared.  No Pain (0) Comment: Data Unavailable   No LMP recorded.  Allergies reviewed: Yes  Medications reviewed: Yes    Medications: Medication refills not needed today.  Pharmacy name entered into [employer]:    [facility] #[id number] - [address] AT [facility] [address]  [facility] [address] [place] [address]    Clinical concerns: No concerns today.    Ivett Cui                [facility] cancer Care Progress Note  Patient: Corinna Kinney   MRN:  [id number]   Date of Service : Jan 20, 2022        Reason for visit      1. Malignant neoplasm of upper-outer quadrant of right breast in female, estrogen receptor negative (H)        Assessment     1. CA breast right-sided T2 N1 M0 ER/SC negative HER-2/tiffani 3+ status post neoadjuvant chemotherapy with carboplatin and Taxotere and Herceptin which led to complete pathological response. She had bilateral mastectomy with reconstruction and then also received radiation therapy to her right breast.  This was in 2011.  2. Stage IA malignant " melanoma from her right side of her back removed in 2012.  She follows up with [facility] dermatology.  No evidence of any recurrence.  3. Negative genetic testing.  4. Good general health otherwise.   5.  Fully COVID vaccinated.    Plan     1.  At this time follow-up in 12 months.  Also discussed with her that she can follow-up with her regular doctor if she so chooses to do.  She should also see her plastic surgeon since it has been 10 years since her surgery.  There might be some issues with the implant that may need to be addressed.  2.  Follow-up with a dermatologist for full body dermatological checkup.  3.  Follow-up with primary care for other medical issues.  4.  Continue with good diet and exercise.    Clinical stage      Cancer Staging  Breast cancer of upper-outer quadrant of right female breast (H)  Staging form: Breast, AJCC 7th Edition  - Clinical stage from 6/22/2011: Stage IIB (T2, N1, cM0, Free text: ER-,GA-, Vdi8ync 3+) - Signed by Nicolas Massey MD on 1/25/2016  - Pathologic: No stage assigned - Unsigned    Malignant Melanoma Of The Skin  Staging form: Melanoma of the Skin, AJCC 7th Edition  - Clinical: Stage IA (T1a, N0, M0) - Signed by Nicolas Massey MD on 1/25/2016  - Pathologic: No stage assigned - Unsigned      History     Corinna Kinney is a very pleasant 51 year old  female with a history of stage II invasive ductal carcinoma diagnosed in October 2011 located on her right breast presenting with the yellowish nipple discharge and workup revealed that she had a mass measuring over 2 cm in size with right axillary lymphadenopathy. She underwent biopsy of pelvic mass as well as a lymph node which showed high-grade invasive ductal carcinoma ER/GA negative HER-2/tiffani 3+. She was treated with neoadjuvant carboplatin and Taxotere and Herceptin which resulted in complete pathological response. She then underwent bilateral mastectomy with reconstruction. She was also treated  with radiation therapy to the right breast. She went on to finish one year of Herceptin. In October 2012 she was diagnosed to have melanoma located on her back which was 0.75 mm deep Deyvi's level IV with negative lymph nodes on reexcision. She has been followed up with observation for that. She did have a PET scan after the melanoma was diagnosed and that was negative.    She continues to follow with the dermatologist.  Gets a full body checkup at least once a year.   She did undergo a little bit more of genetic testing at [facility] and that also came back negative.      She did have COVID-19 back in November 2020.  Was sick for about 10 days.  Quarantine for about 2 weeks.  Now feels great.  Was last seen at [facility] in June 2020.  She also underwent colonoscopy in November 2020.    Comes in today for scheduled follow-up.  Overall doing quite well.  No new medical issues since the last time she was seen here.    Past Medical History     Past Medical History:   Diagnosis Date     Backache     Created by Conversion      Benign Pigmented Nevus     Created by Conversion      Breast cancer (H)      Breast Neoplasm     Created by Conversion [facility] Annotation: Oct 21 2011  2:28PM - Beata Callahan: intraductal,  +HER2 receptors, -ER/PRtreated with carboplatin, taxotere and herceptin      Lentigo     Created by Conversion      Lymphedema     Created by Conversion      Malignant Melanoma Of The Skin     Created by Conversion      Metrorrhagia     Created by Conversion            Review of Systems   Constitutional  Constitutional (WDL): Exceptions to WDL  Neurosensory  Neurosensory (WDL): All neurosensory elements are within defined limits  Cardiovascular  Cardiovascular (WDL): All cardiovascular elements are within defined limits  Pulmonary  Respiratory (WDL): Within Defined Limits  Gastrointestinal  Gastrointestinal (WDL): All gastrointestinal elements are within defined  "limits  Genitourinary  Genitourinary (WDL): All genitourinary elements are within defined limits  Integumentary  Integumentary (WDL): All integumentary elements are within defined limits  Patient Coping  Patient Coping: Accepting  Accompanied by  Accompanied by: Alone    ECOG performance status and Distress Assessment      ECOG Performance:    ECOG Performance Status: 0    Distress Assessment  Distress Assessment Score: No distress:     Pain Status  Currently in Pain: No/denies      Vital Signs     /73 (BP Location: Left arm, Cuff Size: Adult Regular)   Pulse 67   Temp 98.6  F (37  C) (Oral)   Resp 16   Ht 1.664 m (5' 5.5\")   Wt 63.6 kg (140 lb 3.2 oz)   SpO2 99%   BMI 22.98 kg/m       Physical Exam     GENERAL: No acute distress. Cooperative in conversation.   HEENT: Pupils are equal, round and reactive. Oral mucosa is clean and intact. No ulcerations or mucositis noted. No bleeding noted.  RESP:Chest symmetric lungs are clear bilaterally per auscultation. Regular respiratory rate. No wheezes or rhonchi.  CV: Normal S1 S2 Regular, rate and rhythm. No murmurs.  ABD: Nondistended, soft, nontender. Positive bowel sounds. No organomegaly.   EXTREMITIES: No lower extremity edema.   NEURO: Non- focal. Alert and oriented x3.  Cranial nerves appear intact.  PSYCH: Within normal limits. No depression or anxiety.  SKIN: Warm dry intact.    LYMPH NODES: Bilateral cervical, supraclavicular, axillary lymph node examination was done.  Negative for any palpable adenopathy.  BREAST: Status post bilateral mastectomy.  Good reconstruction results.  No evidence of any recurrence.  All the above elements of physical exam were performed and verified on today's visit.    Lab Results     No results found for this or any previous visit (from the past 240 hour(s)).    Imaging Results     No results found.       Nicolas Massey MD           Again, thank you for allowing me to participate in the care of your patient.  "       Sincerely,        Nicolas Massey MD, MD

## 2022-01-20 NOTE — PROGRESS NOTES
[facility] cancer Care Progress Note  Patient: Corinna Kinney   MRN:  [id number]   Date of Service : Jan 20, 2022        Reason for visit      1. Malignant neoplasm of upper-outer quadrant of right breast in female, estrogen receptor negative (H)        Assessment     1. CA breast right-sided T2 N1 M0 ER/WA negative HER-2/tiffani 3+ status post neoadjuvant chemotherapy with carboplatin and Taxotere and Herceptin which led to complete pathological response. She had bilateral mastectomy with reconstruction and then also received radiation therapy to her right breast.  This was in 2011.  2. Stage IA malignant melanoma from her right side of her back removed in 2012.  She follows up with [facility] dermatology.  No evidence of any recurrence.  3. Negative genetic testing.  4. Good general health otherwise.   5.  Fully COVID vaccinated.    Plan     1.  At this time follow-up in 12 months.  Also discussed with her that she can follow-up with her regular doctor if she so chooses to do.  She should also see her plastic surgeon since it has been 10 years since her surgery.  There might be some issues with the implant that may need to be addressed.  2.  Follow-up with a dermatologist for full body dermatological checkup.  3.  Follow-up with primary care for other medical issues.  4.  Continue with good diet and exercise.    Clinical stage      Cancer Staging  Breast cancer of upper-outer quadrant of right female breast (H)  Staging form: Breast, AJCC 7th Edition  - Clinical stage from 6/22/2011: Stage IIB (T2, N1, cM0, Free text: ER-,WA-, Mqz9ylg 3+) - Signed by Nicolas Massey MD on 1/25/2016  - Pathologic: No stage assigned - Unsigned    Malignant Melanoma Of The Skin  Staging form: Melanoma of the Skin, AJCC 7th Edition  - Clinical: Stage IA (T1a, N0, M0) - Signed by Nicolas Massey MD on 1/25/2016  - Pathologic: No stage assigned - Unsigned      History     Corinna Kinney is a very pleasant 51  year old  female with a history of stage II invasive ductal carcinoma diagnosed in October 2011 located on her right breast presenting with the yellowish nipple discharge and workup revealed that she had a mass measuring over 2 cm in size with right axillary lymphadenopathy. She underwent biopsy of pelvic mass as well as a lymph node which showed high-grade invasive ductal carcinoma ER/NV negative HER-2/tiffani 3+. She was treated with neoadjuvant carboplatin and Taxotere and Herceptin which resulted in complete pathological response. She then underwent bilateral mastectomy with reconstruction. She was also treated with radiation therapy to the right breast. She went on to finish one year of Herceptin. In October 2012 she was diagnosed to have melanoma located on her back which was 0.75 mm deep Deyvi's level IV with negative lymph nodes on reexcision. She has been followed up with observation for that. She did have a PET scan after the melanoma was diagnosed and that was negative.    She continues to follow with the dermatologist.  Gets a full body checkup at least once a year.   She did undergo a little bit more of genetic testing at [facility] and that also came back negative.      She did have COVID-19 back in November 2020.  Was sick for about 10 days.  Quarantine for about 2 weeks.  Now feels great.  Was [facility] seen [facility].  She also underwent colonoscopy in November 2020.    Comes in today for scheduled follow-up.  Overall doing quite well.  No new medical issues since the last time she was seen here.    Past Medical History     Past Medical History:   Diagnosis Date     Backache     Created by Conversion      Benign Pigmented Nevus     Created by Conversion      Breast cancer (H)      Breast Neoplasm     Created by Conversion [facility] Annotation: Oct 21 2011  2:28PM - Beata Callahan: intraductal,  +HER2 receptors, -ER/PRtreated with carboplatin, taxotere and herceptin   "    Lentigo     Created by Conversion      Lymphedema     Created by Conversion      Malignant Melanoma Of The Skin     Created by Conversion      Metrorrhagia     Created by Conversion            Review of Systems   Constitutional  Constitutional (WDL): Exceptions to WDL  Neurosensory  Neurosensory (WDL): All neurosensory elements are within defined limits  Cardiovascular  Cardiovascular (WDL): All cardiovascular elements are within defined limits  Pulmonary  Respiratory (WDL): Within Defined Limits  Gastrointestinal  Gastrointestinal (WDL): All gastrointestinal elements are within defined limits  Genitourinary  Genitourinary (WDL): All genitourinary elements are within defined limits  Integumentary  Integumentary (WDL): All integumentary elements are within defined limits  Patient Coping  Patient Coping: Accepting  Accompanied by  Accompanied by: Alone    ECOG performance status and Distress Assessment      ECOG Performance:    ECOG Performance Status: 0    Distress Assessment  Distress Assessment Score: No distress:     Pain Status  Currently in Pain: No/denies      Vital Signs     /73 (BP Location: Left arm, Cuff Size: Adult Regular)   Pulse 67   Temp 98.6  F (37  C) (Oral)   Resp 16   Ht 1.664 m (5' 5.5\")   Wt 63.6 kg (140 lb 3.2 oz)   SpO2 99%   BMI 22.98 kg/m       Physical Exam     GENERAL: No acute distress. Cooperative in conversation.   HEENT: Pupils are equal, round and reactive. Oral mucosa is clean and intact. No ulcerations or mucositis noted. No bleeding noted.  RESP:Chest symmetric lungs are clear bilaterally per auscultation. Regular respiratory rate. No wheezes or rhonchi.  CV: Normal S1 S2 Regular, rate and rhythm. No murmurs.  ABD: Nondistended, soft, nontender. Positive bowel sounds. No organomegaly.   EXTREMITIES: No lower extremity edema.   NEURO: Non- focal. Alert and oriented x3.  Cranial nerves appear intact.  PSYCH: Within normal limits. No depression or anxiety.  SKIN: Warm " dry intact.    LYMPH NODES: Bilateral cervical, supraclavicular, axillary lymph node examination was done.  Negative for any palpable adenopathy.  BREAST: Status post bilateral mastectomy.  Good reconstruction results.  No evidence of any recurrence.  All the above elements of physical exam were performed and verified on today's visit.    Lab Results     No results found for this or any previous visit (from the past 240 hour(s)).    Imaging Results     No results found.       Nicolas Massey MD

## 2022-01-20 NOTE — PROGRESS NOTES
"Oncology Rooming Note    January 20, 2022 9:36 AM   Corinna Kinney is a 51 year old female who presents for:    Chief Complaint   Patient presents with     Oncology Clinic Visit     Initial Vitals: /73 (BP Location: Left arm, Cuff Size: Adult Regular)   Pulse 67   Temp 98.6  F (37  C) (Oral)   Resp 16   Ht 1.664 m (5' 5.5\")   Wt 63.6 kg (140 lb 3.2 oz)   SpO2 99%   BMI 22.98 kg/m   Estimated body mass index is 22.98 kg/m  as calculated from the following:    Height as of this encounter: 1.664 m (5' 5.5\").    Weight as of this encounter: 63.6 kg (140 lb 3.2 oz). Body surface area is 1.71 meters squared.  No Pain (0) Comment: Data Unavailable   No LMP recorded.  Allergies reviewed: Yes  Medications reviewed: Yes    Medications: Medication refills not needed today.  Pharmacy name entered into [facility]:    [id number] - [place] [id number] [address] [facility] [address]  [place] [id number] [place] [address]    Clinical concerns: No concerns today.    Ivett Cui              "

## 2022-02-21 DIAGNOSIS — B00.9 HERPETIC LESION: ICD-10-CM

## 2022-02-22 RX ORDER — VALACYCLOVIR HYDROCHLORIDE 1 G/1
TABLET, FILM COATED ORAL
Qty: 6 TABLET | Refills: 5 | Status: SHIPPED | OUTPATIENT
Start: 2022-02-22

## 2022-02-22 NOTE — TELEPHONE ENCOUNTER
"Last Written Prescription Date:  12/17/21  Last Fill Quantity: 6,  # refills: 2   Last office visit provider:  12/17/21     Requested Prescriptions   Pending Prescriptions Disp Refills     valACYclovir (VALTREX) 1000 mg tablet [Pharmacy Med Name: VALACYCLOVIR 1GM TABLETS] 6 tablet 2     Sig: TAKE 1 TABLET BY MOUTH TWICE DAILY FOR 3 DAYS AT ONSET OF RASH.       Antivirals for Herpes Protocol Passed - 2/22/2022  9:40 AM        Passed - Patient is age 12 or older        Passed - Recent (12 mo) or future (30 days) visit within the authorizing provider's specialty     Patient has had an office visit with the authorizing provider or a provider within the authorizing providers department within the previous 12 mos or has a future within next 30 days. See \"Patient Info\" tab in inbasket, or \"Choose Columns\" in Meds & Orders section of the refill encounter.              Passed - Medication is active on med list        Passed - Normal serum creatinine on file in past 12 months     Recent Labs   Lab Test 01/20/22  0915   CR 0.77       Ok to refill medication if creatinine is low               Satya Verma RN 02/22/22 9:40 AM  "

## 2022-03-09 ENCOUNTER — TELEPHONE (OUTPATIENT)
Dept: ADMINISTRATIVE | Facility: OTHER | Age: 52
End: 2022-03-09

## 2022-03-09 DIAGNOSIS — L30.9 ECZEMA, UNSPECIFIED TYPE: ICD-10-CM

## 2022-03-09 RX ORDER — CLOBETASOL PROPIONATE 0.5 MG/G
OINTMENT TOPICAL 2 TIMES DAILY
Qty: 30 G | Refills: 0 | Status: SHIPPED | OUTPATIENT
Start: 2022-03-09 | End: 2022-03-29

## 2022-03-09 NOTE — TELEPHONE ENCOUNTER
Upon review of the In Basket request we were able to locate, review, and update the patient chart as requested for Pap Smear (HPV) aka Cervical Cancer Screening  Any additional questions or concerns should be emailed to the Practice Liaisons via [email or web address] email, please do not reply via In Basket      Thank you  Miriam Hussein

## 2022-03-09 NOTE — TELEPHONE ENCOUNTER
----- Message from Janki Benson sent at 3/8/2022 12:02 PM EST -----  Regarding: cervical cancer screening    Hello, our patient attached above has had Pap Smear (HPV) aka Cervical Cancer Screening completed/performed  Please assist in updating the patient chart by pulling the Care Everywhere (CE) document  The date of service is 11/24/220       Thank you,  vic

## 2022-03-17 ENCOUNTER — TELEPHONE (OUTPATIENT)
Dept: INTERNAL MEDICINE CLINIC | Facility: CLINIC | Age: 52
End: 2022-03-17

## 2022-03-17 NOTE — TELEPHONE ENCOUNTER
Patient called in needs an order to have her yearly Mammogram done? Call patient once completed to she can call scheduling

## 2022-03-17 NOTE — TELEPHONE ENCOUNTER
The patient has a yeast infection she has had it for 2 weeks  She is itchy and has vaginal discharge  The patient does not have a fever or the chills  Her ob/gyn will not see her until June  Dr Ivonne Feliz is on medical leave until then  No one else in the office will see her  How should the patient proceed? Please advise  Thank you

## 2022-03-21 ENCOUNTER — OFFICE VISIT (OUTPATIENT)
Dept: INTERNAL MEDICINE CLINIC | Facility: CLINIC | Age: 52
End: 2022-03-21
Payer: MEDICARE

## 2022-03-21 VITALS
OXYGEN SATURATION: 96 % | SYSTOLIC BLOOD PRESSURE: 152 MMHG | DIASTOLIC BLOOD PRESSURE: 82 MMHG | HEART RATE: 82 BPM | WEIGHT: 189.4 LBS | BODY MASS INDEX: 34.85 KG/M2 | HEIGHT: 62 IN

## 2022-03-21 DIAGNOSIS — J30.1 NON-SEASONAL ALLERGIC RHINITIS DUE TO POLLEN: ICD-10-CM

## 2022-03-21 DIAGNOSIS — E03.9 HYPOTHYROIDISM, UNSPECIFIED TYPE: ICD-10-CM

## 2022-03-21 DIAGNOSIS — H60.542 ACUTE ECZEMATOID OTITIS EXTERNA OF LEFT EAR: ICD-10-CM

## 2022-03-21 DIAGNOSIS — B37.9 YEAST INFECTION: Primary | ICD-10-CM

## 2022-03-21 PROCEDURE — 99213 OFFICE O/P EST LOW 20 MIN: CPT | Performed by: NURSE PRACTITIONER

## 2022-03-21 RX ORDER — FLUCONAZOLE 150 MG/1
TABLET ORAL
Qty: 2 TABLET | Refills: 0 | Status: SHIPPED | OUTPATIENT
Start: 2022-03-21 | End: 2022-03-23

## 2022-03-21 RX ORDER — LEVOTHYROXINE SODIUM 0.07 MG/1
75 TABLET ORAL DAILY
Qty: 90 TABLET | Refills: 1 | Status: SHIPPED | OUTPATIENT
Start: 2022-03-21

## 2022-03-21 RX ORDER — MONTELUKAST SODIUM 10 MG/1
10 TABLET ORAL
Qty: 90 TABLET | Refills: 1 | Status: SHIPPED | OUTPATIENT
Start: 2022-03-21

## 2022-03-21 NOTE — PROGRESS NOTES
Assessment/Plan:    Yeast infection  Start diflucan  Avoid sugar    Acute eczematoid otitis externa of left ear  Start ear drops       Diagnoses and all orders for this visit:    Yeast infection  -     fluconazole (DIFLUCAN) 150 mg tablet; Take one now and another after 48 hours    Hypothyroidism, unspecified type  -     levothyroxine 75 mcg tablet; Take 1 tablet (75 mcg total) by mouth daily  -     Discontinue: neomycin-polymyxin-hydrocortisone (CORTISPORIN) otic solution; Administer 4 drops into both ears every 6 (six) hours    Non-seasonal allergic rhinitis due to pollen  -     montelukast (SINGULAIR) 10 mg tablet; Take 1 tablet (10 mg total) by mouth daily at bedtime    Acute eczematoid otitis externa of left ear  -     neomycin-polymyxin-hydrocortisone (CORTISPORIN) otic solution; Administer 4 drops into both ears every 6 (six) hours          Subjective:      Patient ID: Trinity Phan is a 49 y o  female  Patient is here for 2 weeks of vaginal itch with discharge  No odor  No frequency of urine  No blood    She also co itch and dryness of left ear    Vaginal Discharge  The patient's primary symptoms include genital itching and vaginal discharge  This is a new problem  The current episode started 1 to 4 weeks ago  The problem occurs constantly  The problem has been unchanged  The patient is experiencing no pain  The following portions of the patient's history were reviewed and updated as appropriate: allergies, current medications, past family history, past medical history, past social history, past surgical history and problem list     Review of Systems   Constitutional: Negative  HENT: Negative  Eyes: Negative  Respiratory: Negative  Cardiovascular: Negative  Gastrointestinal: Negative  Genitourinary: Positive for vaginal discharge  Musculoskeletal: Negative  Neurological: Negative            Objective:      /82   Pulse 82   Ht 5' 2" (1 575 m)   Wt 85 9 kg (189 lb 6 4 oz)   SpO2 96%   BMI 34 64 kg/m²          Physical Exam  Vitals and nursing note reviewed  Constitutional:       Appearance: She is well-developed  HENT:      Head: Normocephalic and atraumatic  Right Ear: External ear normal       Left Ear: External ear normal       Ears:        Nose: Nose normal    Eyes:      Conjunctiva/sclera: Conjunctivae normal       Pupils: Pupils are equal, round, and reactive to light  Cardiovascular:      Rate and Rhythm: Normal rate and regular rhythm  Pulmonary:      Effort: Pulmonary effort is normal       Breath sounds: Normal breath sounds  Abdominal:      General: Bowel sounds are normal       Palpations: Abdomen is soft  Musculoskeletal:         General: Normal range of motion  Cervical back: Normal range of motion and neck supple  Skin:     General: Skin is warm and dry  Neurological:      Mental Status: She is alert and oriented to person, place, and time

## 2022-03-25 ENCOUNTER — HOSPITAL ENCOUNTER (OUTPATIENT)
Dept: MAMMOGRAPHY | Facility: HOSPITAL | Age: 52
Discharge: HOME | End: 2022-03-25
Payer: MEDICARE

## 2022-03-25 DIAGNOSIS — Z12.31 ENCOUNTER FOR SCREENING MAMMOGRAM FOR MALIGNANT NEOPLASM OF BREAST: ICD-10-CM

## 2022-03-25 PROCEDURE — 77067 SCR MAMMO BI INCL CAD: CPT

## 2022-03-25 PROCEDURE — 77063 BREAST TOMOSYNTHESIS BI: CPT

## 2022-03-29 DIAGNOSIS — L30.9 ECZEMA, UNSPECIFIED TYPE: ICD-10-CM

## 2022-03-29 RX ORDER — CLOBETASOL PROPIONATE 0.5 MG/G
OINTMENT TOPICAL
Qty: 30 G | Refills: 0 | Status: SHIPPED | OUTPATIENT
Start: 2022-03-29 | End: 2022-04-19

## 2022-04-19 DIAGNOSIS — L30.9 ECZEMA, UNSPECIFIED TYPE: ICD-10-CM

## 2022-04-19 RX ORDER — CLOBETASOL PROPIONATE 0.5 MG/G
OINTMENT TOPICAL
Qty: 30 G | Refills: 0 | Status: SHIPPED | OUTPATIENT
Start: 2022-04-19 | End: 2022-05-24

## 2022-04-26 ENCOUNTER — HOSPITAL ENCOUNTER (OUTPATIENT)
Dept: ULTRASOUND IMAGING | Facility: CLINIC | Age: 52
Discharge: HOME | End: 2022-04-26
Payer: MEDICARE

## 2022-04-26 ENCOUNTER — HOSPITAL ENCOUNTER (OUTPATIENT)
Dept: MAMMOGRAPHY | Facility: CLINIC | Age: 52
Discharge: HOME | End: 2022-04-26
Payer: MEDICARE

## 2022-04-26 VITALS — BODY MASS INDEX: 34.85 KG/M2 | HEIGHT: 62 IN | WEIGHT: 189.38 LBS

## 2022-04-26 DIAGNOSIS — R92.8 ABNORMAL MAMMOGRAM: ICD-10-CM

## 2022-04-26 PROCEDURE — 77066 DX MAMMO INCL CAD BI: CPT

## 2022-04-26 PROCEDURE — 76642 ULTRASOUND BREAST LIMITED: CPT

## 2022-04-26 RX ADMIN — IOHEXOL 100 ML: 350 INJECTION, SOLUTION INTRAVENOUS at 09:48

## 2022-05-02 ENCOUNTER — OFFICE VISIT (OUTPATIENT)
Dept: PODIATRY | Facility: CLINIC | Age: 52
End: 2022-05-02
Payer: MEDICARE

## 2022-05-02 VITALS
HEIGHT: 62 IN | BODY MASS INDEX: 34.34 KG/M2 | SYSTOLIC BLOOD PRESSURE: 166 MMHG | HEART RATE: 68 BPM | WEIGHT: 186.6 LBS | DIASTOLIC BLOOD PRESSURE: 100 MMHG

## 2022-05-02 DIAGNOSIS — B35.3 TINEA PEDIS OF RIGHT FOOT: Primary | ICD-10-CM

## 2022-05-02 PROCEDURE — 99202 OFFICE O/P NEW SF 15 MIN: CPT | Performed by: PODIATRIST

## 2022-05-02 RX ORDER — KETOCONAZOLE 20 MG/G
CREAM TOPICAL DAILY
Qty: 15 G | Refills: 0 | Status: SHIPPED | OUTPATIENT
Start: 2022-05-02

## 2022-05-02 RX ORDER — CLONAZEPAM 2 MG/1
TABLET ORAL
COMMUNITY
Start: 2022-04-19

## 2022-05-02 RX ORDER — CARIPRAZINE 3 MG/1
3 CAPSULE, GELATIN COATED ORAL DAILY
COMMUNITY
Start: 2022-04-19

## 2022-05-02 NOTE — PROGRESS NOTES
Podiatry Clinic Visit  Leda Rizo 47 y o  female MRN: [id number]  Encounter: [id number]    Assessment/Plan        Diagnoses and all orders for this visit:    Tinea pedis of right foot  -     ketoconazole (NIZORAL) 2 % cream; Apply topically daily    Other orders  -     clonazePAM (KlonoPIN) 2 mg tablet; TAKE 1 TABLET BY MOUTH NIGHTLY AS NEEDED FOR ANXIETY  -     Vraylar 3 MG capsule; Take 3 mg by mouth daily           Plan:   Patient was seen and examined with all their questions and concerns addressed   Ketoconazole cream prescribed to be used on dry, itchy, flaking regions of skin on feet   Patient was re-appointed for 1 month  - Dr Pati Neves was available/present for entirety of patient encounter and present for all procedures  History of Present Illness     HPI: Leda Rizo is a 47 y o  female who presents complaining of dry, itching, flaky skin predominantly to the right heel  She reports the itching feeling is constant  She reports she sometimes gets dry skin on her hands, but otherwise denies any other skin rashes  The patient has no further podiatric complaints at this time  Review of Systems   Constitutional: Negative  HENT: Negative  Eyes: Negative  Respiratory: Negative  Cardiovascular: Negative  Gastrointestinal: Negative  Musculoskeletal: Negative  Skin: dry, itchy, flaking skin right heel  Neurological: Negative          Historical Information   Past Medical History:   Diagnosis Date    Anxiety     Bipolar disorder ([facility] [address] )     Depression     Disease of thyroid gland     Gallbladder calculus     Infectious viral hepatitis 2009    Hep C, treated    Wears glasses      Past Surgical History:   Procedure Laterality Date    LIVER BIOPSY      Last assessed: 7/1/15    NH LAP,CHOLECYSTECTOMY N/A 7/17/2019    Procedure: LAPAROSCOPIC CHOLECYSTECTOMY;  Surgeon: Lafe Gaucher, MD;  Location: [facility];  Service: General    TUBAL LIGATION       Social History Social History     Substance and Sexual Activity   Alcohol Use Yes    Comment: 2-3 times per week has 6+ vodka shots     Social History     Substance and Sexual Activity   Drug Use Not Currently    Comment: 1 year of cocaine use 25 years ago, none now     Social History     Tobacco Use   Smoking Status Never Smoker   Smokeless Tobacco Never Used     Family History:   Family History   Problem Relation Age of Onset   Brandyn Lee Breast cancer Mother 48    Uterine cancer Mother 62    Alcohol abuse Father         in recovery    Bipolar disorder Father         current episode depressed, current episode severity unspecified    COPD Father         w/acute bronchitis    Depression Father     Depression Sister     No Known Problems Daughter     Breast cancer Maternal Grandmother         unknown age   Brandyn Lee No Known Problems Sister     No Known Problems Maternal Aunt     No Known Problems Paternal Aunt     No Known Problems Cousin     No Known Problems Daughter        Meds/Allergies   (Not in a hospital admission)    Allergies   Allergen Reactions    Citalopram Hives     Reaction Date: 27Feb2012;     Lamotrigine Hives       Objective     Current Vitals:   Blood Pressure: 166/100 (05/02/22 0855)  Pulse: 68 (05/02/22 0855)  Height: 5' 2" (157 5 cm) (05/02/22 0855)  Weight - Scale: 84 6 kg (186 lb 9 6 oz) (05/02/22 0855)        /100   Pulse 68   Ht 5' 2" (1 575 m)   Wt 84 6 kg (186 lb 9 6 oz)   BMI 34 13 kg/m²       Lower Extremity Exam:    Foot Exam    Musculoskeletal:  MMT is 5/5 to all compartments of the LE  Ankle equinus is not present B/L      Biomechanical Exam of LE:  Ankle dorsiflexion with knee extended is able to get pass vertical on right and able to get pass vertical on left  Ankle dorsiflexion with knee flexed is able to get pass vertical on right and able to get pass vertical on left  Malleolar positioning is WNL b/l  STJ ROM WNL b/l, heel inversion is approximately 20° on right and 20° on left; heel eversion is approximately 10° on right and 10° on left; neutral calcaneal stance position is 0°   1st ray ROM WNL b/l, able to dorsiflex/plantarflex b/l  Tibial varum is 0° b/l, Resting calcaneal stance position is valgus and approximately 3° on right and valgus and approximately 3° on left  Gait analysis: pronated  Gross deformity noted: pes planus     Vascular:   DP & PT pulses palpable B/L  Capillary refill time <3 seconds B/L  Skin temperature WNL B/L  Dermatological:  No open Lesions  Interdigital maceration is not present  Dry, flaking skin with mild fissuring to right heel  Neurologic:  Gross sensation is intact  Protective sensation is Intact  Proprioception is intact  Patient denies numbness and/or paresthesias

## 2022-05-24 DIAGNOSIS — L30.9 ECZEMA, UNSPECIFIED TYPE: ICD-10-CM

## 2022-05-24 RX ORDER — CLOBETASOL PROPIONATE 0.5 MG/G
OINTMENT TOPICAL
Qty: 30 G | Refills: 0 | Status: SHIPPED | OUTPATIENT
Start: 2022-05-24

## 2022-08-04 ENCOUNTER — TELEPHONE (OUTPATIENT)
Dept: INTERNAL MEDICINE CLINIC | Facility: CLINIC | Age: 52
End: 2022-08-04

## 2022-08-04 NOTE — TELEPHONE ENCOUNTER
Patient is asking if you can enter orders for blood work to do prior to her physical   She is also asking for a recommendation to a dentist         Please advise

## 2022-08-05 NOTE — TELEPHONE ENCOUNTER
Patient called looking for her Lab scripts & a referral to a dentist     Please advise  CB: [phone]

## 2022-08-08 DIAGNOSIS — Z13.6 SCREENING FOR CARDIOVASCULAR CONDITION: Primary | ICD-10-CM

## 2022-08-08 DIAGNOSIS — Z01.20 ENCOUNTER FOR DENTAL EXAMINATION: ICD-10-CM

## 2022-08-09 ENCOUNTER — APPOINTMENT (OUTPATIENT)
Dept: LAB | Facility: HOSPITAL | Age: 52
End: 2022-08-09
Payer: MEDICARE

## 2022-08-09 DIAGNOSIS — Z13.6 SCREENING FOR CARDIOVASCULAR CONDITION: ICD-10-CM

## 2022-08-09 LAB
ALBUMIN SERPL BCP-MCNC: 3.9 G/DL (ref 3.5–5)
ALP SERPL-CCNC: 100 U/L (ref 46–116)
ALT SERPL W P-5'-P-CCNC: 38 U/L (ref 12–78)
ANION GAP SERPL CALCULATED.3IONS-SCNC: 2 MMOL/L (ref 4–13)
AST SERPL W P-5'-P-CCNC: 30 U/L (ref 5–45)
BILIRUB SERPL-MCNC: 1.08 MG/DL (ref 0.2–1)
BUN SERPL-MCNC: 14 MG/DL (ref 5–25)
CALCIUM SERPL-MCNC: 8.8 MG/DL (ref 8.3–10.1)
CHLORIDE SERPL-SCNC: 109 MMOL/L (ref 96–108)
CHOLEST SERPL-MCNC: 187 MG/DL
CO2 SERPL-SCNC: 27 MMOL/L (ref 21–32)
CREAT SERPL-MCNC: 0.82 MG/DL (ref 0.6–1.3)
GFR SERPL CREATININE-BSD FRML MDRD: 83 ML/MIN/1.73SQ M
GLUCOSE P FAST SERPL-MCNC: 92 MG/DL (ref 65–99)
HDLC SERPL-MCNC: 70 MG/DL
LDLC SERPL CALC-MCNC: 108 MG/DL (ref 0–100)
POTASSIUM SERPL-SCNC: 4.2 MMOL/L (ref 3.5–5.3)
PROT SERPL-MCNC: 7.5 G/DL (ref 6.4–8.4)
SODIUM SERPL-SCNC: 138 MMOL/L (ref 135–147)
TRIGL SERPL-MCNC: 47 MG/DL

## 2022-08-09 PROCEDURE — 80053 COMPREHEN METABOLIC PANEL: CPT

## 2022-08-09 PROCEDURE — 80061 LIPID PANEL: CPT

## 2022-08-09 PROCEDURE — 36415 COLL VENOUS BLD VENIPUNCTURE: CPT

## 2022-08-12 ENCOUNTER — RA CDI HCC (OUTPATIENT)
Dept: OTHER | Facility: HOSPITAL | Age: 52
End: 2022-08-12

## 2022-08-12 NOTE — PROGRESS NOTES
[facility] [address]  coding opportunities       Chart reviewed, no opportunity found: CHART REVIEWED, NO OPPORTUNITY FOUND        Patients Insurance     Medicare Insurance: Medicare

## 2022-08-17 ENCOUNTER — TELEPHONE (OUTPATIENT)
Dept: INTERNAL MEDICINE CLINIC | Facility: CLINIC | Age: 52
End: 2022-08-17

## 2022-08-18 NOTE — TELEPHONE ENCOUNTER
Patient called in again asking for her results, she had them done almost 2 weeks ago and is wondering why it is taking so long?  Please advise

## 2022-08-23 ENCOUNTER — OFFICE VISIT (OUTPATIENT)
Dept: INTERNAL MEDICINE CLINIC | Facility: CLINIC | Age: 52
End: 2022-08-23
Payer: MEDICARE

## 2022-08-23 VITALS
BODY MASS INDEX: 33.2 KG/M2 | TEMPERATURE: 97.2 F | HEIGHT: 62 IN | OXYGEN SATURATION: 99 % | SYSTOLIC BLOOD PRESSURE: 142 MMHG | RESPIRATION RATE: 16 BRPM | HEART RATE: 84 BPM | WEIGHT: 180.4 LBS | DIASTOLIC BLOOD PRESSURE: 84 MMHG

## 2022-08-23 DIAGNOSIS — Z13.220 SCREENING CHOLESTEROL LEVEL: ICD-10-CM

## 2022-08-23 DIAGNOSIS — Z59.9 FINANCIAL DIFFICULTIES: ICD-10-CM

## 2022-08-23 DIAGNOSIS — E80.6 HYPERBILIRUBINEMIA: ICD-10-CM

## 2022-08-23 DIAGNOSIS — J30.1 NON-SEASONAL ALLERGIC RHINITIS DUE TO POLLEN: ICD-10-CM

## 2022-08-23 DIAGNOSIS — Z00.00 MEDICARE ANNUAL WELLNESS VISIT, SUBSEQUENT: Primary | ICD-10-CM

## 2022-08-23 DIAGNOSIS — H65.491 CHRONIC MEE (MIDDLE EAR EFFUSION), RIGHT: ICD-10-CM

## 2022-08-23 DIAGNOSIS — Z12.11 COLON CANCER SCREENING: ICD-10-CM

## 2022-08-23 DIAGNOSIS — E03.9 HYPOTHYROIDISM, UNSPECIFIED TYPE: ICD-10-CM

## 2022-08-23 PROCEDURE — G0439 PPPS, SUBSEQ VISIT: HCPCS | Performed by: PHYSICIAN ASSISTANT

## 2022-08-23 RX ORDER — LEVOTHYROXINE SODIUM 0.07 MG/1
75 TABLET ORAL DAILY
Qty: 90 TABLET | Refills: 1 | Status: SHIPPED | OUTPATIENT
Start: 2022-08-23

## 2022-08-23 RX ORDER — MONTELUKAST SODIUM 10 MG/1
10 TABLET ORAL
Qty: 90 TABLET | Refills: 1 | Status: SHIPPED | OUTPATIENT
Start: 2022-08-23

## 2022-08-23 SDOH — ECONOMIC STABILITY - INCOME SECURITY: PROBLEM RELATED TO HOUSING AND ECONOMIC CIRCUMSTANCES, UNSPECIFIED: Z59.9

## 2022-08-23 NOTE — PROGRESS NOTES
Assessment and Plan:     Problem List Items Addressed This Visit        Endocrine    Hypothyroidism    Relevant Medications    levothyroxine 75 mcg tablet    Other Relevant Orders    TSH + Free T4       Respiratory    Non-seasonal allergic rhinitis due to pollen    Relevant Medications    montelukast (SINGULAIR) 10 mg tablet       Nervous and Auditory    Chronic JEREMY (middle ear effusion), right    Relevant Orders    Ambulatory Referral to Otolaryngology       Other    Screening cholesterol level - Primary    Relevant Orders    Lipid Panel with Direct LDL reflex    Financial difficulties    Relevant Orders    Ambulatory referral to social work care management program    Hyperbilirubinemia    Relevant Orders    Comprehensive metabolic panel    Bilirubin, Total and Direct        BMI Counseling: Body mass index is 33 kg/m²  The BMI is above normal  Nutrition recommendations include decreasing portion sizes  Exercise recommendations include exercising 3-5 times per week  Rationale for BMI follow-up plan is due to patient being overweight or obese  Preventive health issues were discussed with patient, and age appropriate screening tests were ordered as noted in patient's After Visit Summary  Personalized health advice and appropriate referrals for health education or preventive services given if needed, as noted in patient's After Visit Summary  History of Present Illness:     Patient presents for a Medicare Wellness Visit  Patient has persistent right mere crusting and flaking in the morning associated with intense itching that has gone on for 3-4 years  She has used otic gtts without improvement  She also struggles with her weight despite walking and eating well  Hx of Hypothyroidism compliant with medication      HPI   Patient Care Team:  Latoya Serrato DO as PCP - General Eva Howell DO     Review of Systems:     Review of Systems   Constitutional: Negative for activity change, appetite change, chills, fatigue, fever and unexpected weight change  HENT: Negative for congestion, hearing loss, nosebleeds and tinnitus  Bilateral itching ears with wax  Eyes: Negative for visual disturbance  Respiratory: Negative for shortness of breath  Cardiovascular: Negative for chest pain and palpitations  Gastrointestinal: Negative for abdominal pain, constipation, diarrhea, nausea and vomiting  Genitourinary: Negative for difficulty urinating  Musculoskeletal: Negative for arthralgias, joint swelling, myalgias and neck pain  Skin: Negative for rash  Neurological: Negative for dizziness, weakness, numbness and headaches  Hematological: Does not bruise/bleed easily  Psychiatric/Behavioral: Negative for agitation, behavioral problems and confusion          Problem List:     Patient Active Problem List   Diagnosis    Anxiety    [facility]    Acute hepatitis C virus infection    Hypothyroidism    Fatigue    Wellness examination    Actinic otitis externa    Encounter for screening mammogram for breast cancer    Encounter for gynecological examination without abnormal finding    Screening for cardiovascular condition    Class 1 obesity due to excess calories without serious comorbidity in adult    Medicare annual wellness visit, subsequent    Postoperative visit    Class 2 drug-induced obesity without serious comorbidity with body mass index (BMI) of 36 0 to 36 9 in adult    Depression    Eczema    Family history of breast cancer    Acute folliculitis    Rash    Iron deficiency    Toe injury    Primary insomnia    Non-seasonal allergic rhinitis due to pollen    Upper respiratory tract infection    Yeast infection    Acute eczematoid otitis externa of left ear      Past Medical and Surgical History:     Past Medical History:   Diagnosis Date    Anxiety     [facility] disorder [facility]     Depression     Disease of thyroid gland     Gallbladder calculus  Infectious viral hepatitis 2009    Hep C, treated    Wears glasses      Past Surgical History:   Procedure Laterality Date    LIVER BIOPSY      Last assessed: 7/1/15    SD LAP,CHOLECYSTECTOMY N/A 7/17/2019    Procedure: Patrice Manzanares;  Surgeon: Pallavi Davis MD;  Location: [facility];  Service: General    TUBAL LIGATION        Family History:     Family History   Problem Relation Age of Onset    Breast cancer Mother 48    Uterine cancer Mother 62    Alcohol abuse Father         in recovery    Bipolar disorder Father         current episode depressed, current episode severity unspecified    COPD Father         w/acute bronchitis    Depression Father     Depression Sister     No Known Problems Daughter     Breast cancer Maternal Grandmother         unknown age   Cassandra [place] No Known Problems Sister     No Known Problems Maternal Aunt     No Known Problems Paternal Aunt     No Known Problems Cousin     No Known Problems Daughter       Social History:     Social History     Socioeconomic History    Marital status: Single     Spouse name: None    Number of children: None    Years of education: None    Highest education level: None   Occupational History    None   Tobacco Use    Smoking status: Never Smoker    Smokeless tobacco: Never Used   Vaping Use    Vaping Use: Never used   Substance and Sexual Activity    Alcohol use: Yes     Comment: 2-3 times per week has 6+ vodka shots    Drug use: Not Currently     Comment: 1 year of cocaine use 25 years ago, none now    Sexual activity: Yes   Other Topics Concern    None   Social History Narrative    Bereavement     Social Determinants of Health     Financial Resource Strain: Not on file   Food Insecurity: Not on file   Transportation Needs: Not on file   Physical Activity: Not on file   Stress: Not on file   Social Connections: Not on file   Intimate Partner Violence: Not on file   Housing Stability: Not on file      Medications and Allergies:     Current Outpatient Medications   Medication Sig Dispense Refill    Cholecalciferol (VITAMIN D3) 1000 units CAPS Take by mouth      clobetasol (TEMOVATE) 0 05 % ointment APPLY TO AFFECTED AREA TWICE A DAY 30 g 0    clonazePAM (KlonoPIN) 1 mg tablet Take 1 mg by mouth      clonazePAM (KlonoPIN) 2 mg tablet TAKE 1 TABLET BY MOUTH NIGHTLY AS NEEDED FOR ANXIETY   desvenlafaxine succinate (PRISTIQ) 50 mg 24 hr tablet Take 50 mg by mouth daily      ketoconazole (NIZORAL) 2 % cream Apply topically daily 15 g 0    levothyroxine 75 mcg tablet Take 1 tablet (75 mcg total) by mouth daily 90 tablet 1    montelukast (SINGULAIR) 10 mg tablet Take 1 tablet (10 mg total) by mouth daily at bedtime 90 tablet 1    risperiDONE (RisperDAL) 4 mg tablet Take 1 5 tablets by mouth daily Take 1 5 tablets each evening      triamcinolone (KENALOG) 0 1 % cream Apply 1 application topically 2 (two) times a day To affected area       Vraylar 3 MG capsule Take 3 mg by mouth daily      hydrocortisone-acetic acid (VOSOL-HC) otic solution Administer 3 drops into both ears 4 (four) times a day (Patient not taking: Reported on 8/23/2022) 10 mL 3    neomycin-polymyxin-hydrocortisone (CORTISPORIN) otic solution Administer 4 drops into both ears every 6 (six) hours (Patient not taking: Reported on 8/23/2022) 10 mL 0    nystatin (MYCOSTATIN) cream Apply 1 application topically 2 (two) times a day To affected area (Patient not taking: Reported on 8/23/2022)      trihexyphenidyl (ARTANE) 5 mg tablet Take 10 mg by mouth       No current facility-administered medications for this visit       Allergies   Allergen Reactions    Citalopram Hives     Reaction Date: 27Feb2012;     Lamotrigine Hives      Immunizations:     Immunization History   Administered Date(s) Administered    COVID-19 MODERNA VACC 0 5 ML IM 05/07/2021, 06/04/2021, 11/26/2021    Hep A / Hep B 12/05/2008, 01/12/2009, 06/15/2009    Hep A, adult 12/05/2008, 01/12/2009, 06/15/2009    Hep B, adult 12/05/2008, 01/12/2009, 06/15/2009    INFLUENZA 11/11/2008, 10/15/2013, 09/17/2014, 10/01/2015, 08/27/2018, 08/26/2020, 09/22/2021    Influenza Quadrivalent Preservative Free 3 years and older IM 09/16/2016, 09/07/2017    Influenza, injectable, quadrivalent, preservative free 0 5 mL 08/17/2019, 08/26/2020    Tdap 05/29/2018      Health Maintenance:         Topic Date Due    Colorectal Cancer Screening  Never done    Breast Cancer Screening: Mammogram  03/25/2023    Cervical Cancer Screening  11/24/2025    HIV Screening  Completed    Hepatitis C Screening  Discontinued         Topic Date Due    COVID-19 Vaccine (4 - Booster for Jacobo Longest series) 03/26/2022    Influenza Vaccine (1) 09/01/2022      Medicare Screening Tests and Risk Assessments:     Filomena Hutchins is here for her Subsequent Wellness visit  Health Risk Assessment:   Patient rates overall health as very good  Patient feels that their physical health rating is slightly better  Patient is satisfied with their life  Eyesight was rated as same  Hearing was rated as same  Patient feels that their emotional and mental health rating is slightly better  Patients states they are never, rarely angry  Patient states they are sometimes unusually tired/fatigued  Pain experienced in the last 7 days has been none  Patient states that she has experienced no weight loss or gain in last 6 months  Depression Screening:   PHQ-9 Score: 10      Fall Risk Screening: In the past year, patient has experienced: no history of falling in past year      Urinary Incontinence Screening:   Patient has not leaked urine accidently in the last six months  Home Safety:  Patient does not have trouble with stairs inside or outside of their home  Patient has working smoke alarms and has working carbon monoxide detector  Home safety hazards include: none  Nutrition:   Current diet is Regular       Medications:   Patient is currently taking over-the-counter supplements  OTC medications include: see medication list  Patient is able to manage medications  Activities of Daily Living (ADLs)/Instrumental Activities of Daily Living (IADLs):   Walk and transfer into and out of bed and chair?: Yes  Dress and groom yourself?: Yes    Bathe or shower yourself?: Yes    Feed yourself? Yes  Do your laundry/housekeeping?: Yes  Manage your money, pay your bills and track your expenses?: Yes  Make your own meals?: Yes    Do your own shopping?: Yes    Previous Hospitalizations:   Any hospitalizations or ED visits within the last 12 months?: No      Advance Care Planning:   Living will: No    Durable POA for healthcare: No    Advanced directive: No      PREVENTIVE SCREENINGS      Cardiovascular Screening:    General: Screening Current      Diabetes Screening:     General: Screening Current      Breast Cancer Screening:     General: Screening Current      Cervical Cancer Screening:    General: Screening Current      Lung Cancer Screening:     General: Screening Not Indicated      Hepatitis C Screening:    General: Screening Not Indicated, History Hepatitis C and Screening Current    Screening, Brief Intervention, and Referral to Treatment (SBIRT)    Screening  Typical number of drinks in a day: 0  Typical number of drinks in a week: 10  Interpretation: Low risk drinking behavior  Single Item Drug Screening:  How often have you used an illegal drug (including marijuana) or a prescription medication for non-medical reasons in the past year? never    Single Item Drug Screen Score: 0  Interpretation: Negative screen for possible drug use disorder    Other Counseling Topics:   Car/seat belt/driving safety, skin self-exam, sunscreen and calcium and vitamin D intake and regular weightbearing exercise       No exam data present     Physical Exam:     Pulse 84   Temp (!) 97 2 °F (36 2 °C)   Resp 16   Ht 5' 2" (1 575 m)   Wt 81 8 kg (180 lb 6 4 oz)   SpO2 99% BMI 33 00 kg/m²     Physical Exam  Vitals and nursing note reviewed  Constitutional:       General: She is not in acute distress  Appearance: Normal appearance  She is obese  She is not ill-appearing, toxic-appearing or diaphoretic  HENT:      Head: Normocephalic and atraumatic  Nose: Nose normal    Eyes:      General: No scleral icterus  Right eye: No discharge  Left eye: No discharge  Extraocular Movements: Extraocular movements intact  Pupils: Pupils are equal, round, and reactive to light  Neck:      Vascular: No carotid bruit  Cardiovascular:      Rate and Rhythm: Normal rate and regular rhythm  Pulses: Normal pulses  Heart sounds: Normal heart sounds  No murmur heard  No friction rub  No gallop  Pulmonary:      Effort: Pulmonary effort is normal       Breath sounds: Normal breath sounds  No wheezing, rhonchi or rales  Abdominal:      General: Bowel sounds are normal       Palpations: Abdomen is soft  Tenderness: There is no abdominal tenderness  There is no guarding or rebound  Musculoskeletal:         General: No swelling or tenderness  Cervical back: Normal range of motion and neck supple  No rigidity  No muscular tenderness  Right lower leg: No edema  Left lower leg: No edema  Skin:     Capillary Refill: Capillary refill takes less than 2 seconds  Coloration: Skin is not jaundiced  Findings: No bruising, erythema, lesion or rash  Neurological:      General: No focal deficit present  Mental Status: She is alert and oriented to person, place, and time  Motor: No weakness  Gait: Gait normal    Psychiatric:         Mood and Affect: Mood normal          Behavior: Behavior normal          Thought Content:  Thought content normal          Judgment: Judgment normal         Nutrition Assessment and Intervention:     Reviewed food recall journal      Physical Activity Assessment and Intervention:    Activity journal reviewed      Emotional and Mental Well-being, Sleep, Connectedness Assessment and Intervention:    Sleep/stress assessment performed    Depression and anxiety screening performed and reviewed      Tobacco and Toxic Substance Assessment and Intervention:     Tobacco use screening performed    Alcohol and drug use screening performed      Kristina Powers MD

## 2022-08-23 NOTE — PATIENT INSTRUCTIONS
Medicare Preventive Visit Patient Instructions  Thank you for completing your Welcome to Medicare Visit or Medicare Annual Wellness Visit today  Your next wellness visit will be due in one year (8/24/2023)  The screening/preventive services that you may require over the next 5-10 years are detailed below  Some tests may not apply to you based off risk factors and/or age  Screening tests ordered at today's visit but not completed yet may show as past due  Also, please note that scanned in results may not display below  Preventive Screenings:  Service Recommendations Previous Testing/Comments   Colorectal Cancer Screening  * Colonoscopy    * Fecal Occult Blood Test (FOBT)/Fecal Immunochemical Test (FIT)  * Fecal DNA/Cologuard Test  * Flexible Sigmoidoscopy Age: 39-70 years old   Colonoscopy: every 10 years (may be performed more frequently if at higher risk)  OR  FOBT/FIT: every 1 year  OR  Cologuard: every 3 years  OR  Sigmoidoscopy: every 5 years  Screening may be recommended earlier than age 39 if at higher risk for colorectal cancer  Also, an individualized decision between you and your healthcare provider will decide whether screening between the ages of 74-80 would be appropriate  Colonoscopy: Not on file  FOBT/FIT: Not on file  Cologuard: Not on file  Sigmoidoscopy: Not on file          Breast Cancer Screening Age: 36 years old  Frequency: every 1-2 years  Not required if history of left and right mastectomy Mammogram: 04/26/2022        Cervical Cancer Screening Between the ages of 21-29, pap smear recommended once every 3 years  Between the ages of 33-67, can perform pap smear with HPV co-testing every 5 years     Recommendations may differ for women with a history of total hysterectomy, cervical cancer, or abnormal pap smears in past  Pap Smear: 11/24/2020        Hepatitis C Screening Once for adults born between 1945 and 1965  More frequently in patients at high risk for Hepatitis C Hep C Antibody: 06/25/2021        Diabetes Screening 1-2 times per year if you're at risk for diabetes or have pre-diabetes Fasting glucose: 92 mg/dL (8/9/2022)  A1C: 5 2 % (6/25/2021)      Cholesterol Screening Once every 5 years if you don't have a lipid disorder  May order more often based on risk factors  Lipid panel: 08/09/2022          Other Preventive Screenings Covered by Medicare:  1  Abdominal Aortic Aneurysm (AAA) Screening: covered once if your at risk  You're considered to be at risk if you have a family history of AAA  2  Lung Cancer Screening: covers low dose CT scan once per year if you meet all of the following conditions: (1) Age 50-69; (2) No signs or symptoms of lung cancer; (3) Current smoker or have quit smoking within the last 15 years; (4) You have a tobacco smoking history of at least 20 pack years (packs per day multiplied by number of years you smoked); (5) You get a written order from a healthcare provider  3  Glaucoma Screening: covered annually if you're considered high risk: (1) You have diabetes OR (2) Family history of glaucoma OR (3)  aged 48 and older OR (3)  American aged 72 and older  3  Osteoporosis Screening: covered every 2 years if you meet one of the following conditions: (1) You're estrogen deficient and at risk for osteoporosis based off medical history and other findings; (2) Have a vertebral abnormality; (3) On glucocorticoid therapy for more than 3 months; (4) Have primary hyperparathyroidism; (5) On osteoporosis medications and need to assess response to drug therapy  · Last bone density test (DXA Scan): Not on file  5  HIV Screening: covered annually if you're between the age of 12-76  Also covered annually if you are younger than 13 and older than 72 with risk factors for HIV infection  For pregnant patients, it is covered up to 3 times per pregnancy      Immunizations:  Immunization Recommendations   Influenza Vaccine Annual influenza vaccination during flu season is recommended for all persons aged >= 6 months who do not have contraindications   Pneumococcal Vaccine   * Pneumococcal conjugate vaccine = PCV13 (Prevnar 13), PCV15 (Vaxneuvance), PCV20 (Prevnar 20)  * Pneumococcal polysaccharide vaccine = PPSV23 (Pneumovax) Adults 25-60 years old: 1-3 doses may be recommended based on certain risk factors  Adults 72 years old: 1-2 doses may be recommended based off what pneumonia vaccine you previously received   Hepatitis B Vaccine 3 dose series if at intermediate or high risk (ex: diabetes, end stage renal disease, liver disease)   Tetanus (Td) Vaccine - COST NOT COVERED BY MEDICARE PART B Following completion of primary series, a booster dose should be given every 10 years to maintain immunity against tetanus  Td may also be given as tetanus wound prophylaxis  Tdap Vaccine - COST NOT COVERED BY MEDICARE PART B Recommended at least once for all adults  For pregnant patients, recommended with each pregnancy  Shingles Vaccine (Shingrix) - COST NOT COVERED BY MEDICARE PART B  2 shot series recommended in those aged 48 and above     Health Maintenance Due:      Topic Date Due    Colorectal Cancer Screening  Never done    Breast Cancer Screening: Mammogram  03/25/2023    Cervical Cancer Screening  11/24/2025    HIV Screening  Completed    Hepatitis C Screening  Discontinued     Immunizations Due:      Topic Date Due    COVID-19 Vaccine (4 - Booster for Moderna series) 03/26/2022    Influenza Vaccine (1) 09/01/2022     Advance Directives   What are advance directives? Advance directives are legal documents that state your wishes and plans for medical care  These plans are made ahead of time in case you lose your ability to make decisions for yourself  Advance directives can apply to any medical decision, such as the treatments you want, and if you want to donate organs  What are the types of advance directives?   There are many types of advance directives, and each state has rules about how to use them  You may choose a combination of any of the following:  · Living will: This is a written record of the treatment you want  You can also choose which treatments you do not want, which to limit, and which to stop at a certain time  This includes surgery, medicine, IV fluid, and tube feedings  · Durable power of  for healthcare [facility]): This is a written record that states who you want to make healthcare choices for you when you are unable to make them for yourself  This person, called a proxy, is usually a family member or a friend  You may choose more than 1 proxy  · Do not resuscitate (DNR) order:  A DNR order is used in case your heart stops beating or you stop breathing  It is a request not to have certain forms of treatment, such as CPR  A DNR order may be included in other types of advance directives  · Medical directive: This covers the care that you want if you are in a coma, near death, or unable to make decisions for yourself  You can list the treatments you want for each condition  Treatment may include pain medicine, surgery, blood transfusions, dialysis, IV or tube feedings, and a ventilator (breathing machine)  · Values history: This document has questions about your views, beliefs, and how you feel and think about life  This information can help others choose the care that you would choose  Why are advance directives important? An advance directive helps you control your care  Although spoken wishes may be used, it is better to have your wishes written down  Spoken wishes can be misunderstood, or not followed  Treatments may be given even if you do not want them  An advance directive may make it easier for your family to make difficult choices about your care     Weight Management   Why it is important to manage your weight:  Being overweight increases your risk of health conditions such as heart disease, high blood pressure, type 2 diabetes, and certain types of cancer  It can also increase your risk for osteoarthritis, sleep apnea, and other respiratory problems  Aim for a slow, steady weight loss  Even a small amount of weight loss can lower your risk of health problems  How to lose weight safely:  A safe and healthy way to lose weight is to eat fewer calories and get regular exercise  You can lose up about 1 pound a week by decreasing the number of calories you eat by 500 calories each day  Healthy meal plan for weight management:  A healthy meal plan includes a variety of foods, contains fewer calories, and helps you stay healthy  A healthy meal plan includes the following:  · Eat whole-grain foods more often  A healthy meal plan should contain fiber  Fiber is the part of grains, fruits, and vegetables that is not broken down by your body  Whole-grain foods are healthy and provide extra fiber in your diet  Some examples of whole-grain foods are whole-wheat breads and pastas, oatmeal, brown rice, and bulgur  · Eat a variety of vegetables every day  Include dark, leafy greens such as spinach, kale, cintia greens, and mustard greens  Eat yellow and orange vegetables such as carrots, sweet potatoes, and winter squash  · Eat a variety of fruits every day  Choose fresh or canned fruit (canned in its own juice or light syrup) instead of juice  Fruit juice has very little or no fiber  · Eat low-fat dairy foods  Drink fat-free (skim) milk or 1% milk  Eat fat-free yogurt and low-fat cottage cheese  Try low-fat cheeses such as mozzarella and other reduced-fat cheeses  · Choose meat and other protein foods that are low in fat  Choose beans or other legumes such as split peas or lentils  Choose fish, skinless poultry (chicken or turkey), or lean cuts of red meat (beef or pork)  Before you cook meat or poultry, cut off any visible fat  · Use less fat and oil  Try baking foods instead of frying them   Add less fat, such as margarine, sour cream, regular salad dressing and mayonnaise to foods  Eat fewer high-fat foods  Some examples of high-fat foods include french fries, doughnuts, ice cream, and cakes  · Eat fewer sweets  Limit foods and drinks that are high in sugar  This includes candy, cookies, regular soda, and sweetened drinks  Exercise:  Exercise at least 30 minutes per day on most days of the week  Some examples of exercise include walking, biking, dancing, and swimming  You can also fit in more physical activity by taking the stairs instead of the elevator or parking farther away from stores  Ask your healthcare provider about the best exercise plan for you  © Copyright [employer] 2018 Information is for End User's use only and may not be sold, redistributed or otherwise used for commercial purposes   All illustrations and images included in CareNotes® are the copyrighted property of A D A M , Inc  or [address] [place] [address]

## 2022-08-24 ENCOUNTER — PATIENT OUTREACH (OUTPATIENT)
Dept: INTERNAL MEDICINE CLINIC | Facility: CLINIC | Age: 52
End: 2022-08-24

## 2022-08-24 NOTE — PROGRESS NOTES
OPCM SW received [facility] referral for financial strain, transportation needs, and food insecurity  Chart reviewed and phone call placed to patient  Patient is not available, voicemail left, awaiting call back from patient

## 2022-08-24 NOTE — PROGRESS NOTES
OPCM SW received phone call from patient  SW explained reason for phone call  Patient states she does not have any financial difficulties  Patient reports all bills are paid on time  Patient uses [employer] transportation  Patient has adequate food in the home, no food insecurity  Patient is trying to reach her [facility], phone number provided for [address] services [phone] to attempt to reach [facility]  Patient does not need OPCM SW services and referral closed

## 2022-08-26 ENCOUNTER — APPOINTMENT (OUTPATIENT)
Dept: LAB | Facility: CLINIC | Age: 52
End: 2022-08-26
Payer: MEDICARE

## 2022-08-26 DIAGNOSIS — E03.9 HYPOTHYROIDISM, UNSPECIFIED TYPE: ICD-10-CM

## 2022-08-26 DIAGNOSIS — E80.6 HYPERBILIRUBINEMIA: ICD-10-CM

## 2022-08-26 DIAGNOSIS — Z13.220 SCREENING CHOLESTEROL LEVEL: ICD-10-CM

## 2022-08-26 LAB
ALBUMIN SERPL BCP-MCNC: 3.9 G/DL (ref 3.5–5)
ALP SERPL-CCNC: 107 U/L (ref 46–116)
ALT SERPL W P-5'-P-CCNC: 36 U/L (ref 12–78)
ANION GAP SERPL CALCULATED.3IONS-SCNC: 3 MMOL/L (ref 4–13)
AST SERPL W P-5'-P-CCNC: 32 U/L (ref 5–45)
BILIRUB DIRECT SERPL-MCNC: 0.22 MG/DL (ref 0–0.2)
BILIRUB SERPL-MCNC: 0.99 MG/DL (ref 0.2–1)
BUN SERPL-MCNC: 14 MG/DL (ref 5–25)
CALCIUM SERPL-MCNC: 9 MG/DL (ref 8.3–10.1)
CHLORIDE SERPL-SCNC: 105 MMOL/L (ref 96–108)
CHOLEST SERPL-MCNC: 192 MG/DL
CO2 SERPL-SCNC: 28 MMOL/L (ref 21–32)
CREAT SERPL-MCNC: 0.8 MG/DL (ref 0.6–1.3)
GFR SERPL CREATININE-BSD FRML MDRD: 85 ML/MIN/1.73SQ M
GLUCOSE P FAST SERPL-MCNC: 92 MG/DL (ref 65–99)
HDLC SERPL-MCNC: 78 MG/DL
LDLC SERPL CALC-MCNC: 105 MG/DL (ref 0–100)
POTASSIUM SERPL-SCNC: 4.5 MMOL/L (ref 3.5–5.3)
PROT SERPL-MCNC: 7.8 G/DL (ref 6.4–8.4)
SODIUM SERPL-SCNC: 136 MMOL/L (ref 135–147)
T4 FREE SERPL-MCNC: 1.11 NG/DL (ref 0.76–1.46)
TRIGL SERPL-MCNC: 43 MG/DL
TSH SERPL DL<=0.05 MIU/L-ACNC: 1.59 UIU/ML (ref 0.45–4.5)

## 2022-08-26 PROCEDURE — 82248 BILIRUBIN DIRECT: CPT

## 2022-08-26 PROCEDURE — 36415 COLL VENOUS BLD VENIPUNCTURE: CPT

## 2022-08-26 PROCEDURE — 80053 COMPREHEN METABOLIC PANEL: CPT

## 2022-08-26 PROCEDURE — 80061 LIPID PANEL: CPT

## 2022-08-26 PROCEDURE — 84439 ASSAY OF FREE THYROXINE: CPT

## 2022-08-26 PROCEDURE — 84443 ASSAY THYROID STIM HORMONE: CPT

## 2022-08-29 ENCOUNTER — TELEPHONE (OUTPATIENT)
Dept: INTERNAL MEDICINE CLINIC | Facility: CLINIC | Age: 52
End: 2022-08-29

## 2022-09-07 DIAGNOSIS — L30.9 ECZEMA, UNSPECIFIED TYPE: ICD-10-CM

## 2022-09-07 RX ORDER — CLOBETASOL PROPIONATE 0.5 MG/G
OINTMENT TOPICAL
Qty: 30 G | Refills: 0 | Status: SHIPPED | OUTPATIENT
Start: 2022-09-07 | End: 2022-09-29

## 2022-09-08 NOTE — TELEPHONE ENCOUNTER
Patient stopped into the office regarding this  She would like her results for her recent blood work  Patient states she has been waiting 3 weeks for this        Please advise

## 2022-09-14 ENCOUNTER — TELEPHONE (OUTPATIENT)
Dept: OTHER | Facility: HOSPITAL | Age: 52
End: 2022-09-14

## 2022-09-14 DIAGNOSIS — R17 TOTAL BILIRUBIN, ELEVATED: Primary | ICD-10-CM

## 2022-09-14 DIAGNOSIS — E87.8 NARROW ANION GAP: ICD-10-CM

## 2022-09-14 NOTE — TELEPHONE ENCOUNTER
Called patient and reviewed labs  Discussed mild ly elevated total bilirubin and will repeat CMP for low anion gap and T bili  Direct bilirubin also ordered  Will discuss results when completed  Patient was grateful for the call  Pt has f/u office visit on 12/27/22 with Dr Olga Castillo

## 2022-09-19 ENCOUNTER — APPOINTMENT (OUTPATIENT)
Dept: LAB | Facility: CLINIC | Age: 52
End: 2022-09-19
Payer: MEDICARE

## 2022-09-19 DIAGNOSIS — Z79.899 ENCOUNTER FOR LONG-TERM (CURRENT) USE OF OTHER MEDICATIONS: ICD-10-CM

## 2022-09-19 DIAGNOSIS — R17 TOTAL BILIRUBIN, ELEVATED: ICD-10-CM

## 2022-09-19 DIAGNOSIS — E87.8 NARROW ANION GAP: ICD-10-CM

## 2022-09-19 LAB
ALBUMIN SERPL BCP-MCNC: 3.7 G/DL (ref 3.5–5)
ALP SERPL-CCNC: 114 U/L (ref 46–116)
ALT SERPL W P-5'-P-CCNC: 36 U/L (ref 12–78)
ANION GAP SERPL CALCULATED.3IONS-SCNC: 6 MMOL/L (ref 4–13)
AST SERPL W P-5'-P-CCNC: 23 U/L (ref 5–45)
BASOPHILS # BLD AUTO: 0.03 THOUSANDS/ΜL (ref 0–0.1)
BASOPHILS NFR BLD AUTO: 1 % (ref 0–1)
BILIRUB DIRECT SERPL-MCNC: 0.2 MG/DL (ref 0–0.2)
BILIRUB SERPL-MCNC: 0.77 MG/DL (ref 0.2–1)
BUN SERPL-MCNC: 17 MG/DL (ref 5–25)
CALCIUM SERPL-MCNC: 9.3 MG/DL (ref 8.3–10.1)
CHLORIDE SERPL-SCNC: 104 MMOL/L (ref 96–108)
CHOLEST SERPL-MCNC: 194 MG/DL
CO2 SERPL-SCNC: 27 MMOL/L (ref 21–32)
CREAT SERPL-MCNC: 0.81 MG/DL (ref 0.6–1.3)
EOSINOPHIL # BLD AUTO: 0.25 THOUSAND/ΜL (ref 0–0.61)
EOSINOPHIL NFR BLD AUTO: 4 % (ref 0–6)
ERYTHROCYTE [DISTWIDTH] IN BLOOD BY AUTOMATED COUNT: 11.7 % (ref 11.6–15.1)
GFR SERPL CREATININE-BSD FRML MDRD: 84 ML/MIN/1.73SQ M
GLUCOSE P FAST SERPL-MCNC: 90 MG/DL (ref 65–99)
HCT VFR BLD AUTO: 44.5 % (ref 34.8–46.1)
HDLC SERPL-MCNC: 71 MG/DL
HGB BLD-MCNC: 14.5 G/DL (ref 11.5–15.4)
IMM GRANULOCYTES # BLD AUTO: 0.03 THOUSAND/UL (ref 0–0.2)
IMM GRANULOCYTES NFR BLD AUTO: 1 % (ref 0–2)
LDLC SERPL CALC-MCNC: 114 MG/DL (ref 0–100)
LYMPHOCYTES # BLD AUTO: 2.02 THOUSANDS/ΜL (ref 0.6–4.47)
LYMPHOCYTES NFR BLD AUTO: 31 % (ref 14–44)
MCH RBC QN AUTO: 30.3 PG (ref 26.8–34.3)
MCHC RBC AUTO-ENTMCNC: 32.6 G/DL (ref 31.4–37.4)
MCV RBC AUTO: 93 FL (ref 82–98)
MONOCYTES # BLD AUTO: 0.49 THOUSAND/ΜL (ref 0.17–1.22)
MONOCYTES NFR BLD AUTO: 7 % (ref 4–12)
NEUTROPHILS # BLD AUTO: 3.77 THOUSANDS/ΜL (ref 1.85–7.62)
NEUTS SEG NFR BLD AUTO: 56 % (ref 43–75)
NONHDLC SERPL-MCNC: 123 MG/DL
NRBC BLD AUTO-RTO: 0 /100 WBCS
PLATELET # BLD AUTO: 276 THOUSANDS/UL (ref 149–390)
PMV BLD AUTO: 9 FL (ref 8.9–12.7)
POTASSIUM SERPL-SCNC: 4.5 MMOL/L (ref 3.5–5.3)
PROT SERPL-MCNC: 7.7 G/DL (ref 6.4–8.4)
RBC # BLD AUTO: 4.79 MILLION/UL (ref 3.81–5.12)
SODIUM SERPL-SCNC: 137 MMOL/L (ref 135–147)
TRIGL SERPL-MCNC: 43 MG/DL
TSH SERPL DL<=0.05 MIU/L-ACNC: 1.81 UIU/ML (ref 0.45–4.5)
WBC # BLD AUTO: 6.59 THOUSAND/UL (ref 4.31–10.16)

## 2022-09-19 PROCEDURE — 82248 BILIRUBIN DIRECT: CPT

## 2022-09-19 PROCEDURE — 80061 LIPID PANEL: CPT

## 2022-09-19 PROCEDURE — 80053 COMPREHEN METABOLIC PANEL: CPT

## 2022-09-19 PROCEDURE — 84443 ASSAY THYROID STIM HORMONE: CPT

## 2022-09-19 PROCEDURE — 85025 COMPLETE CBC W/AUTO DIFF WBC: CPT

## 2022-09-19 PROCEDURE — 36415 COLL VENOUS BLD VENIPUNCTURE: CPT

## 2022-09-21 ENCOUNTER — TELEPHONE (OUTPATIENT)
Dept: OTHER | Facility: HOSPITAL | Age: 52
End: 2022-09-21

## 2022-09-23 ENCOUNTER — TELEPHONE (OUTPATIENT)
Dept: OTHER | Facility: HOSPITAL | Age: 52
End: 2022-09-23

## 2022-09-29 ENCOUNTER — TELEPHONE (OUTPATIENT)
Dept: INTERNAL MEDICINE CLINIC | Facility: CLINIC | Age: 52
End: 2022-09-29

## 2022-09-29 DIAGNOSIS — L30.9 ECZEMA, UNSPECIFIED TYPE: ICD-10-CM

## 2022-09-29 DIAGNOSIS — F41.9 ANXIETY: Primary | ICD-10-CM

## 2022-09-29 RX ORDER — CLOBETASOL PROPIONATE 0.5 MG/G
OINTMENT TOPICAL
Qty: 30 G | Refills: 0 | Status: SHIPPED | OUTPATIENT
Start: 2022-09-29

## 2022-09-29 NOTE — TELEPHONE ENCOUNTER
Patient need a referral to be seen by  [facility] to see Yo Momin   [facility]: [phone]    Patient was being seen at this location but there was a miscommunication and now she needs a new referral ?

## 2022-09-29 NOTE — TELEPHONE ENCOUNTER
Spoke to patient, she states that this is her [facility] appt  Please put in order with correlating diagnosis

## 2022-10-03 ENCOUNTER — TRANSFERRED RECORDS (OUTPATIENT)
Dept: HEALTH INFORMATION MANAGEMENT | Facility: CLINIC | Age: 52
End: 2022-10-03

## 2022-10-11 PROBLEM — H60.542 ACUTE ECZEMATOID OTITIS EXTERNA OF LEFT EAR: Status: RESOLVED | Noted: 2022-03-21 | Resolved: 2022-10-11

## 2022-10-15 ENCOUNTER — HEALTH MAINTENANCE LETTER (OUTPATIENT)
Age: 52
End: 2022-10-15

## 2022-11-26 DIAGNOSIS — L30.9 ECZEMA, UNSPECIFIED TYPE: ICD-10-CM

## 2022-11-26 RX ORDER — CLOBETASOL PROPIONATE 0.5 MG/G
OINTMENT TOPICAL
Qty: 30 G | Refills: 0 | Status: SHIPPED | OUTPATIENT
Start: 2022-11-26

## 2022-12-12 ENCOUNTER — ONCOLOGY VISIT (OUTPATIENT)
Dept: ONCOLOGY | Facility: HOSPITAL | Age: 52
End: 2022-12-12
Attending: INTERNAL MEDICINE
Payer: COMMERCIAL

## 2022-12-12 ENCOUNTER — LAB (OUTPATIENT)
Dept: INFUSION THERAPY | Facility: HOSPITAL | Age: 52
End: 2022-12-12
Attending: INTERNAL MEDICINE
Payer: COMMERCIAL

## 2022-12-12 ENCOUNTER — PATIENT OUTREACH (OUTPATIENT)
Dept: ONCOLOGY | Facility: HOSPITAL | Age: 52
End: 2022-12-12

## 2022-12-12 VITALS
SYSTOLIC BLOOD PRESSURE: 134 MMHG | HEIGHT: 66 IN | WEIGHT: 150.1 LBS | DIASTOLIC BLOOD PRESSURE: 80 MMHG | OXYGEN SATURATION: 100 % | HEART RATE: 63 BPM | TEMPERATURE: 97.7 F | RESPIRATION RATE: 18 BRPM | BODY MASS INDEX: 24.12 KG/M2

## 2022-12-12 DIAGNOSIS — Z85.820 HISTORY OF MELANOMA: ICD-10-CM

## 2022-12-12 DIAGNOSIS — Z17.1 MALIGNANT NEOPLASM OF UPPER-OUTER QUADRANT OF RIGHT BREAST IN FEMALE, ESTROGEN RECEPTOR NEGATIVE (H): ICD-10-CM

## 2022-12-12 DIAGNOSIS — Z17.1 MALIGNANT NEOPLASM OF UPPER-OUTER QUADRANT OF RIGHT BREAST IN FEMALE, ESTROGEN RECEPTOR NEGATIVE (H): Primary | ICD-10-CM

## 2022-12-12 DIAGNOSIS — C50.411 MALIGNANT NEOPLASM OF UPPER-OUTER QUADRANT OF RIGHT BREAST IN FEMALE, ESTROGEN RECEPTOR NEGATIVE (H): Primary | ICD-10-CM

## 2022-12-12 DIAGNOSIS — C50.411 MALIGNANT NEOPLASM OF UPPER-OUTER QUADRANT OF RIGHT BREAST IN FEMALE, ESTROGEN RECEPTOR NEGATIVE (H): ICD-10-CM

## 2022-12-12 LAB
ALBUMIN SERPL BCG-MCNC: 4.7 G/DL (ref 3.5–5.2)
ALP SERPL-CCNC: 57 U/L (ref 35–104)
ALT SERPL W P-5'-P-CCNC: 32 U/L (ref 10–35)
ANION GAP SERPL CALCULATED.3IONS-SCNC: 10 MMOL/L (ref 7–15)
AST SERPL W P-5'-P-CCNC: 22 U/L (ref 10–35)
BILIRUB SERPL-MCNC: 0.3 MG/DL
BUN SERPL-MCNC: 12 MG/DL (ref 6–20)
CALCIUM SERPL-MCNC: 9.2 MG/DL (ref 8.6–10)
CHLORIDE SERPL-SCNC: 103 MMOL/L (ref 98–107)
CREAT SERPL-MCNC: 0.71 MG/DL (ref 0.51–0.95)
DEPRECATED HCO3 PLAS-SCNC: 29 MMOL/L (ref 22–29)
GFR SERPL CREATININE-BSD FRML MDRD: >90 ML/MIN/1.73M2
GLUCOSE SERPL-MCNC: 89 MG/DL (ref 70–99)
POTASSIUM SERPL-SCNC: 4 MMOL/L (ref 3.4–5.3)
PROT SERPL-MCNC: 7.2 G/DL (ref 6.4–8.3)
SODIUM SERPL-SCNC: 142 MMOL/L (ref 136–145)

## 2022-12-12 PROCEDURE — G0463 HOSPITAL OUTPT CLINIC VISIT: HCPCS

## 2022-12-12 PROCEDURE — 99214 OFFICE O/P EST MOD 30 MIN: CPT | Performed by: INTERNAL MEDICINE

## 2022-12-12 PROCEDURE — 99212 OFFICE O/P EST SF 10 MIN: CPT | Performed by: INTERNAL MEDICINE

## 2022-12-12 PROCEDURE — 80053 COMPREHEN METABOLIC PANEL: CPT

## 2022-12-12 PROCEDURE — 36415 COLL VENOUS BLD VENIPUNCTURE: CPT

## 2022-12-12 PROCEDURE — 82040 ASSAY OF SERUM ALBUMIN: CPT

## 2022-12-12 ASSESSMENT — PAIN SCALES - GENERAL: PAINLEVEL: NO PAIN (0)

## 2022-12-12 NOTE — PROGRESS NOTES
[facility] cancer Care Progress Note  Patient: Corinna Kinney   MRN:  [id number]   Date of Service : Dec 12, 2022        Reason for visit      1. Malignant neoplasm of upper-outer quadrant of right breast in female, estrogen receptor negative (H)        Assessment     1. CA breast right-sided T2 N1 M0 ER/WI negative HER-2/tiffani 3+ status post neoadjuvant chemotherapy with carboplatin and Taxotere and Herceptin which led to complete pathological response. She had bilateral mastectomy with reconstruction and then also received radiation therapy to her right breast.  Normal clinical exam today.  2. Stage IA malignant melanoma from her right side of her back removed in 2012.  She follows up with [facility] dermatology.  No evidence of any recurrence.  3. Negative genetic testing.  4. Good general health otherwise.   5.  Fully COVID vaccinated.  6.  Perimenopausal status.    Plan     1.  At this time continue with annual surveillance.  She can also follow-up with her primary doctor if she chooses to.  2.  Continue annual dermatological follow-up.  3.  Continue good diet and exercise.  4.  Take calcium and vitamin D supplementation.  She does need a bone density once her menopause is confirmed.  She is at high risk of developing osteopenia/osteoporosis due to her prior exposure to chemotherapy.  5.  Continue observe pandemic precaution.    Clinical stage      Cancer Staging  Breast cancer of upper-outer quadrant of right female breast (H)  Staging form: Breast, AJCC 7th Edition  - Clinical stage from 6/22/2011: Stage IIB (T2, N1, cM0, Free text: ER-,WI-, [id number] 3+) - Signed by Nicolas Massey MD on 1/25/2016  - Pathologic: No stage assigned - Unsigned    Malignant Melanoma Of The Skin  Staging form: Melanoma of the Skin, AJCC 7th Edition  - Clinical: Stage IA (T1a, N0, M0) - Signed by Nicolas Massey MD on 1/25/2016  - Pathologic: No stage assigned - Unsigned      History     Corinna Kinney is  a very pleasant 52 year old  female with a diagnosis of stage II invasive ductal carcinoma diagnosed in October 2011 located on her right breast presenting with the yellowish nipple discharge and workup revealed that she had a mass measuring over 2 cm in size with right axillary lymphadenopathy. She underwent biopsy of axillary mass as well as a lymph node which showed high-grade invasive ductal carcinoma ER/VT negative HER-2/tiffani 3+. She was treated with neoadjuvant carboplatin and Taxotere and Herceptin which resulted in complete pathological response. She then underwent bilateral mastectomy with reconstruction. She was also treated with radiation therapy to the right breast. She went on to finish one year of Herceptin. In October 2012 she was diagnosed to have melanoma located on her back which was 0.75 mm deep Deyvi's level IV with negative lymph nodes on reexcision. She has been followed up with observation for that. She did have a PET scan after the melanoma was diagnosed and that was negative.    She continues to follow with the dermatologist.  Gets a full body checkup at least once a year.   She did undergo a little bit more of genetic testing at [facility] and that also came back negative.      She did have COVID-19 back in November 2020.  Was sick for about 10 days.  Quarantine for about 2 weeks. Was last seen at [facility] in June 2020.  She also underwent colonoscopy in November 2020.    She comes in today for scheduled follow-up.  Overall she seems to be doing okay.  She is having some menopausal symptoms although she does get a period every now and then.  Weight is stable.  Her main complaint is some conjunctival injections in both eyes.    Past Medical History     Past Medical History:   Diagnosis Date     Backache     Created by Conversion      Benign Pigmented Nevus     Created by Conversion      Breast cancer (H)      Breast Neoplasm     Created by [employer] [facility]  "Annotation: Oct 21 2011  2:28PM - Beata Callahan: intraductal,  +HER2 receptors, -ER/PRtreated with carboplatin, taxotere and herceptin      Lentigo     Created by Conversion      Lymphedema     Created by Conversion      Malignant Melanoma Of The Skin     Created by Conversion      Metrorrhagia     Created by Conversion            Review of Systems   A 14 point review of systems was obtained.  Positive findings noted in the history.  Rest of the review of system is otherwise negative.     ECOG performance status and Distress Assessment      ECOG Performance:    ECOG Performance Status: 0    Distress Assessment  Distress Assessment Score: No distress:     Pain Status  Currently in Pain: No/denies      Vital Signs     /80 (BP Location: Left arm, Patient Position: Sitting, Cuff Size: Adult Regular)   Pulse 63   Temp 97.7  F (36.5  C) (Tympanic)   Resp 18   Ht 1.664 m (5' 5.5\")   Wt 68.1 kg (150 lb 1.6 oz)   SpO2 100%   BMI 24.60 kg/m       Physical Exam     GENERAL: No acute distress. Cooperative in conversation.   HEENT:  Pupils are equal, round and reactive. Oral mucosa is clean and intact. No ulcerations or mucositis noted. No bleeding noted.  RESP:Chest symmetric lungs are clear bilaterally per auscultation. Regular respiratory rate. No wheezes or rhonchi.  CV: Normal S1 S2 Regular, rate and rhythm. No murmurs.    ABD: Nondistended, soft, nontender. Positive bowel sounds. No organomegaly.   EXTREMITIES: No lower extremity edema.   NEURO: Non- focal. Alert and oriented x3.  Cranial nerves appear intact.  PSYCH: Within normal limits. No depression or anxiety.  SKIN: Warm dry intact.   BREAST: Status post bilateral mastectomy.  Good reconstruction results.  No evidence of any recurrence.    Lab Results     Recent Results (from the past 240 hour(s))   Comprehensive metabolic panel    Collection Time: 12/12/22  1:03 PM   Result Value Ref Range    Sodium 142 136 - 145 mmol/L    Potassium 4.0 3.4 - 5.3 " mmol/L    Chloride 103 98 - 107 mmol/L    Carbon Dioxide (CO2) 29 22 - 29 mmol/L    Anion Gap 10 7 - 15 mmol/L    Urea Nitrogen 12.0 6.0 - 20.0 mg/dL    Creatinine 0.71 0.51 - 0.95 mg/dL    Calcium 9.2 8.6 - 10.0 mg/dL    Glucose 89 70 - 99 mg/dL    Alkaline Phosphatase 57 35 - 104 U/L    AST 22 10 - 35 U/L    ALT 32 10 - 35 U/L    Protein Total 7.2 6.4 - 8.3 g/dL    Albumin 4.7 3.5 - 5.2 g/dL    Bilirubin Total 0.3 <=1.2 mg/dL    GFR Estimate >90 >60 mL/min/1.73m2       Imaging Results     No results found.       Nicolas Massey MD

## 2022-12-12 NOTE — LETTER
12/12/2022         RE: Corinna Kinney  [address]  [facility] [address]        Dear Colleague,    Thank you for referring your patient, Corinna Kinney, to the [facility]. Please see a copy of my visit note below.    [facility] cancer Care Progress Note  Patient: Corinna Kinney   MRN:  [id number]   Date of Service : Dec 12, 2022        Reason for visit      1. Malignant neoplasm of upper-outer quadrant of right breast in female, estrogen receptor negative (H)        Assessment     1. CA breast right-sided T2 N1 M0 ER/NE negative HER-2/tiffani 3+ status post neoadjuvant chemotherapy with carboplatin and Taxotere and Herceptin which led to complete pathological response. She had bilateral mastectomy with reconstruction and then also received radiation therapy to her right breast.  Normal clinical exam today.  2. Stage IA malignant melanoma from her right side of her back removed in 2012.  She follows up with [facility] dermatology.  No evidence of any recurrence.  3. Negative genetic testing.  4. Good general health otherwise.   5.  Fully COVID vaccinated.  6.  Perimenopausal status.    Plan     1.  At this time continue with annual surveillance.  She can also follow-up with her primary doctor if she chooses to.  2.  Continue annual dermatological follow-up.  3.  Continue good diet and exercise.  4.  Take calcium and vitamin D supplementation.  She does need a bone density once her menopause is confirmed.  She is at high risk of developing osteopenia/osteoporosis due to her prior exposure to chemotherapy.  5.  Continue observe pandemic precaution.    Clinical stage      Cancer Staging  Breast cancer of upper-outer quadrant of right female breast (H)  Staging form: Breast, AJCC 7th Edition  - Clinical stage from 6/22/2011: Stage IIB (T2, N1, cM0, Free text: ER-,NE-, Iyp0smq 3+) - Signed by Nicolas Massey MD on 1/25/2016  - Pathologic: No stage  assigned - Unsigned    Malignant Melanoma Of The Skin  Staging form: Melanoma of the Skin, AJCC 7th Edition  - Clinical: Stage IA (T1a, N0, M0) - Signed by Nicolas Massey MD on 1/25/2016  - Pathologic: No stage assigned - Unsigned      History     Corinna Kinney is a very pleasant 52 year old  female with a diagnosis of stage II invasive ductal carcinoma diagnosed in October 2011 located on her right breast presenting with the yellowish nipple discharge and workup revealed that she had a mass measuring over 2 cm in size with right axillary lymphadenopathy. She underwent biopsy of axillary mass as well as a lymph node which showed high-grade invasive ductal carcinoma ER/WV negative HER-2/tiffani 3+. She was treated with neoadjuvant carboplatin and Taxotere and Herceptin which resulted in complete pathological response. She then underwent bilateral mastectomy with reconstruction. She was also treated with radiation therapy to the right breast. She went on to finish one year of Herceptin. In October 2012 she was diagnosed to have melanoma located on her back which was 0.75 mm deep Deyvi's level IV with negative lymph nodes on reexcision. She has been followed up with observation for that. She did have a PET scan after the melanoma was diagnosed and that was negative.    She continues to follow with the dermatologist.  Gets a full body checkup at least once a year.   She did undergo a little bit more of genetic testing at [facility] and that also came back negative.      She did have COVID-19 back in November 2020.  Was sick for about 10 days.  Quarantine for about 2 weeks. Was last seen at [facility] in June 2020.  She also underwent colonoscopy in November 2020.    She comes in today for scheduled follow-up.  Overall she seems to be doing okay.  She is having some menopausal symptoms although she does get a period every now and then.  Weight is stable.  Her main complaint is some conjunctival  "injections in both eyes.    Past Medical History     Past Medical History:   Diagnosis Date     Backache     Created by Conversion      Benign Pigmented Nevus     Created by Conversion      Breast cancer (H)      Breast Neoplasm     Created by Conversion [facility] Annotation: Oct 21 2011  2:28PM - Beata Callahan: intraductal,  +HER2 receptors, -ER/PRtreated with carboplatin, taxotere and herceptin      Lentigo     Created by Conversion      Lymphedema     Created by Conversion      Malignant Melanoma Of The Skin     Created by Conversion      Metrorrhagia     Created by Conversion            Review of Systems   A 14 point review of systems was obtained.  Positive findings noted in the history.  Rest of the review of system is otherwise negative.     ECOG performance status and Distress Assessment      ECOG Performance:    ECOG Performance Status: 0    Distress Assessment  Distress Assessment Score: No distress:     Pain Status  Currently in Pain: No/denies      Vital Signs     /80 (BP Location: Left arm, Patient Position: Sitting, Cuff Size: Adult Regular)   Pulse 63   Temp 97.7  F (36.5  C) (Tympanic)   Resp 18   Ht 1.664 m (5' 5.5\")   Wt 68.1 kg (150 lb 1.6 oz)   SpO2 100%   BMI 24.60 kg/m       Physical Exam     GENERAL: No acute distress. Cooperative in conversation.   HEENT:  Pupils are equal, round and reactive. Oral mucosa is clean and intact. No ulcerations or mucositis noted. No bleeding noted.  RESP:Chest symmetric lungs are clear bilaterally per auscultation. Regular respiratory rate. No wheezes or rhonchi.  CV: Normal S1 S2 Regular, rate and rhythm. No murmurs.    ABD: Nondistended, soft, nontender. Positive bowel sounds. No organomegaly.   EXTREMITIES: No lower extremity edema.   NEURO: Non- focal. Alert and oriented x3.  Cranial nerves appear intact.  PSYCH: Within normal limits. No depression or anxiety.  SKIN: Warm dry intact.   BREAST: Status post bilateral mastectomy.  Good " "reconstruction results.  No evidence of any recurrence.    Lab Results     Recent Results (from the past 240 hour(s))   Comprehensive metabolic panel    Collection Time: 12/12/22  1:03 PM   Result Value Ref Range    Sodium 142 136 - 145 mmol/L    Potassium 4.0 3.4 - 5.3 mmol/L    Chloride 103 98 - 107 mmol/L    Carbon Dioxide (CO2) 29 22 - 29 mmol/L    Anion Gap 10 7 - 15 mmol/L    Urea Nitrogen 12.0 6.0 - 20.0 mg/dL    Creatinine 0.71 0.51 - 0.95 mg/dL    Calcium 9.2 8.6 - 10.0 mg/dL    Glucose 89 70 - 99 mg/dL    Alkaline Phosphatase 57 35 - 104 U/L    AST 22 10 - 35 U/L    ALT 32 10 - 35 U/L    Protein Total 7.2 6.4 - 8.3 g/dL    Albumin 4.7 3.5 - 5.2 g/dL    Bilirubin Total 0.3 <=1.2 mg/dL    GFR Estimate >90 >60 mL/min/1.73m2       Imaging Results     No results found.       Nicolas Massey MD         Oncology Rooming Note    December 12, 2022 1:53 PM   Corinna Kinney is a 52 year old female who presents for:    Chief Complaint   Patient presents with     Oncology Clinic Visit     1 year follow up related to Malignant neoplasm of upper-outer quadrant of right breast in female, estrogen receptor negative      Initial Vitals: /80 (BP Location: Left arm, Patient Position: Sitting, Cuff Size: Adult Regular)   Pulse 63   Temp 97.7  F (36.5  C) (Tympanic)   Resp 18   Ht 1.664 m (5' 5.5\")   Wt 68.1 kg (150 lb 1.6 oz)   SpO2 100%   BMI 24.60 kg/m   Estimated body mass index is 24.6 kg/m  as calculated from the following:    Height as of this encounter: 1.664 m (5' 5.5\").    Weight as of this encounter: 68.1 kg (150 lb 1.6 oz). Body surface area is 1.77 meters squared.  No Pain (0) Comment: Data Unavailable   No LMP recorded.  Allergies reviewed: Yes  Medications reviewed: Yes    Medications: Medication refills not needed today.  Pharmacy name entered into [facility]:    [facility] #[id number] - [address] AT [facility] [address]  [id number] [place], [place] - " [address] [facility] [address]    Clinical concerns: 1 year follow up related to Malignant neoplasm of upper-outer quadrant of right breast in female, estrogen receptor negative     BIPIN BENNETT CMA                Again, thank you for allowing me to participate in the care of your patient.        Sincerely,        Nicolas Massey MD, MD

## 2022-12-12 NOTE — PROGRESS NOTES
"Oncology Rooming Note    December 12, 2022 1:53 PM   Corinna Kinney is a 52 year old female who presents for:    Chief Complaint   Patient presents with     Oncology Clinic Visit     1 year follow up related to Malignant neoplasm of upper-outer quadrant of right breast in female, estrogen receptor negative      Initial Vitals: /80 (BP Location: Left arm, Patient Position: Sitting, Cuff Size: Adult Regular)   Pulse 63   Temp 97.7  F (36.5  C) (Tympanic)   Resp 18   Ht 1.664 m (5' 5.5\")   Wt 68.1 kg (150 lb 1.6 oz)   SpO2 100%   BMI 24.60 kg/m   Estimated body mass index is 24.6 kg/m  as calculated from the following:    Height as of this encounter: 1.664 m (5' 5.5\").    Weight as of this encounter: 68.1 kg (150 lb 1.6 oz). Body surface area is 1.77 meters squared.  No Pain (0) Comment: Data Unavailable   No LMP recorded.  Allergies reviewed: Yes  Medications reviewed: Yes    Medications: Medication refills not needed today.  Pharmacy name entered into [facility]:    [facility] #[id number] - [address] AT [facility] & [address]  [id number] - [address]    Clinical concerns: 1 year follow up related to Malignant neoplasm of upper-outer quadrant of right breast in female, estrogen receptor negative     BIPIN BENNETT CMA            "

## 2022-12-21 ENCOUNTER — RA CDI HCC (OUTPATIENT)
Dept: OTHER | Facility: HOSPITAL | Age: 52
End: 2022-12-21

## 2023-02-03 ENCOUNTER — TELEPHONE (OUTPATIENT)
Dept: INTERNAL MEDICINE CLINIC | Facility: CLINIC | Age: 53
End: 2023-02-03

## 2023-02-03 NOTE — TELEPHONE ENCOUNTER
Patient called stating she received a tetanus shot 2 weeks ago and her arm where the shot was given is still very sore  Pt described the pain as a "throbbing tooth ache" type feeling  Patient is asking what she should do for the pain     [phone]

## 2023-02-03 NOTE — TELEPHONE ENCOUNTER
Spoke to pt and advise to use a warm moist compress and tylenol, if that not improve make a visit or go to a urgent care  Tatiana Soto

## 2023-02-06 ENCOUNTER — TELEPHONE (OUTPATIENT)
Dept: ADMINISTRATIVE | Facility: OTHER | Age: 53
End: 2023-02-06

## 2023-02-06 ENCOUNTER — HOSPITAL ENCOUNTER (EMERGENCY)
Facility: HOSPITAL | Age: 53
Discharge: HOME | End: 2023-02-06
Attending: EMERGENCY MEDICINE

## 2023-02-06 VITALS
DIASTOLIC BLOOD PRESSURE: 89 MMHG | SYSTOLIC BLOOD PRESSURE: 159 MMHG | OXYGEN SATURATION: 93 % | HEART RATE: 76 BPM | TEMPERATURE: 98.3 F | RESPIRATION RATE: 16 BRPM

## 2023-02-06 DIAGNOSIS — M79.601 RIGHT ARM PAIN: Primary | ICD-10-CM

## 2023-02-06 DIAGNOSIS — L30.9 ECZEMA, UNSPECIFIED TYPE: ICD-10-CM

## 2023-02-06 RX ORDER — LIDOCAINE 50 MG/G
1 PATCH TOPICAL DAILY
Qty: 15 PATCH | Refills: 0 | Status: SHIPPED | OUTPATIENT
Start: 2023-02-06

## 2023-02-06 RX ORDER — IBUPROFEN 600 MG/1
600 TABLET ORAL ONCE
Status: COMPLETED | OUTPATIENT
Start: 2023-02-06 | End: 2023-02-06

## 2023-02-06 RX ORDER — LIDOCAINE 50 MG/G
1 PATCH TOPICAL ONCE
Status: DISCONTINUED | OUTPATIENT
Start: 2023-02-06 | End: 2023-02-06 | Stop reason: HOSPADM

## 2023-02-06 RX ORDER — CLOBETASOL PROPIONATE 0.5 MG/G
OINTMENT TOPICAL
Qty: 30 G | Refills: 0 | Status: SHIPPED | OUTPATIENT
Start: 2023-02-06

## 2023-02-06 RX ADMIN — LIDOCAINE 5% 1 PATCH: 700 PATCH TOPICAL at 08:26

## 2023-02-06 RX ADMIN — IBUPROFEN 600 MG: 600 TABLET ORAL at 08:26

## 2023-02-06 NOTE — TELEPHONE ENCOUNTER
----- Message from Magalis Rivas sent at 2/3/2023  3:00 PM EST -----  Regarding: tdap  02/03/23 3:05 PM    Hello, our patient Aneudy Scott has had Immunization(s) completed/performed   Please assist in updating the patient chart by making an External outreach to [address] [place] [address] located in [address] [place] [address] The date of service is 01/23    Thank you,  Magalis Rivas  [facility]

## 2023-02-06 NOTE — ED ATTENDING ATTESTATION
2/6/2023  ILukas MD, saw and evaluated the patient  I have discussed the patient with the resident/non-physician practitioner and agree with the resident's/non-physician practitioner's findings, Plan of Care, and MDM as documented in the resident's/non-physician practitioner's note, except where noted  All available labs and Radiology studies were reviewed  I was present for key portions of any procedure(s) performed by the resident/non-physician practitioner and I was immediately available to provide assistance  At this point I agree with the current assessment done in the Emergency Department  I have conducted an independent evaluation of this patient a history and physical is as follows:    ED Course     36-year-old female presenting to the emergency department for evaluation of lateral right shoulder pain  Patient states that 3 weeks ago, she received a tetanus shot in the right deltoid  She states that since that time she has a pain in the right lateral shoulder that she describes as "like a toothache"  Pain is made worse with movement of the right shoulder, in particular with abduction  Patient states that she has had 1 episode where the pain radiated down the right arm to the level of the right wrist   She states that this occurred over the course of about a week, however has subsequently resolved  Patient denies any weakness in the right upper extremity  Patient denies any associated neck pain  No direct trauma to the area  On examination head is normocephalic and atraumatic  Eyelids lashes normal   Neck is nontender to palpation  Axially loading the head does not exacerbate the shoulder pain  The right shoulder is unremarkable in external appearance  There is no warmth or swelling noted  There is no erythema  Nontender to palpation over the scapula, AC joint, clavicle, and lateral shoulder    The patient has mild pain with passive and active abduction at the right shoulder as well as flexion at the right shoulder  Patient has 5 out of 5 motor strength through biceps flexion, triceps extension, and ulnar, median, and radial nerve testing at the level of the hand and wrist   Sensation intact  Musculoskeletal pain of the right shoulder  This could represent muscular inflammation after tetanus, deltoid bursitis, on exam the patient has no findings to suggest septic arthritis of the shoulder  Patient will be treated symptomatically with anti-inflammatory medications, referred to physical therapy, with follow-up with sports medicine if she does not have improvement      Critical Care Time  Procedures

## 2023-02-06 NOTE — ED PROVIDER NOTES
History  Chief Complaint   Patient presents with   • Arm Pain     Pt reports R arm pain x3 weeks after tetanus shot; pt states she has been taking tylenol, motrin, and muscle relaxer with no relief; pt denies any other symptoms     HPI     Patient is a 46 y/o F with R arm pain  Tetanus shot 3 weeks ago and persistent pain since  Tried tylenol, motrin without relief  Cannot think of any other inciting event, no trauma or overuse  No other signs of reaction, no local redness, no systemic symptoms of difficulty breathing/throat swelling, rash, n/v/d  Prior to Admission Medications   Prescriptions Last Dose Informant Patient Reported? Taking? Cholecalciferol (VITAMIN D3) 1000 units CAPS   Yes No   Sig: Take by mouth   Vraylar 3 MG capsule   Yes No   Sig: Take 3 mg by mouth daily   clonazePAM (KlonoPIN) 1 mg tablet   Yes No   Sig: Take 1 mg by mouth   clonazePAM (KlonoPIN) 2 mg tablet   Yes No   Sig: TAKE 1 TABLET BY MOUTH NIGHTLY AS NEEDED FOR ANXIETY     desvenlafaxine succinate (PRISTIQ) 50 mg 24 hr tablet   Yes No   Sig: Take 50 mg by mouth daily   hydrocortisone-acetic acid (VOSOL-HC) otic solution   No No   Sig: Administer 3 drops into both ears 4 (four) times a day   Patient not taking: Reported on 8/23/2022   ketoconazole (NIZORAL) 2 % cream   No No   Sig: Apply topically daily   levothyroxine 75 mcg tablet   No No   Sig: Take 1 tablet (75 mcg total) by mouth daily   montelukast (SINGULAIR) 10 mg tablet   No No   Sig: Take 1 tablet (10 mg total) by mouth daily at bedtime   neomycin-polymyxin-hydrocortisone (CORTISPORIN) otic solution   No No   Sig: Administer 4 drops into both ears every 6 (six) hours   Patient not taking: Reported on 8/23/2022   nystatin (MYCOSTATIN) cream   Yes No   Sig: Apply 1 application topically 2 (two) times a day To affected area   Patient not taking: Reported on 8/23/2022   risperiDONE (RisperDAL) 4 mg tablet   Yes No   Sig: Take 1 5 tablets by mouth daily Take 1 5 tablets each evening   triamcinolone (KENALOG) 0 1 % cream   Yes No   Sig: Apply 1 application topically 2 (two) times a day To affected area    trihexyphenidyl (ARTANE) 5 mg tablet   Yes No   Sig: Take 10 mg by mouth      Facility-Administered Medications: None       Past Medical History:   Diagnosis Date   • Anxiety    • Bipolar disorder ([facility] [address] )    • Depression    • Disease of thyroid gland    • Gallbladder calculus    • Infectious viral hepatitis 2009    Hep C, treated   • Wears glasses        Past Surgical History:   Procedure Laterality Date   • LIVER BIOPSY      Last assessed: 7/1/15   • KS LAPAROSCOPY SURG CHOLECYSTECTOMY N/A 7/17/2019    Procedure: LAPAROSCOPIC CHOLECYSTECTOMY;  Surgeon: Sarah Casper MD;  Location: [facility];  Service: General   • TUBAL LIGATION         Family History   Problem Relation Age of Onset   • Breast cancer Mother 48   • Uterine cancer Mother 62   • Alcohol abuse Father         in recovery   • Bipolar disorder Father         current episode depressed, current episode severity unspecified   • COPD Father         w/acute bronchitis   • Depression Father    • Depression Sister    • No Known Problems Daughter    • Breast cancer Maternal Grandmother         unknown age   • No Known Problems Sister    • No Known Problems Maternal Aunt    • No Known Problems Paternal Aunt    • No Known Problems Cousin    • No Known Problems Daughter      I have reviewed and agree with the history as documented      E-Cigarette/Vaping   • E-Cigarette Use Never User      E-Cigarette/Vaping Substances   • Nicotine No    • THC No    • CBD No    • Flavoring No    • Other No      Social History     Tobacco Use   • Smoking status: Never   • Smokeless tobacco: Never   Vaping Use   • Vaping Use: Never used   Substance Use Topics   • Alcohol use: Yes     Comment: 2-3 times per week has 6+ vodka shots   • Drug use: Not Currently     Comment: 1 year of cocaine use 25 years ago, none now        Review of Systems   Constitutional: Negative for chills and fever  HENT: Negative for ear pain and sore throat  Eyes: Negative for pain and visual disturbance  Respiratory: Negative for cough and shortness of breath  Cardiovascular: Negative for chest pain and palpitations  Gastrointestinal: Negative for abdominal pain and vomiting  Genitourinary: Negative for dysuria and hematuria  Musculoskeletal: Negative for arthralgias and back pain  Skin: Negative for color change and rash  Neurological: Negative for seizures and syncope  All other systems reviewed and are negative  Physical Exam  ED Triage Vitals [02/06/23 0706]   Temperature Pulse Respirations Blood Pressure SpO2   98 3 °F (36 8 °C) 76 16 159/89 93 %      Temp Source Heart Rate Source Patient Position - Orthostatic VS BP Location FiO2 (%)   Oral Monitor Sitting Left arm --      Pain Score       8             Orthostatic Vital Signs  Vitals:    02/06/23 0706   BP: 159/89   Pulse: 76   Patient Position - Orthostatic VS: Sitting       Physical Exam  Vitals and nursing note reviewed  Constitutional:       General: She is not in acute distress  HENT:      Head: Normocephalic and atraumatic  Right Ear: External ear normal       Left Ear: External ear normal       Nose: Nose normal       Mouth/Throat:      Pharynx: Oropharynx is clear  Eyes:      Extraocular Movements: Extraocular movements intact  Pupils: Pupils are equal, round, and reactive to light  Cardiovascular:      Rate and Rhythm: Normal rate and regular rhythm  Pulses: Normal pulses  Heart sounds: Normal heart sounds  No murmur heard  No friction rub  No gallop  Pulmonary:      Effort: Pulmonary effort is normal  No respiratory distress  Breath sounds: Normal breath sounds  No wheezing, rhonchi or rales  Abdominal:      General: Abdomen is flat  There is no distension  Palpations: Abdomen is soft  Tenderness: There is no abdominal tenderness   There is no guarding or rebound  Musculoskeletal:         General: No deformity  Normal range of motion  Cervical back: Normal range of motion  Right lower leg: No edema  Left lower leg: No edema  Comments: Mild tenderness to right deltoid, no warmth or erythema, no fluctuance, neurovascularly intact   Skin:     General: Skin is warm and dry  Capillary Refill: Capillary refill takes less than 2 seconds  Findings: No rash  Neurological:      General: No focal deficit present  Mental Status: She is alert and oriented to person, place, and time  Gait: Gait normal    Psychiatric:         Mood and Affect: Mood normal          ED Medications  Medications   ibuprofen (MOTRIN) tablet 600 mg (600 mg Oral Given 2/6/23 0826)       Diagnostic Studies  Results Reviewed     None                 No orders to display         Procedures  Procedures      ED Course                                       Medical Decision Making  48 y/o F presenting with R arm pain for 3 weeks  Vitals reviewed  Examines as likely MSK deltoid pain  Will treat as MSK pain, discharge with PCP f/u and return precautions  Right arm pain: acute illness or injury  Risk  Prescription drug management  Disposition  Final diagnoses:   Right arm pain     Time reflects when diagnosis was documented in both MDM as applicable and the Disposition within this note     Time User Action Codes Description Comment    2/6/2023  8:22 AM Rupinder Sim Add [M79 601] Right arm pain       ED Disposition     ED Disposition   Discharge    Condition   Stable    Date/Time   Mon Feb 6, 2023  8:22 AM    Remi Faria discharge to home/self care                 Follow-up Information     Follow up With Specialties Details Why Contact Info Additional Information    Physical Therapy at [address] [place] [address] Physical Therapy Schedule an appointment as soon as possible for a visit   [address] [id number]  [phone] Physical Therapy at [id number] [place] [address], [address] [place] [address], [address] [place] [address] Internal Medicine Schedule an appointment as soon as possible for a visit   [address]  [phone]             Discharge Medication List as of 2/6/2023  8:24 AM      START taking these medications    Details   lidocaine (Lidoderm) 5 % Apply 1 patch topically over 12 hours daily Remove & Discard patch within 12 hours or as directed by MD, Starting Mon 2/6/2023, Normal         CONTINUE these medications which have NOT CHANGED    Details   Cholecalciferol (VITAMIN D3) 1000 units CAPS Take by mouth, Historical Med      clonazePAM (KlonoPIN) 2 mg tablet TAKE 1 TABLET BY MOUTH NIGHTLY AS NEEDED FOR ANXIETY , Historical Med      desvenlafaxine succinate (PRISTIQ) 50 mg 24 hr tablet Take 50 mg by mouth daily, Starting Wed 8/4/2021, Until Tue 8/23/2022, Historical Med      hydrocortisone-acetic acid (VOSOL-HC) otic solution Administer 3 drops into both ears 4 (four) times a day, Starting Wed 1/27/2021, Normal      ketoconazole (NIZORAL) 2 % cream Apply topically daily, Starting Mon 5/2/2022, Normal      levothyroxine 75 mcg tablet Take 1 tablet (75 mcg total) by mouth daily, Starting Tue 8/23/2022, Normal      montelukast (SINGULAIR) 10 mg tablet Take 1 tablet (10 mg total) by mouth daily at bedtime, Starting Tue 8/23/2022, Normal      neomycin-polymyxin-hydrocortisone (CORTISPORIN) otic solution Administer 4 drops into both ears every 6 (six) hours, Starting Mon 3/21/2022, Normal      nystatin (MYCOSTATIN) cream Apply 1 application topically 2 (two) times a day To affected area, Starting Fri 2/7/2020, Historical Med      risperiDONE (RisperDAL) 4 mg tablet Take 1 5 tablets by mouth daily Take 1 5 tablets each evening, Starting Tue 1/12/2021, Historical Med      triamcinolone (KENALOG) 0 1 % cream Apply 1 application topically 2 (two) times a day To affected area , Starting Fri 2/7/2020, Historical Med      trihexyphenidyl (ARTANE) 5 mg tablet Take 10 mg by mouth, Starting Tue 1/12/2021, Until Mon 4/12/2021, Historical Med      Vraylar 3 MG capsule Take 3 mg by mouth daily, Starting Tue 4/19/2022, Historical Med      clobetasol (TEMOVATE) 0 05 % ointment APPLY TO AFFECTED AREA TWICE A DAY, Normal               PDMP Review     None           ED Provider  Attending physically available and evaluated Yluisa Shirin ROBLEDO managed the patient along with the ED Attending      Electronically Signed by         Hank Ruiz MD  02/07/23 0439

## 2023-02-06 NOTE — DISCHARGE INSTRUCTIONS
Schedule an appointment with your PCP for close follow up  Referral placed for physical therapy which may be beneficial in helping your arm pain  Use the lidoderm patches as prescribed  If you develop new or worsening symptoms, please return to the Emergency Department for further evaluation

## 2023-02-28 NOTE — TELEPHONE ENCOUNTER
As a follow-up, a second attempt has been made for outreach via telephone call to facility  Please see Contacts section for details     Faxing document    Thank you  Dakotah Yanez MA

## 2023-03-01 NOTE — TELEPHONE ENCOUNTER
Upon review of the In Basket request we were able to locate, review, and update the patient chart as requested for Immunization(s) Tdap is in chart   scanning full vac document from [address] [place] [address]  Any additional questions or concerns should be emailed to the Practice Liaisons via the appropriate education email address, please do not reply via In Basket      Thank you  Ramon Motta MA

## 2023-03-13 ENCOUNTER — RA CDI HCC (OUTPATIENT)
Dept: OTHER | Facility: HOSPITAL | Age: 53
End: 2023-03-13

## 2023-03-17 ENCOUNTER — TELEPHONE (OUTPATIENT)
Dept: OTHER | Facility: OTHER | Age: 53
End: 2023-03-17

## 2023-03-17 NOTE — TELEPHONE ENCOUNTER
Patient is calling regarding cancelling an appointment  Date/Time:3/17/23 @ 10am    Patient was rescheduled: YES [] NO [x]    Patient requesting call back to reschedule: YES [] NO [x]    Pt has covid, will reschedule another time

## 2023-03-26 ENCOUNTER — HEALTH MAINTENANCE LETTER (OUTPATIENT)
Age: 53
End: 2023-03-26

## 2023-05-03 DIAGNOSIS — L30.9 ECZEMA, UNSPECIFIED TYPE: ICD-10-CM

## 2023-05-03 RX ORDER — CLOBETASOL PROPIONATE 0.5 MG/G
OINTMENT TOPICAL
Qty: 30 G | Refills: 0 | Status: SHIPPED | OUTPATIENT
Start: 2023-05-03

## 2023-07-19 ENCOUNTER — RA CDI HCC (OUTPATIENT)
Dept: OTHER | Facility: HOSPITAL | Age: 53
End: 2023-07-19

## 2023-07-25 ENCOUNTER — TELEPHONE (OUTPATIENT)
Dept: OTHER | Facility: OTHER | Age: 53
End: 2023-07-25

## 2023-07-25 NOTE — TELEPHONE ENCOUNTER
Patient is calling regarding cancelling an appointment.     Date/Time: Dr Patricia Cheng [facility]    7 25 2023     Patient was rescheduled: YES [] NO [x]    Patient requesting call back to reschedule: YES [x] NO []

## 2023-08-04 DIAGNOSIS — J30.1 NON-SEASONAL ALLERGIC RHINITIS DUE TO POLLEN: ICD-10-CM

## 2023-08-04 RX ORDER — MONTELUKAST SODIUM 10 MG/1
10 TABLET ORAL
Qty: 90 TABLET | Refills: 1 | Status: SHIPPED | OUTPATIENT
Start: 2023-08-04 | End: 2023-08-07 | Stop reason: SDUPTHER

## 2023-08-07 DIAGNOSIS — J30.1 NON-SEASONAL ALLERGIC RHINITIS DUE TO POLLEN: ICD-10-CM

## 2023-08-07 RX ORDER — MONTELUKAST SODIUM 10 MG/1
10 TABLET ORAL
Qty: 90 TABLET | Refills: 1 | Status: SHIPPED | OUTPATIENT
Start: 2023-08-07

## 2023-08-07 RX ORDER — MONTELUKAST SODIUM 10 MG/1
10 TABLET ORAL
Qty: 90 TABLET | Refills: 1 | Status: CANCELLED | OUTPATIENT
Start: 2023-08-07

## 2023-08-07 NOTE — TELEPHONE ENCOUNTER
Please resend a script for singulair to the [facility] on [address]. It was sent to the wrong pharmacy. Thank you.

## 2023-08-25 ENCOUNTER — OFFICE VISIT (OUTPATIENT)
Dept: INTERNAL MEDICINE CLINIC | Facility: CLINIC | Age: 53
End: 2023-08-25
Payer: MEDICARE

## 2023-08-25 ENCOUNTER — TELEPHONE (OUTPATIENT)
Dept: INTERNAL MEDICINE CLINIC | Facility: CLINIC | Age: 53
End: 2023-08-25

## 2023-08-25 VITALS
RESPIRATION RATE: 16 BRPM | SYSTOLIC BLOOD PRESSURE: 128 MMHG | HEART RATE: 77 BPM | WEIGHT: 172.6 LBS | BODY MASS INDEX: 31.76 KG/M2 | DIASTOLIC BLOOD PRESSURE: 82 MMHG | OXYGEN SATURATION: 98 % | HEIGHT: 62 IN

## 2023-08-25 DIAGNOSIS — Z13.6 SCREENING FOR CARDIOVASCULAR CONDITION: ICD-10-CM

## 2023-08-25 DIAGNOSIS — Z12.31 ENCOUNTER FOR SCREENING MAMMOGRAM FOR BREAST CANCER: Primary | ICD-10-CM

## 2023-08-25 DIAGNOSIS — Z12.11 SCREENING FOR MALIGNANT NEOPLASM OF COLON: ICD-10-CM

## 2023-08-25 DIAGNOSIS — E66.09 CLASS 1 OBESITY DUE TO EXCESS CALORIES WITHOUT SERIOUS COMORBIDITY WITH BODY MASS INDEX (BMI) OF 33.0 TO 33.9 IN ADULT: ICD-10-CM

## 2023-08-25 DIAGNOSIS — F33.9 MAJOR DEPRESSION, RECURRENT, CHRONIC (HCC): ICD-10-CM

## 2023-08-25 DIAGNOSIS — Z00.00 MEDICARE ANNUAL WELLNESS VISIT, SUBSEQUENT: ICD-10-CM

## 2023-08-25 DIAGNOSIS — E03.9 HYPOTHYROIDISM, UNSPECIFIED TYPE: ICD-10-CM

## 2023-08-25 DIAGNOSIS — F10.21 ALCOHOL DEPENDENCE IN REMISSION (HCC): ICD-10-CM

## 2023-08-25 DIAGNOSIS — R21 RASH: ICD-10-CM

## 2023-08-25 DIAGNOSIS — Z13.1 SCREENING FOR DIABETES MELLITUS: ICD-10-CM

## 2023-08-25 DIAGNOSIS — J30.1 NON-SEASONAL ALLERGIC RHINITIS DUE TO POLLEN: ICD-10-CM

## 2023-08-25 PROCEDURE — G0439 PPPS, SUBSEQ VISIT: HCPCS | Performed by: INTERNAL MEDICINE

## 2023-08-25 PROCEDURE — 99214 OFFICE O/P EST MOD 30 MIN: CPT | Performed by: INTERNAL MEDICINE

## 2023-08-25 RX ORDER — FLUTICASONE PROPIONATE 50 MCG
2 SPRAY, SUSPENSION (ML) NASAL DAILY
Qty: 1 G | Refills: 0 | Status: SHIPPED | OUTPATIENT
Start: 2023-08-25

## 2023-08-25 RX ORDER — DIAPER,BRIEF,INFANT-TODD,DISP
EACH MISCELLANEOUS DAILY PRN
Qty: 15 G | Refills: 0 | Status: SHIPPED | OUTPATIENT
Start: 2023-08-25

## 2023-08-25 RX ORDER — LEVOTHYROXINE SODIUM 0.07 MG/1
75 TABLET ORAL DAILY
Qty: 90 TABLET | Refills: 1 | Status: SHIPPED | OUTPATIENT
Start: 2023-08-25

## 2023-08-25 RX ORDER — MONTELUKAST SODIUM 10 MG/1
10 TABLET ORAL
Qty: 90 TABLET | Refills: 1 | Status: SHIPPED | OUTPATIENT
Start: 2023-08-25

## 2023-08-25 NOTE — PATIENT INSTRUCTIONS
Medicare Preventive Visit Patient Instructions  Thank you for completing your Welcome to Medicare Visit or Medicare Annual Wellness Visit today. Your next wellness visit will be due in one year (8/25/2024). The screening/preventive services that you may require over the next 5-10 years are detailed below. Some tests may not apply to you based off risk factors and/or age. Screening tests ordered at today's visit but not completed yet may show as past due. Also, please note that scanned in results may not display below. Preventive Screenings:  Service Recommendations Previous Testing/Comments   Colorectal Cancer Screening  * Colonoscopy    * Fecal Occult Blood Test (FOBT)/Fecal Immunochemical Test (FIT)  * Fecal DNA/Cologuard Test  * Flexible Sigmoidoscopy Age: 43-73 years old   Colonoscopy: every 10 years (may be performed more frequently if at higher risk)  OR  FOBT/FIT: every 1 year  OR  Cologuard: every 3 years  OR  Sigmoidoscopy: every 5 years  Screening may be recommended earlier than age 39 if at higher risk for colorectal cancer. Also, an individualized decision between you and your healthcare provider will decide whether screening between the ages of 77-80 would be appropriate. Colonoscopy: Not on file  FOBT/FIT: Not on file  Cologuard: Not on file  Sigmoidoscopy: Not on file          Breast Cancer Screening Age: 36 years old  Frequency: every 1-2 years  Not required if history of left and right mastectomy Mammogram: 04/26/2022        Cervical Cancer Screening Between the ages of 21-29, pap smear recommended once every 3 years. Between the ages of 32-69, can perform pap smear with HPV co-testing every 5 years.    Recommendations may differ for women with a history of total hysterectomy, cervical cancer, or abnormal pap smears in past. Pap Smear: 11/24/2020        Hepatitis C Screening Once for adults born between 1945 and 1965  More frequently in patients at high risk for Hepatitis C Hep C Antibody: 06/25/2021        Diabetes Screening 1-2 times per year if you're at risk for diabetes or have pre-diabetes Fasting glucose: 90 mg/dL (9/19/2022)  A1C: 5.2 % (6/25/2021)      Cholesterol Screening Once every 5 years if you don't have a lipid disorder. May order more often based on risk factors. Lipid panel: 09/19/2022          Other Preventive Screenings Covered by Medicare:  Abdominal Aortic Aneurysm (AAA) Screening: covered once if your at risk. You're considered to be at risk if you have a family history of AAA. Lung Cancer Screening: covers low dose CT scan once per year if you meet all of the following conditions: (1) Age 48-67; (2) No signs or symptoms of lung cancer; (3) Current smoker or have quit smoking within the last 15 years; (4) You have a tobacco smoking history of at least 20 pack years (packs per day multiplied by number of years you smoked); (5) You get a written order from a healthcare provider. Glaucoma Screening: covered annually if you're considered high risk: (1) You have diabetes OR (2) Family history of glaucoma OR (3)  aged 48 and older OR (3)  American aged 72 and older  Osteoporosis Screening: covered every 2 years if you meet one of the following conditions: (1) You're estrogen deficient and at risk for osteoporosis based off medical history and other findings; (2) Have a vertebral abnormality; (3) On glucocorticoid therapy for more than 3 months; (4) Have primary hyperparathyroidism; (5) On osteoporosis medications and need to assess response to drug therapy. Last bone density test (DXA Scan): Not on file. HIV Screening: covered annually if you're between the age of 14-79. Also covered annually if you are younger than 13 and older than 72 with risk factors for HIV infection. For pregnant patients, it is covered up to 3 times per pregnancy.     Immunizations:  Immunization Recommendations   Influenza Vaccine Annual influenza vaccination during flu season is recommended for all persons aged >= 6 months who do not have contraindications   Pneumococcal Vaccine   * Pneumococcal conjugate vaccine = PCV13 (Prevnar 13), PCV15 (Vaxneuvance), PCV20 (Prevnar 20)  * Pneumococcal polysaccharide vaccine = PPSV23 (Pneumovax) Adults 2364 years old: 1-3 doses may be recommended based on certain risk factors  Adults 72 years old: 1-2 doses may be recommended based off what pneumonia vaccine you previously received   Hepatitis B Vaccine 3 dose series if at intermediate or high risk (ex: diabetes, end stage renal disease, liver disease)   Tetanus (Td) Vaccine - COST NOT COVERED BY MEDICARE PART B Following completion of primary series, a booster dose should be given every 10 years to maintain immunity against tetanus. Td may also be given as tetanus wound prophylaxis. Tdap Vaccine - COST NOT COVERED BY MEDICARE PART B Recommended at least once for all adults. For pregnant patients, recommended with each pregnancy. Shingles Vaccine (Shingrix) - COST NOT COVERED BY MEDICARE PART B  2 shot series recommended in those aged 48 and above     Health Maintenance Due:      Topic Date Due    Colorectal Cancer Screening  Never done    Breast Cancer Screening: Mammogram  03/25/2023    Cervical Cancer Screening  11/24/2025    HIV Screening  Completed    Hepatitis C Screening  Discontinued     Immunizations Due:      Topic Date Due    Influenza Vaccine (1) 09/01/2023     Advance Directives   What are advance directives? Advance directives are legal documents that state your wishes and plans for medical care. These plans are made ahead of time in case you lose your ability to make decisions for yourself. Advance directives can apply to any medical decision, such as the treatments you want, and if you want to donate organs. What are the types of advance directives? There are many types of advance directives, and each state has rules about how to use them.  You may choose a combination of any of the following:  Living will: This is a written record of the treatment you want. You can also choose which treatments you do not want, which to limit, and which to stop at a certain time. This includes surgery, medicine, IV fluid, and tube feedings. Durable power of  for healthcare [facility]): This is a written record that states who you want to make healthcare choices for you when you are unable to make them for yourself. This person, called a proxy, is usually a family member or a friend. You may choose more than 1 proxy. Do not resuscitate (DNR) order:  A DNR order is used in case your heart stops beating or you stop breathing. It is a request not to have certain forms of treatment, such as CPR. A DNR order may be included in other types of advance directives. Medical directive: This covers the care that you want if you are in a coma, near death, or unable to make decisions for yourself. You can list the treatments you want for each condition. Treatment may include pain medicine, surgery, blood transfusions, dialysis, IV or tube feedings, and a ventilator (breathing machine). Values history: This document has questions about your views, beliefs, and how you feel and think about life. This information can help others choose the care that you would choose. Why are advance directives important? An advance directive helps you control your care. Although spoken wishes may be used, it is better to have your wishes written down. Spoken wishes can be misunderstood, or not followed. Treatments may be given even if you do not want them. An advance directive may make it easier for your family to make difficult choices about your care. © Copyright [employer] 2018 Information is for End User's use only and may not be sold, redistributed or otherwise used for commercial purposes.  All illustrations and images included in CareNotes® are the copyrighted property of [employer] or [employer] Â® [employer]  Hydrocortisone 0.5% once a day prn (max 3 day)

## 2023-08-25 NOTE — TELEPHONE ENCOUNTER
Patient is asking if you can enter an order for the following dentist, Skylar Peter.   She is affiliated with [facility].

## 2023-08-25 NOTE — PROGRESS NOTES
Assessment and Plan:     Problem List Items Addressed This Visit        Endocrine    Hypothyroidism     Hypothyroidism controlled the patient is currently euthyroid I will be ordering a TSH prior to the next office visit and the patient will continue with current medical regiment; we will continue to monitor the patient's progress.   Continue levothyroxine 75 mcg once daily         Relevant Medications    levothyroxine 75 mcg tablet    fluticasone (FLONASE) 50 mcg/act nasal spray    Other Relevant Orders    Ambulatory Referral to Allergy    TSH, 3rd generation       Respiratory    Non-seasonal allergic rhinitis due to pollen     We will order allergist evaluation patient does report to me there are cats in her house for which she is allergic she may qualify for allergy shots for now she can continue Zyrtec 10 mg once daily, will prescribe Flonase 2 sprays each nostril once daily and continue Singulair 10 mg once daily         Relevant Medications    montelukast (SINGULAIR) 10 mg tablet       Musculoskeletal and Integument    Rash     Eczema around the eyes and also external ear she may use hydrocortisone 0.5% cream apply once a day small amount up to 3 max as needed we will have patient see dermatologist use moisturizer routinely         Relevant Medications    hydrocortisone 0.5 % cream    Other Relevant Orders    Ambulatory Referral to Dermatology       Other    Alcohol dependence ([address])     Patient does report to me drinking minimally I did explain to the patient she should not drink alcohol patient declines a formal program she will continue to work on stopping alcohol and limiting we will continue to monitor         Encounter for screening mammogram for breast cancer - Primary     We will order the screening mammography         Relevant Orders    Mammo screening bilateral w 3d & cad    Screening for cardiovascular condition     Counseled/check lipid profile         Relevant Orders    Comprehensive metabolic panel    Lipid Panel with Direct LDL reflex    Class 1 obesity due to excess calories without serious comorbidity in adult     Obesity -I have counseled patient following healthy and balanced diet, I would like the patient to lose weight, I would like the patient exercise routinely; we will continue monitor the patient's progress. Major depression, recurrent, chronic ([address])     No SI working with psychiatry currently on [employer] annual wellness visit, subsequent     Assessment and plan 1. Medicare subsequent annual wellness examination overall the patient is clinically stable and doing well, we encouraged the patient to follow a healthy and balanced diet. We recommend that the patient exercise routinely approximately 30 minutes 5 times per week . We have reviewed the patient's vaccines and have made recommendations for updates if necessary   annual flu shot in the fall. We will be ordering screening laboratories which are age appropriate. Return to the office in   4 months   call if any problems. Other Visit Diagnoses     Screening for malignant neoplasm of colon        Relevant Orders    Ambulatory Referral to Gastroenterology    Screening for diabetes mellitus        Relevant Orders    Hemoglobin A1C      itching and flaking of the ear, itchy and sore 6-12months external ears  BMI Counseling: Body mass index is 31.57 kg/m². The BMI is above normal. Nutrition recommendations include decreasing portion sizes and moderation in carbohydrate intake. Exercise recommendations include exercising 3-5 times per week. Rationale for BMI follow-up plan is due to patient being overweight or obese. Preventive health issues were discussed with patient, and age appropriate screening tests were ordered as noted in patient's After Visit Summary.   Personalized health advice and appropriate referrals for health education or preventive services given if needed, as noted in patient's After Visit Summary. History of Present Illness:     Patient presents for a Medicare Wellness Visit    HPI 54-year old female coming in for a follow up office visit regarding hypothyroidism, allergies, major depression, alcohol dependence, rash around the eyes and external ears, obesity; the patient reports me compliant taking medications without untoward side effects the. The patient is here to review his medical condition, update me on the medical condition and the patient reports me no hospitalizations and no ER visits. No injuries no illnesses she reports me ongoing depression no SI working with psychiatry. She also reports me allergies she would like to see an allergist she she reports me there are cats in her home. She also reports me a rash around her eyes which is itchy mild and also a rash Exterol ears. Flaking of the rash. Trying to follow healthy diet would like to see a dentist  Patient Care Team:  Laura Shankar DO as PCP - General  Dennys Evans DO     Review of Systems:     Review of Systems   Constitutional: Negative for activity change, appetite change and unexpected weight change. HENT: Negative for congestion and postnasal drip. Eyes: Negative for visual disturbance. Respiratory: Negative for cough and shortness of breath. Cardiovascular: Negative for chest pain. Gastrointestinal: Negative for abdominal pain, diarrhea, nausea and vomiting. Neurological: Negative for dizziness, light-headedness and headaches. Hematological: Negative for adenopathy.         Problem List:     Patient Active Problem List   Diagnosis   • Anxiety   • Alcohol dependence ([address])   • Acute hepatitis C virus infection   • Hypothyroidism   • Fatigue   • Wellness examination   • Actinic otitis externa   • Encounter for screening mammogram for breast cancer   • Encounter for gynecological examination without abnormal finding   • Screening for cardiovascular condition   • Class 1 obesity due to excess calories without serious comorbidity in adult   • Screening cholesterol level   • Postoperative visit   • Class 2 drug-induced obesity without serious comorbidity with body mass index (BMI) of 36.0 to 36.9 in adult   • Depression   • Eczema   • Family history of breast cancer   • Acute folliculitis   • Rash   • Iron deficiency   • Toe injury   • Primary insomnia   • Non-seasonal allergic rhinitis due to pollen   • Upper respiratory tract infection   • Yeast infection   • Financial difficulties   • Hyperbilirubinemia   • Chronic JEREMY (middle ear effusion), right   • Major depression, recurrent, chronic (HCC)   • Medicare annual wellness visit, subsequent      Past Medical and Surgical History:     Past Medical History:   Diagnosis Date   • Anxiety    • Bipolar disorder ([address])    • Depression    • Disease of thyroid gland    • Gallbladder calculus    • Infectious viral hepatitis 2009    Hep C, treated   • Wears glasses      Past Surgical History:   Procedure Laterality Date   • LIVER BIOPSY      Last assessed: 7/1/15   • NC LAPAROSCOPY SURG CHOLECYSTECTOMY N/A 7/17/2019    Procedure: LAPAROSCOPIC CHOLECYSTECTOMY;  Surgeon: Diandra Manuel MD;  Location: [facility];  Service: General   • TUBAL LIGATION        Family History:     Family History   Problem Relation Age of Onset   • Breast cancer Mother 48   • Uterine cancer Mother 62   • Alcohol abuse Father         in recovery   • Bipolar disorder Father         current episode depressed, current episode severity unspecified   • COPD Father         w/acute bronchitis   • Depression Father    • Depression Sister    • No Known Problems Daughter    • Breast cancer Maternal Grandmother         unknown age   • No Known Problems Sister    • No Known Problems Maternal Aunt    • No Known Problems Paternal Aunt    • No Known Problems Cousin    • No Known Problems Daughter       Social History:     Social History     Socioeconomic History   • Marital status: Single     Spouse name: None   • Number of children: None   • Years of education: None   • Highest education level: None   Occupational History   • None   Tobacco Use   • Smoking status: Never   • Smokeless tobacco: Never   Vaping Use   • Vaping Use: Never used   Substance and Sexual Activity   • Alcohol use: Yes     Comment: 2-3 times per week has 6+ vodka shots   • Drug use: Not Currently     Comment: 1 year of cocaine use 25 years ago, none now   • Sexual activity: Yes   Other Topics Concern   • None   Social History Narrative    Bereavement     Social Determinants of Health     Financial Resource Strain: Low Risk  (8/25/2023)    Overall Financial Resource Strain (CARDIA)    • Difficulty of Paying Living Expenses: Not hard at all   Food Insecurity: Not on file   Transportation Needs: No Transportation Needs (8/25/2023)    PRAPARE - Transportation    • Lack of Transportation (Medical): No    • Lack of Transportation (Non-Medical): No   Physical Activity: Not on file   Stress: Not on file   Social Connections: Not on file   Intimate Partner Violence: Not on file   Housing Stability: Not on file      Medications and Allergies:     Current Outpatient Medications   Medication Sig Dispense Refill   • Cholecalciferol (VITAMIN D3) 1000 units CAPS Take by mouth     • clobetasol (TEMOVATE) 0.05 % ointment APPLY TO AFFECTED AREA TWICE A DAY 30 g 0   • clonazePAM (KlonoPIN) 2 mg tablet TAKE 1 TABLET BY MOUTH NIGHTLY AS NEEDED FOR ANXIETY.      • fluticasone (FLONASE) 50 mcg/act nasal spray 2 sprays into each nostril daily 1 g 0   • hydrocortisone 0.5 % cream Apply topically daily as needed for rash 15 g 0   • ketoconazole (NIZORAL) 2 % cream Apply topically daily 15 g 0   • levothyroxine 75 mcg tablet Take 1 tablet (75 mcg total) by mouth daily 90 tablet 1   • lidocaine (Lidoderm) 5 % Apply 1 patch topically over 12 hours daily Remove & Discard patch within 12 hours or as directed by MD 15 patch 0   • metroNIDAZOLE (METROGEL) 0.75 % vaginal gel • montelukast (SINGULAIR) 10 mg tablet Take 1 tablet (10 mg total) by mouth daily at bedtime 90 tablet 1   • risperiDONE (RisperDAL) 4 mg tablet Take 1.5 tablets by mouth daily Take 1.5 tablets each evening     • triamcinolone (KENALOG) 0.1 % cream Apply 1 application topically 2 (two) times a day To affected area      • Vraylar 3 MG capsule Take 3 mg by mouth daily     • desvenlafaxine succinate (PRISTIQ) 50 mg 24 hr tablet Take 50 mg by mouth daily     • hydrocortisone-acetic acid (VOSOL-HC) otic solution Administer 3 drops into both ears 4 (four) times a day (Patient not taking: Reported on 8/23/2022) 10 mL 3   • neomycin-polymyxin-hydrocortisone (CORTISPORIN) otic solution Administer 4 drops into both ears every 6 (six) hours (Patient not taking: Reported on 8/23/2022) 10 mL 0   • nystatin (MYCOSTATIN) cream Apply 1 application topically 2 (two) times a day To affected area (Patient not taking: Reported on 8/23/2022)     • trihexyphenidyl (ARTANE) 5 mg tablet Take 10 mg by mouth       No current facility-administered medications for this visit.      Allergies   Allergen Reactions   • Citalopram Hives     Reaction Date: 27Feb2012;    • Lamotrigine Hives      Immunizations:     Immunization History   Administered Date(s) Administered   • COVID-19 MODERNA VACC 0.5 ML IM 05/07/2021, 06/04/2021, 11/26/2021   • COVID-19 Moderna Vac BIVALENT 12 Yr+ IM (BOOSTER ONLY) 0.5 ML 10/31/2022   • Hep A / Hep B 12/05/2008, 01/12/2009, 06/15/2009   • Hep A, adult 12/05/2008, 01/12/2009, 06/15/2009   • Hep B, adult 12/05/2008, 01/12/2009, 06/15/2009   • INFLUENZA 11/11/2008, 10/15/2013, 09/17/2014, 10/01/2015, 08/27/2018, 08/26/2020, 09/22/2021, 09/13/2022   • Influenza Quadrivalent Preservative Free 3 years and older IM 09/16/2016, 09/07/2017   • Influenza, injectable, quadrivalent, preservative free 0.5 mL 08/17/2019, 08/26/2020   • Tdap 05/29/2018, 01/18/2023      Health Maintenance:         Topic Date Due   • Colorectal Cancer Screening  Never done   • Breast Cancer Screening: Mammogram  03/25/2023   • Cervical Cancer Screening  11/24/2025   • HIV Screening  Completed   • Hepatitis C Screening  Discontinued         Topic Date Due   • Influenza Vaccine (1) 09/01/2023      Medicare Screening Tests and Risk Assessments:     Renny Archer is here for her Subsequent Wellness visit. Health Risk Assessment:   Patient rates overall health as good. Patient feels that their physical health rating is same. Patient is satisfied with their life. Eyesight was rated as same. Hearing was rated as same. Patient feels that their emotional and mental health rating is same. Patients states they are never, rarely angry. Patient states they are never, rarely unusually tired/fatigued. Pain experienced in the last 7 days has been none. Patient states that she has experienced no weight loss or gain in last 6 months. Depression Screening:   PHQ-9 Score: 0      Fall Risk Screening: In the past year, patient has experienced: no history of falling in past year      Urinary Incontinence Screening:   Patient has not leaked urine accidently in the last six months. Home Safety:  Patient does not have trouble with stairs inside or outside of their home. Patient has working smoke alarms and has working carbon monoxide detector. Home safety hazards include: none. Nutrition:   Current diet is Regular. Medications:   Patient is currently taking over-the-counter supplements. OTC medications include: see medication list. Patient is able to manage medications. Activities of Daily Living (ADLs)/Instrumental Activities of Daily Living (IADLs):   Walk and transfer into and out of bed and chair?: Yes  Dress and groom yourself?: Yes    Bathe or shower yourself?: Yes    Feed yourself?  Yes  Do your laundry/housekeeping?: Yes  Manage your money, pay your bills and track your expenses?: Yes  Make your own meals?: Yes    Do your own shopping?: Yes    Previous Hospitalizations:   Any hospitalizations or ED visits within the last 12 months?: No      Advance Care Planning:   Living will: No    Durable POA for healthcare: No    Advanced directive: No      Cognitive Screening:   Provider or family/friend/caregiver concerned regarding cognition?: No    PREVENTIVE SCREENINGS      Cardiovascular Screening:    General: Screening Current      Diabetes Screening:     General: Screening Current      Breast Cancer Screening:     General: Screening Current      Cervical Cancer Screening:    General: Screening Current      Lung Cancer Screening:     General: Screening Not Indicated      Hepatitis C Screening:    General: Screening Not Indicated and History Hepatitis C    Screening, Brief Intervention, and Referral to Treatment (SBIRT)    Screening  Typical number of drinks in a day: 0  Typical number of drinks in a week: 6  Interpretation: Low risk drinking behavior. Single Item Drug Screening:  How often have you used an illegal drug (including marijuana) or a prescription medication for non-medical reasons in the past year? never    Single Item Drug Screen Score: 0  Interpretation: Negative screen for possible drug use disorder    No results found. Physical Exam:     /82   Pulse 77   Resp 16   Ht 5' 2" (1.575 m)   Wt 78.3 kg (172 lb 9.6 oz)   SpO2 98%   BMI 31.57 kg/m²     Physical Exam  Vitals and nursing note reviewed. Constitutional:       General: She is not in acute distress. Appearance: Normal appearance. She is well-developed. She is obese. She is not ill-appearing, toxic-appearing or diaphoretic. HENT:      Head: Normocephalic and atraumatic. Right Ear: External ear normal.      Left Ear: External ear normal.      Nose: Nose normal.   Eyes:      Pupils: Pupils are equal, round, and reactive to light. Cardiovascular:      Rate and Rhythm: Normal rate and regular rhythm. Heart sounds: Normal heart sounds. No murmur heard.   Pulmonary: Effort: Pulmonary effort is normal.      Breath sounds: Normal breath sounds. Abdominal:      General: There is no distension. Palpations: Abdomen is soft. Tenderness: There is no abdominal tenderness. There is no guarding. Psychiatric:         Mood and Affect: Mood is depressed. Mood is not anxious. Thought Content: Thought content does not include suicidal ideation.      Mild eczema around bilateral eyes also external ears  Elvira Goods, DO

## 2023-08-27 PROBLEM — Z00.00 MEDICARE ANNUAL WELLNESS VISIT, SUBSEQUENT: Status: ACTIVE | Noted: 2023-08-27

## 2023-08-27 NOTE — ASSESSMENT & PLAN NOTE
Assessment and plan 1. Medicare subsequent annual wellness examination overall the patient is clinically stable and doing well, we encouraged the patient to follow a healthy and balanced diet. We recommend that the patient exercise routinely approximately 30 minutes 5 times per week . We have reviewed the patient's vaccines and have made recommendations for updates if necessary   annual flu shot in the fall. We will be ordering screening laboratories which are age appropriate. Return to the office in   4 months   call if any problems.

## 2023-08-27 NOTE — ASSESSMENT & PLAN NOTE
Hypothyroidism controlled the patient is currently euthyroid I will be ordering a TSH prior to the next office visit and the patient will continue with current medical regiment; we will continue to monitor the patient's progress.   Continue levothyroxine 75 mcg once daily

## 2023-08-27 NOTE — ASSESSMENT & PLAN NOTE
Patient does report to me drinking minimally I did explain to the patient she should not drink alcohol patient declines a formal program she will continue to work on stopping alcohol and limiting we will continue to monitor

## 2023-08-27 NOTE — ASSESSMENT & PLAN NOTE
We will order allergist evaluation patient does report to me there are cats in her house for which she is allergic she may qualify for allergy shots for now she can continue Zyrtec 10 mg once daily, will prescribe Flonase 2 sprays each nostril once daily and continue Singulair 10 mg once daily

## 2023-08-27 NOTE — ASSESSMENT & PLAN NOTE
Eczema around the eyes and also external ear she may use hydrocortisone 0.5% cream apply once a day small amount up to 3 max as needed we will have patient see dermatologist use moisturizer routinely

## 2023-08-29 ENCOUNTER — TELEPHONE (OUTPATIENT)
Dept: INTERNAL MEDICINE CLINIC | Facility: CLINIC | Age: 53
End: 2023-08-29

## 2023-08-29 ENCOUNTER — APPOINTMENT (OUTPATIENT)
Dept: LAB | Facility: CLINIC | Age: 53
End: 2023-08-29
Payer: MEDICARE

## 2023-08-29 DIAGNOSIS — Z13.6 SCREENING FOR CARDIOVASCULAR CONDITION: ICD-10-CM

## 2023-08-29 DIAGNOSIS — Z13.1 SCREENING FOR DIABETES MELLITUS: ICD-10-CM

## 2023-08-29 DIAGNOSIS — E03.9 HYPOTHYROIDISM, UNSPECIFIED TYPE: ICD-10-CM

## 2023-08-29 LAB
ALBUMIN SERPL BCP-MCNC: 4.3 G/DL (ref 3.5–5)
ALP SERPL-CCNC: 90 U/L (ref 34–104)
ALT SERPL W P-5'-P-CCNC: 35 U/L (ref 7–52)
ANION GAP SERPL CALCULATED.3IONS-SCNC: 10 MMOL/L
AST SERPL W P-5'-P-CCNC: 41 U/L (ref 13–39)
BILIRUB SERPL-MCNC: 0.97 MG/DL (ref 0.2–1)
BUN SERPL-MCNC: 13 MG/DL (ref 5–25)
CALCIUM SERPL-MCNC: 9.2 MG/DL (ref 8.4–10.2)
CHLORIDE SERPL-SCNC: 104 MMOL/L (ref 96–108)
CHOLEST SERPL-MCNC: 174 MG/DL
CO2 SERPL-SCNC: 26 MMOL/L (ref 21–32)
CREAT SERPL-MCNC: 0.73 MG/DL (ref 0.6–1.3)
EST. AVERAGE GLUCOSE BLD GHB EST-MCNC: 105 MG/DL
GFR SERPL CREATININE-BSD FRML MDRD: 94 ML/MIN/1.73SQ M
GLUCOSE P FAST SERPL-MCNC: 87 MG/DL (ref 65–99)
HBA1C MFR BLD: 5.3 %
HDLC SERPL-MCNC: 76 MG/DL
LDLC SERPL CALC-MCNC: 91 MG/DL (ref 0–100)
POTASSIUM SERPL-SCNC: 4.6 MMOL/L (ref 3.5–5.3)
PROT SERPL-MCNC: 7.3 G/DL (ref 6.4–8.4)
SODIUM SERPL-SCNC: 140 MMOL/L (ref 135–147)
TRIGL SERPL-MCNC: 35 MG/DL
TSH SERPL DL<=0.05 MIU/L-ACNC: 1.8 UIU/ML (ref 0.45–4.5)

## 2023-08-29 PROCEDURE — 83036 HEMOGLOBIN GLYCOSYLATED A1C: CPT

## 2023-08-29 PROCEDURE — 80053 COMPREHEN METABOLIC PANEL: CPT

## 2023-08-29 PROCEDURE — 36415 COLL VENOUS BLD VENIPUNCTURE: CPT

## 2023-08-29 PROCEDURE — 80061 LIPID PANEL: CPT

## 2023-08-29 PROCEDURE — 84443 ASSAY THYROID STIM HORMONE: CPT

## 2023-08-29 NOTE — TELEPHONE ENCOUNTER
Patient called back again asking if you can put in a referral for the following dentist, Vita Avery. She is affiliated with [facility]. Also patient would like another referral for a colonoscopy she was given one but for the [facility] area and she does not drive to [facility]. She would like someone in the [facility] area. Patient would like a call back with a name and number of the referred DrAliyah for the colonoscopy. Patient would also like the referrals to be mailed to her. Please advise.

## 2023-08-29 NOTE — TELEPHONE ENCOUNTER
I am unable to find this provider locally. Spoke to patient and advised. She will call back with contact #.

## 2023-08-30 DIAGNOSIS — Z12.11 SCREENING FOR COLON CANCER: Primary | ICD-10-CM

## 2023-08-30 DIAGNOSIS — R74.8 ABNORMAL LIVER ENZYMES: Primary | ICD-10-CM

## 2023-08-30 NOTE — TELEPHONE ENCOUNTER
Patient is calling in regarding the referral she is requesting for a doctor located in [facility] so she can have her colonoscopy done. Patient would like this mailed to her as well as a phone call letting her know it has been entered.

## 2023-09-16 DIAGNOSIS — E03.9 HYPOTHYROIDISM, UNSPECIFIED TYPE: ICD-10-CM

## 2023-09-18 RX ORDER — FLUTICASONE PROPIONATE 50 MCG
SPRAY, SUSPENSION (ML) NASAL
Qty: 48 ML | Refills: 1 | Status: SHIPPED | OUTPATIENT
Start: 2023-09-18

## 2023-09-23 DIAGNOSIS — R21 RASH: ICD-10-CM

## 2023-09-25 RX ORDER — DIAPER,BRIEF,INFANT-TODD,DISP
EACH MISCELLANEOUS DAILY PRN
Qty: 28.4 G | Refills: 0 | Status: SHIPPED | OUTPATIENT
Start: 2023-09-25

## 2023-10-04 ENCOUNTER — TELEPHONE (OUTPATIENT)
Dept: OTHER | Facility: OTHER | Age: 53
End: 2023-10-04

## 2023-10-07 ENCOUNTER — IMMUNIZATIONS (OUTPATIENT)
Dept: INTERNAL MEDICINE CLINIC | Facility: CLINIC | Age: 53
End: 2023-10-07
Payer: COMMERCIAL

## 2023-10-07 DIAGNOSIS — Z23 ENCOUNTER FOR IMMUNIZATION: Primary | ICD-10-CM

## 2023-10-07 PROCEDURE — 90471 IMMUNIZATION ADMIN: CPT

## 2023-10-07 PROCEDURE — 90662 IIV NO PRSV INCREASED AG IM: CPT

## 2023-10-09 ENCOUNTER — TRANSFERRED RECORDS (OUTPATIENT)
Dept: HEALTH INFORMATION MANAGEMENT | Facility: CLINIC | Age: 53
End: 2023-10-09
Payer: COMMERCIAL

## 2023-10-26 PROBLEM — Z00.00 MEDICARE ANNUAL WELLNESS VISIT, SUBSEQUENT: Status: RESOLVED | Noted: 2023-08-27 | Resolved: 2023-10-26

## 2023-11-28 ENCOUNTER — TELEPHONE (OUTPATIENT)
Dept: ONCOLOGY | Facility: HOSPITAL | Age: 53
End: 2023-11-28
Payer: COMMERCIAL

## 2023-12-15 DIAGNOSIS — R21 RASH: ICD-10-CM

## 2023-12-15 RX ORDER — DIAPER,BRIEF,INFANT-TODD,DISP
EACH MISCELLANEOUS DAILY PRN
Qty: 28.4 G | Refills: 0 | Status: SHIPPED | OUTPATIENT
Start: 2023-12-15

## 2023-12-26 DIAGNOSIS — E03.9 HYPOTHYROIDISM, UNSPECIFIED TYPE: ICD-10-CM

## 2023-12-27 RX ORDER — FLUTICASONE PROPIONATE 50 MCG
SPRAY, SUSPENSION (ML) NASAL
Qty: 48 ML | Refills: 1 | Status: SHIPPED | OUTPATIENT
Start: 2023-12-27

## 2023-12-27 RX ORDER — LEVOTHYROXINE SODIUM 0.07 MG/1
75 TABLET ORAL DAILY
Qty: 90 TABLET | Refills: 1 | Status: SHIPPED | OUTPATIENT
Start: 2023-12-27

## 2024-01-22 ENCOUNTER — TELEPHONE (OUTPATIENT)
Dept: ONCOLOGY | Facility: HOSPITAL | Age: 54
End: 2024-01-22
Payer: COMMERCIAL

## 2024-01-22 ENCOUNTER — PATIENT OUTREACH (OUTPATIENT)
Dept: ONCOLOGY | Facility: HOSPITAL | Age: 54
End: 2024-01-22
Payer: COMMERCIAL

## 2024-01-22 NOTE — PROGRESS NOTES
[facility]: Cancer Care                                                                                          Patient had to switch providers as her insurance changed.  She will not be following up here.    Nidia Murray RN

## 2024-02-21 PROBLEM — Z13.6 SCREENING FOR CARDIOVASCULAR CONDITION: Status: RESOLVED | Noted: 2019-06-24 | Resolved: 2024-02-21

## 2024-02-21 PROBLEM — Z01.419 ENCOUNTER FOR GYNECOLOGICAL EXAMINATION WITHOUT ABNORMAL FINDING: Status: RESOLVED | Noted: 2019-06-24 | Resolved: 2024-02-21

## 2024-03-01 DIAGNOSIS — R21 RASH: ICD-10-CM

## 2024-03-01 RX ORDER — DIAPER,BRIEF,INFANT-TODD,DISP
EACH MISCELLANEOUS DAILY PRN
Qty: 28.4 G | Refills: 0 | Status: SHIPPED | OUTPATIENT
Start: 2024-03-01

## 2024-03-01 NOTE — TELEPHONE ENCOUNTER
Please contact the patient when the cream is ready to be      The patient would like a referral for a allergist in [place]. She report she went one time to a allergist but she do not want to returned to that practice.

## 2024-03-05 ENCOUNTER — TELEPHONE (OUTPATIENT)
Age: 54
End: 2024-03-05

## 2024-03-05 NOTE — TELEPHONE ENCOUNTER
Patient called wanting to know if Dr Raphael could please place a referral for her for the ENT on [address]  [address]  [address]  [place] [address]    Please mail referral home to patient's home address and call patient back to give update.

## 2024-03-07 DIAGNOSIS — J30.2 SEASONAL ALLERGIES: Primary | ICD-10-CM

## 2024-03-21 ENCOUNTER — TELEPHONE (OUTPATIENT)
Dept: OTHER | Facility: OTHER | Age: 54
End: 2024-03-21

## 2024-03-22 NOTE — TELEPHONE ENCOUNTER
Patient is calling regarding cancelling an appointment.    Date/Time: 3/22/24 @ 10 am    Patient was rescheduled: YES [] NO [x]    Patient requesting call back to reschedule: YES [] NO [x] Patient has been admitted to the hospital

## 2024-04-10 ENCOUNTER — TELEPHONE (OUTPATIENT)
Age: 54
End: 2024-04-10

## 2024-04-10 NOTE — TELEPHONE ENCOUNTER
Patient scheduled for a colonoscopy consult on 6/18/24. Patient experiences rectal bleeding at times.

## 2024-04-10 NOTE — TELEPHONE ENCOUNTER
04/10/24  Screened by: Nannette Oglesby    Referring Provider     Pre- Screening:     There is no height or weight on file to calculate BMI. 5'2  165 lbs  BMI 30.2  Has patient been referred for a routine screening Colonoscopy? yes  Is the patient between 45-75 years old? yes      Previous Colonoscopy no   If yes:    Date:     Facility:     Reason:     Does the patient want to see a Gastroenterologist prior to their procedure OR are they having any GI symptoms? yes    Has the patient been hospitalized or had abdominal surgery in the past 6 months? no    Does the patient use supplemental oxygen? no    Does the patient take Coumadin, Lovenox, Plavix, Elliquis, Xarelto, or other blood thinning medication? no    Has the patient had a stroke, cardiac event, or stent placed in the past year? no    If patient is between 45yrs - 49yrs, please advise patient that we will have to confirm benefits & coverage with their insurance company for a routine screening colonoscopy.

## 2024-05-10 ENCOUNTER — APPOINTMENT (OUTPATIENT)
Dept: LAB | Facility: CLINIC | Age: 54
End: 2024-05-10
Payer: MEDICARE

## 2024-05-10 ENCOUNTER — TRANSCRIBE ORDERS (OUTPATIENT)
Dept: LAB | Facility: CLINIC | Age: 54
End: 2024-05-10

## 2024-05-10 DIAGNOSIS — Z79.899 ENCOUNTER FOR LONG TERM BENZODIAZEPINE THERAPY: ICD-10-CM

## 2024-05-10 DIAGNOSIS — N93.9 ABNORMAL UTERINE BLEEDING: Primary | ICD-10-CM

## 2024-05-10 DIAGNOSIS — Z79.899 ENCOUNTER FOR LONG TERM BENZODIAZEPINE THERAPY: Primary | ICD-10-CM

## 2024-05-10 DIAGNOSIS — N92.6 IRREGULAR MENSTRUAL BLEEDING: ICD-10-CM

## 2024-05-10 DIAGNOSIS — R74.8 ABNORMAL LIVER ENZYMES: ICD-10-CM

## 2024-05-10 LAB
BASOPHILS # BLD AUTO: 0.04 THOUSANDS/ÂΜL (ref 0–0.1)
BASOPHILS NFR BLD AUTO: 1 % (ref 0–1)
EOSINOPHIL # BLD AUTO: 0.16 THOUSAND/ÂΜL (ref 0–0.61)
EOSINOPHIL NFR BLD AUTO: 3 % (ref 0–6)
ERYTHROCYTE [DISTWIDTH] IN BLOOD BY AUTOMATED COUNT: 11.8 % (ref 11.6–15.1)
HCT VFR BLD AUTO: 41.9 % (ref 34.8–46.1)
HGB BLD-MCNC: 13.7 G/DL (ref 11.5–15.4)
IMM GRANULOCYTES # BLD AUTO: 0 THOUSAND/UL (ref 0–0.2)
IMM GRANULOCYTES NFR BLD AUTO: 0 % (ref 0–2)
LYMPHOCYTES # BLD AUTO: 2.32 THOUSANDS/ÂΜL (ref 0.6–4.47)
LYMPHOCYTES NFR BLD AUTO: 39 % (ref 14–44)
MCH RBC QN AUTO: 30.6 PG (ref 26.8–34.3)
MCHC RBC AUTO-ENTMCNC: 32.7 G/DL (ref 31.4–37.4)
MCV RBC AUTO: 94 FL (ref 82–98)
MONOCYTES # BLD AUTO: 0.54 THOUSAND/ÂΜL (ref 0.17–1.22)
MONOCYTES NFR BLD AUTO: 9 % (ref 4–12)
NEUTROPHILS # BLD AUTO: 2.95 THOUSANDS/ÂΜL (ref 1.85–7.62)
NEUTS SEG NFR BLD AUTO: 48 % (ref 43–75)
NRBC BLD AUTO-RTO: 0 /100 WBCS
PLATELET # BLD AUTO: 298 THOUSANDS/UL (ref 149–390)
PMV BLD AUTO: 9.4 FL (ref 8.9–12.7)
RBC # BLD AUTO: 4.47 MILLION/UL (ref 3.81–5.12)
TSH SERPL DL<=0.05 MIU/L-ACNC: 1.58 UIU/ML (ref 0.45–4.5)
WBC # BLD AUTO: 6.01 THOUSAND/UL (ref 4.31–10.16)

## 2024-05-10 PROCEDURE — 85025 COMPLETE CBC W/AUTO DIFF WBC: CPT

## 2024-05-10 PROCEDURE — 36415 COLL VENOUS BLD VENIPUNCTURE: CPT

## 2024-05-10 PROCEDURE — 84443 ASSAY THYROID STIM HORMONE: CPT

## 2024-05-23 DIAGNOSIS — E03.9 HYPOTHYROIDISM, UNSPECIFIED TYPE: ICD-10-CM

## 2024-05-24 RX ORDER — LEVOTHYROXINE SODIUM 0.07 MG/1
75 TABLET ORAL DAILY
Qty: 90 TABLET | Refills: 1 | Status: SHIPPED | OUTPATIENT
Start: 2024-05-24

## 2024-05-26 ENCOUNTER — HEALTH MAINTENANCE LETTER (OUTPATIENT)
Age: 54
End: 2024-05-26

## 2024-06-07 ENCOUNTER — APPOINTMENT (OUTPATIENT)
Dept: LAB | Facility: CLINIC | Age: 54
End: 2024-06-07
Payer: COMMERCIAL

## 2024-06-07 DIAGNOSIS — N92.6 IRREGULAR MENSTRUAL BLEEDING: ICD-10-CM

## 2024-06-07 DIAGNOSIS — N93.9 ABNORMAL UTERINE BLEEDING: ICD-10-CM

## 2024-06-07 LAB
ERYTHROCYTE [DISTWIDTH] IN BLOOD BY AUTOMATED COUNT: 11.7 % (ref 11.6–15.1)
HCT VFR BLD AUTO: 42.6 % (ref 34.8–46.1)
HGB BLD-MCNC: 13.4 G/DL (ref 11.5–15.4)
MCH RBC QN AUTO: 30 PG (ref 26.8–34.3)
MCHC RBC AUTO-ENTMCNC: 31.5 G/DL (ref 31.4–37.4)
MCV RBC AUTO: 96 FL (ref 82–98)
PLATELET # BLD AUTO: 303 THOUSANDS/UL (ref 149–390)
PMV BLD AUTO: 9.2 FL (ref 8.9–12.7)
RBC # BLD AUTO: 4.46 MILLION/UL (ref 3.81–5.12)
TSH SERPL DL<=0.05 MIU/L-ACNC: 1.17 UIU/ML (ref 0.45–4.5)
WBC # BLD AUTO: 5.65 THOUSAND/UL (ref 4.31–10.16)

## 2024-06-07 PROCEDURE — 84443 ASSAY THYROID STIM HORMONE: CPT

## 2024-06-07 PROCEDURE — 85027 COMPLETE CBC AUTOMATED: CPT

## 2024-06-07 PROCEDURE — 36415 COLL VENOUS BLD VENIPUNCTURE: CPT

## 2024-06-10 DIAGNOSIS — E03.9 HYPOTHYROIDISM, UNSPECIFIED TYPE: ICD-10-CM

## 2024-06-11 RX ORDER — FLUTICASONE PROPIONATE 50 MCG
SPRAY, SUSPENSION (ML) NASAL
Qty: 48 ML | Refills: 1 | Status: SHIPPED | OUTPATIENT
Start: 2024-06-11

## 2024-08-20 ENCOUNTER — TELEPHONE (OUTPATIENT)
Age: 54
End: 2024-08-20

## 2024-08-23 DIAGNOSIS — J30.1 NON-SEASONAL ALLERGIC RHINITIS DUE TO POLLEN: ICD-10-CM

## 2024-08-26 RX ORDER — MONTELUKAST SODIUM 10 MG/1
10 TABLET ORAL
Qty: 90 TABLET | Refills: 1 | Status: SHIPPED | OUTPATIENT
Start: 2024-08-26

## 2024-10-05 ENCOUNTER — IMMUNIZATIONS (OUTPATIENT)
Dept: INTERNAL MEDICINE CLINIC | Facility: CLINIC | Age: 54
End: 2024-10-05
Payer: COMMERCIAL

## 2024-10-05 DIAGNOSIS — Z23 ENCOUNTER FOR IMMUNIZATION: Primary | ICD-10-CM

## 2024-10-05 PROCEDURE — 90673 RIV3 VACCINE NO PRESERV IM: CPT

## 2024-10-05 PROCEDURE — 90471 IMMUNIZATION ADMIN: CPT

## 2024-11-21 DIAGNOSIS — E03.9 HYPOTHYROIDISM, UNSPECIFIED TYPE: ICD-10-CM

## 2024-11-21 RX ORDER — LEVOTHYROXINE SODIUM 75 UG/1
75 TABLET ORAL DAILY
Qty: 90 TABLET | Refills: 1 | Status: SHIPPED | OUTPATIENT
Start: 2024-11-21

## 2024-12-18 ENCOUNTER — TELEPHONE (OUTPATIENT)
Age: 54
End: 2024-12-18

## 2024-12-18 NOTE — TELEPHONE ENCOUNTER
The patient called for about two days she has had diarrhea   everything she eats goes right through  her stomach is also gurgling she has tried Gatorade chicken broth water nothing is helping   would Dr Raphael be able to give her something to stop the diarrhea?  Please advise thank you

## 2024-12-19 DIAGNOSIS — L30.9 ECZEMA, UNSPECIFIED TYPE: ICD-10-CM

## 2024-12-19 RX ORDER — CLOBETASOL PROPIONATE 0.5 MG/G
1 OINTMENT TOPICAL 2 TIMES DAILY
Qty: 30 G | Refills: 0 | Status: SHIPPED | OUTPATIENT
Start: 2024-12-19

## 2024-12-19 NOTE — TELEPHONE ENCOUNTER
Patient called because she would like a referral/recommendation  to see a new Therapist in the [place] area. She said she's been seeing the same therapist for 10+years and she feels like the nothing the doctor gives her is working for her anxiety, sleeping problems or bipolar issue and she has to keep changing medications. She said she does not want to go to the therapy offices on [address], [place] [address] or [address]. Please call patient back to further assist. Thank you.

## 2024-12-20 ENCOUNTER — TELEPHONE (OUTPATIENT)
Age: 54
End: 2024-12-20

## 2024-12-20 NOTE — TELEPHONE ENCOUNTER
Patient called and stated she went to the lab to have the AST lab drawn but the order is .  She would like to know if the date on the order can be updated so she can go to have the lab drawn either today or tomorrow.  Please advise.  Thank you!

## 2024-12-22 DIAGNOSIS — R89.9 ABNORMAL LABORATORY TEST: Primary | ICD-10-CM

## 2024-12-24 ENCOUNTER — RA CDI HCC (OUTPATIENT)
Dept: OTHER | Facility: HOSPITAL | Age: 54
End: 2024-12-24

## 2024-12-24 ENCOUNTER — RESULTS FOLLOW-UP (OUTPATIENT)
Dept: INTERNAL MEDICINE CLINIC | Facility: CLINIC | Age: 54
End: 2024-12-24

## 2024-12-24 ENCOUNTER — APPOINTMENT (OUTPATIENT)
Dept: LAB | Facility: CLINIC | Age: 54
End: 2024-12-24
Payer: COMMERCIAL

## 2024-12-24 DIAGNOSIS — R89.9 ABNORMAL LABORATORY TEST: ICD-10-CM

## 2024-12-24 PROBLEM — Z12.31 ENCOUNTER FOR SCREENING MAMMOGRAM FOR BREAST CANCER: Status: RESOLVED | Noted: 2019-06-24 | Resolved: 2024-12-24

## 2024-12-24 PROBLEM — Z13.220 SCREENING CHOLESTEROL LEVEL: Status: RESOLVED | Noted: 2019-06-24 | Resolved: 2024-12-24

## 2024-12-24 LAB — AST SERPL W P-5'-P-CCNC: 32 U/L (ref 13–39)

## 2024-12-24 PROCEDURE — 36415 COLL VENOUS BLD VENIPUNCTURE: CPT

## 2024-12-24 PROCEDURE — 84450 TRANSFERASE (AST) (SGOT): CPT

## 2024-12-31 ENCOUNTER — OFFICE VISIT (OUTPATIENT)
Dept: INTERNAL MEDICINE CLINIC | Facility: CLINIC | Age: 54
End: 2024-12-31
Payer: COMMERCIAL

## 2024-12-31 VITALS
HEART RATE: 81 BPM | HEIGHT: 62 IN | TEMPERATURE: 98 F | BODY MASS INDEX: 26.31 KG/M2 | DIASTOLIC BLOOD PRESSURE: 90 MMHG | WEIGHT: 143 LBS | SYSTOLIC BLOOD PRESSURE: 150 MMHG | OXYGEN SATURATION: 99 %

## 2024-12-31 DIAGNOSIS — R03.0 ELEVATED BLOOD PRESSURE READING: ICD-10-CM

## 2024-12-31 DIAGNOSIS — F51.01 PRIMARY INSOMNIA: ICD-10-CM

## 2024-12-31 DIAGNOSIS — F33.2 SEVERE EPISODE OF RECURRENT MAJOR DEPRESSIVE DISORDER, WITHOUT PSYCHOTIC FEATURES (HCC): ICD-10-CM

## 2024-12-31 DIAGNOSIS — L23.9 ALLERGIC DERMATITIS: ICD-10-CM

## 2024-12-31 DIAGNOSIS — N76.0 VAGINOSIS: ICD-10-CM

## 2024-12-31 DIAGNOSIS — Z00.00 MEDICARE ANNUAL WELLNESS VISIT, SUBSEQUENT: Primary | ICD-10-CM

## 2024-12-31 DIAGNOSIS — R21 RASH: ICD-10-CM

## 2024-12-31 DIAGNOSIS — E78.5 HYPERLIPIDEMIA, UNSPECIFIED HYPERLIPIDEMIA TYPE: ICD-10-CM

## 2024-12-31 DIAGNOSIS — J30.1 NON-SEASONAL ALLERGIC RHINITIS DUE TO POLLEN: ICD-10-CM

## 2024-12-31 DIAGNOSIS — F10.21 ALCOHOL DEPENDENCE IN REMISSION (HCC): ICD-10-CM

## 2024-12-31 DIAGNOSIS — E03.9 HYPOTHYROIDISM, UNSPECIFIED TYPE: ICD-10-CM

## 2024-12-31 DIAGNOSIS — F33.9 MAJOR DEPRESSION, RECURRENT, CHRONIC (HCC): ICD-10-CM

## 2024-12-31 DIAGNOSIS — H10.13 ALLERGIC CONJUNCTIVITIS OF BOTH EYES: ICD-10-CM

## 2024-12-31 DIAGNOSIS — Z12.31 ENCOUNTER FOR SCREENING MAMMOGRAM FOR MALIGNANT NEOPLASM OF BREAST: ICD-10-CM

## 2024-12-31 DIAGNOSIS — N95.1 PERIMENOPAUSAL: ICD-10-CM

## 2024-12-31 DIAGNOSIS — B37.31 YEAST VAGINITIS: ICD-10-CM

## 2024-12-31 DIAGNOSIS — Z23 ENCOUNTER FOR IMMUNIZATION: ICD-10-CM

## 2024-12-31 PROCEDURE — 90471 IMMUNIZATION ADMIN: CPT

## 2024-12-31 PROCEDURE — 87660 TRICHOMONAS VAGIN DIR PROBE: CPT | Performed by: GENERAL ACUTE CARE HOSPITAL

## 2024-12-31 PROCEDURE — G0439 PPPS, SUBSEQ VISIT: HCPCS | Performed by: GENERAL ACUTE CARE HOSPITAL

## 2024-12-31 PROCEDURE — 87480 CANDIDA DNA DIR PROBE: CPT | Performed by: GENERAL ACUTE CARE HOSPITAL

## 2024-12-31 PROCEDURE — 87510 GARDNER VAG DNA DIR PROBE: CPT | Performed by: GENERAL ACUTE CARE HOSPITAL

## 2024-12-31 PROCEDURE — 99214 OFFICE O/P EST MOD 30 MIN: CPT | Performed by: GENERAL ACUTE CARE HOSPITAL

## 2024-12-31 PROCEDURE — 90677 PCV20 VACCINE IM: CPT

## 2024-12-31 RX ORDER — ADHESIVE BANDAGE 3/4"
BANDAGE TOPICAL DAILY
Qty: 1 EACH | Refills: 0 | Status: SHIPPED | OUTPATIENT
Start: 2024-12-31 | End: 2025-01-02

## 2024-12-31 RX ORDER — DIAPER,BRIEF,INFANT-TODD,DISP
EACH MISCELLANEOUS DAILY PRN
Qty: 28.4 G | Refills: 0 | Status: SHIPPED | OUTPATIENT
Start: 2024-12-31

## 2024-12-31 RX ORDER — LEVOTHYROXINE SODIUM 75 UG/1
75 TABLET ORAL DAILY
Qty: 90 TABLET | Refills: 1 | Status: SHIPPED | OUTPATIENT
Start: 2024-12-31

## 2024-12-31 RX ORDER — FLUCONAZOLE 150 MG/1
150 TABLET ORAL ONCE
Qty: 1 TABLET | Refills: 0 | Status: SHIPPED | OUTPATIENT
Start: 2024-12-31 | End: 2024-12-31

## 2024-12-31 RX ORDER — OLOPATADINE HYDROCHLORIDE 1 MG/ML
1 SOLUTION/ DROPS OPHTHALMIC 2 TIMES DAILY
Qty: 5 ML | Refills: 1 | Status: SHIPPED | OUTPATIENT
Start: 2024-12-31

## 2024-12-31 RX ORDER — MONTELUKAST SODIUM 10 MG/1
10 TABLET ORAL
Qty: 90 TABLET | Refills: 1 | Status: SHIPPED | OUTPATIENT
Start: 2024-12-31

## 2024-12-31 RX ORDER — TRIAMCINOLONE ACETONIDE 1 MG/G
1 CREAM TOPICAL 2 TIMES DAILY
Qty: 60 G | Refills: 1 | Status: SHIPPED | OUTPATIENT
Start: 2024-12-31

## 2024-12-31 NOTE — ASSESSMENT & PLAN NOTE
Med refilled.   F/u with pcp   Orders:    triamcinolone (KENALOG) 0.1 % cream; Apply 1 Application topically 2 (two) times a day To affected area

## 2024-12-31 NOTE — ASSESSMENT & PLAN NOTE
F/u psych  Depression Screening Follow-up Plan: Patient's depression screening was positive with a PHQ-9 score of 10.

## 2024-12-31 NOTE — PATIENT INSTRUCTIONS
Please try some over the counter remedies for menopause symptoms, eg. Healthy Woman Soy, Remifemin, I cool.         Medicare Preventive Visit Patient Instructions  Thank you for completing your Welcome to Medicare Visit or Medicare Annual Wellness Visit today. Your next wellness visit will be due in one year (1/1/2026).  The screening/preventive services that you may require over the next 5-10 years are detailed below. Some tests may not apply to you based off risk factors and/or age. Screening tests ordered at today's visit but not completed yet may show as past due. Also, please note that scanned in results may not display below.  Preventive Screenings:  Service Recommendations Previous Testing/Comments   Colorectal Cancer Screening  * Colonoscopy    * Fecal Occult Blood Test (FOBT)/Fecal Immunochemical Test (FIT)  * Fecal DNA/Cologuard Test  * Flexible Sigmoidoscopy Age: 45-75 years old   Colonoscopy: every 10 years (may be performed more frequently if at higher risk)  OR  FOBT/FIT: every 1 year  OR  Cologuard: every 3 years  OR  Sigmoidoscopy: every 5 years  Screening may be recommended earlier than age 45 if at higher risk for colorectal cancer. Also, an individualized decision between you and your healthcare provider will decide whether screening between the ages of 76-85 would be appropriate. Colonoscopy: Not on file  FOBT/FIT: Not on file  Cologuard: Not on file  Sigmoidoscopy: Not on file          Breast Cancer Screening Age: 40+ years old  Frequency: every 1-2 years  Not required if history of left and right mastectomy Mammogram: 09/07/2023    Screening Current   Cervical Cancer Screening Between the ages of 21-29, pap smear recommended once every 3 years.   Between the ages of 30-65, can perform pap smear with HPV co-testing every 5 years.   Recommendations may differ for women with a history of total hysterectomy, cervical cancer, or abnormal pap smears in past. Pap Smear: 11/24/2020        Hepatitis C  Screening Once for adults born between 1945 and 1965  More frequently in patients at high risk for Hepatitis C Hep C Antibody: 06/25/2021    Screening Not Indicated  History Hepatitis C   Diabetes Screening 1-2 times per year if you're at risk for diabetes or have pre-diabetes Fasting glucose: 87 mg/dL (8/29/2023)  A1C: 5.3 % (8/29/2023)      Cholesterol Screening Once every 5 years if you don't have a lipid disorder. May order more often based on risk factors. Lipid panel: 08/29/2023    Screening Current     Other Preventive Screenings Covered by Medicare:  Abdominal Aortic Aneurysm (AAA) Screening: covered once if your at risk. You're considered to be at risk if you have a family history of AAA.  Lung Cancer Screening: covers low dose CT scan once per year if you meet all of the following conditions: (1) Age 55-77; (2) No signs or symptoms of lung cancer; (3) Current smoker or have quit smoking within the last 15 years; (4) You have a tobacco smoking history of at least 20 pack years (packs per day multiplied by number of years you smoked); (5) You get a written order from a healthcare provider.  Glaucoma Screening: covered annually if you're considered high risk: (1) You have diabetes OR (2) Family history of glaucoma OR (3)  aged 50 and older OR (4)  American aged 65 and older  Osteoporosis Screening: covered every 2 years if you meet one of the following conditions: (1) You're estrogen deficient and at risk for osteoporosis based off medical history and other findings; (2) Have a vertebral abnormality; (3) On glucocorticoid therapy for more than 3 months; (4) Have primary hyperparathyroidism; (5) On osteoporosis medications and need to assess response to drug therapy.   Last bone density test (DXA Scan): Not on file.  HIV Screening: covered annually if you're between the age of 15-65. Also covered annually if you are younger than 15 and older than 65 with risk factors for HIV infection.  For pregnant patients, it is covered up to 3 times per pregnancy.    Immunizations:  Immunization Recommendations   Influenza Vaccine Annual influenza vaccination during flu season is recommended for all persons aged >= 6 months who do not have contraindications   Pneumococcal Vaccine   * Pneumococcal conjugate vaccine = PCV13 (Prevnar 13), PCV15 (Vaxneuvance), PCV20 (Prevnar 20)  * Pneumococcal polysaccharide vaccine = PPSV23 (Pneumovax) Adults 19-63 yo with certain risk factors or if 65+ yo  If never received any pneumonia vaccine: recommend Prevnar 20 (PCV20)  Give PCV20 if previously received 1 dose of PCV13 or PPSV23   Hepatitis B Vaccine 3 dose series if at intermediate or high risk (ex: diabetes, end stage renal disease, liver disease)   Respiratory syncytial virus (RSV) Vaccine - COVERED BY MEDICARE PART D  * RSVPreF3 (Arexvy) CDC recommends that adults 60 years of age and older may receive a single dose of RSV vaccine using shared clinical decision-making (SCDM)   Tetanus (Td) Vaccine - COST NOT COVERED BY MEDICARE PART B Following completion of primary series, a booster dose should be given every 10 years to maintain immunity against tetanus. Td may also be given as tetanus wound prophylaxis.   Tdap Vaccine - COST NOT COVERED BY MEDICARE PART B Recommended at least once for all adults. For pregnant patients, recommended with each pregnancy.   Shingles Vaccine (Shingrix) - COST NOT COVERED BY MEDICARE PART B  2 shot series recommended in those 19 years and older who have or will have weakened immune systems or those 50 years and older     Health Maintenance Due:      Topic Date Due    Colorectal Cancer Screening  Never done    Breast Cancer Screening: Mammogram  09/07/2024    Cervical Cancer Screening  11/24/2025    HIV Screening  Completed    Hepatitis C Screening  Discontinued     Immunizations Due:      Topic Date Due    COVID-19 Vaccine (5 - 2024-25 season) 09/01/2024     Advance Directives   What are  advance directives?  Advance directives are legal documents that state your wishes and plans for medical care. These plans are made ahead of time in case you lose your ability to make decisions for yourself. Advance directives can apply to any medical decision, such as the treatments you want, and if you want to donate organs.   What are the types of advance directives?  There are many types of advance directives, and each state has rules about how to use them. You may choose a combination of any of the following:  Living will:  This is a written record of the treatment you want. You can also choose which treatments you do not want, which to limit, and which to stop at a certain time. This includes surgery, medicine, IV fluid, and tube feedings.   Durable power of  for healthcare (DPAHC):  This is a written record that states who you want to make healthcare choices for you when you are unable to make them for yourself. This person, called a proxy, is usually a family member or a friend. You may choose more than 1 proxy.  Do not resuscitate (DNR) order:  A DNR order is used in case your heart stops beating or you stop breathing. It is a request not to have certain forms of treatment, such as CPR. A DNR order may be included in other types of advance directives.  Medical directive:  This covers the care that you want if you are in a coma, near death, or unable to make decisions for yourself. You can list the treatments you want for each condition. Treatment may include pain medicine, surgery, blood transfusions, dialysis, IV or tube feedings, and a ventilator (breathing machine).  Values history:  This document has questions about your views, beliefs, and how you feel and think about life. This information can help others choose the care that you would choose.  Why are advance directives important?  An advance directive helps you control your care. Although spoken wishes may be used, it is better to have your  wishes written down. Spoken wishes can be misunderstood, or not followed. Treatments may be given even if you do not want them. An advance directive may make it easier for your family to make difficult choices about your care.   Weight Management   Why it is important to manage your weight:  Being overweight increases your risk of health conditions such as heart disease, high blood pressure, type 2 diabetes, and certain types of cancer. It can also increase your risk for osteoarthritis, sleep apnea, and other respiratory problems. Aim for a slow, steady weight loss. Even a small amount of weight loss can lower your risk of health problems.  How to lose weight safely:  A safe and healthy way to lose weight is to eat fewer calories and get regular exercise. You can lose up about 1 pound a week by decreasing the number of calories you eat by 500 calories each day.   Healthy meal plan for weight management:  A healthy meal plan includes a variety of foods, contains fewer calories, and helps you stay healthy. A healthy meal plan includes the following:  Eat whole-grain foods more often.  A healthy meal plan should contain fiber. Fiber is the part of grains, fruits, and vegetables that is not broken down by your body. Whole-grain foods are healthy and provide extra fiber in your diet. Some examples of whole-grain foods are whole-wheat breads and pastas, oatmeal, brown rice, and bulgur.  Eat a variety of vegetables every day.  Include dark, leafy greens such as spinach, kale, cintia greens, and mustard greens. Eat yellow and orange vegetables such as carrots, sweet potatoes, and winter squash.   Eat a variety of fruits every day.  Choose fresh or canned fruit (canned in its own juice or light syrup) instead of juice. Fruit juice has very little or no fiber.  Eat low-fat dairy foods.  Drink fat-free (skim) milk or 1% milk. Eat fat-free yogurt and low-fat cottage cheese. Try low-fat cheeses such as mozzarella and other  reduced-fat cheeses.  Choose meat and other protein foods that are low in fat.  Choose beans or other legumes such as split peas or lentils. Choose fish, skinless poultry (chicken or turkey), or lean cuts of red meat (beef or pork). Before you cook meat or poultry, cut off any visible fat.   Use less fat and oil.  Try baking foods instead of frying them. Add less fat, such as margarine, sour cream, regular salad dressing and mayonnaise to foods. Eat fewer high-fat foods. Some examples of high-fat foods include french fries, doughnuts, ice cream, and cakes.  Eat fewer sweets.  Limit foods and drinks that are high in sugar. This includes candy, cookies, regular soda, and sweetened drinks.  Exercise:  Exercise at least 30 minutes per day on most days of the week. Some examples of exercise include walking, biking, dancing, and swimming. You can also fit in more physical activity by taking the stairs instead of the elevator or parking farther away from stores. Ask your healthcare provider about the best exercise plan for you.      © Copyright [employer] 2018 Information is for End User's use only and may not be sold, redistributed or otherwise used for commercial purposes. All illustrations and images included in CareNotes® are the copyrighted property of A.D.A[employer]M., Inc. or MPV

## 2024-12-31 NOTE — ASSESSMENT & PLAN NOTE
Vaccinations:rec PCV 20, got flu shot 2024  Mammogram:scheduled  Colon cancer screen:she received FIT from insurance, will get it done  Healthy diet and regular exercise

## 2024-12-31 NOTE — ASSESSMENT & PLAN NOTE
TSH level wnl  Continue  Recheck TSH ordered  Orders:    levothyroxine 75 mcg tablet; Take 1 tablet (75 mcg total) by mouth daily    TSH, 3rd generation with Free T4 reflex; Future    CBC and differential; Future

## 2024-12-31 NOTE — PROGRESS NOTES
Name: Suzi Dejesus      : 1970      MRN: [id number]  Encounter Provider: Yamel Parra MD  Encounter Date: 2024   Encounter department: [facility]    Assessment & Plan  Medicare annual wellness visit, subsequent  Vaccinations:rec PCV 20, got flu shot   Mammogram:scheduled  Colon cancer screen:she received FIT from insurance, will get it done  Healthy diet and regular exercise       Hypothyroidism, unspecified type  TSH level wnl  Continue  Recheck TSH ordered  Orders:    levothyroxine 75 mcg tablet; Take 1 tablet (75 mcg total) by mouth daily    TSH, 3rd generation with Free T4 reflex; Future    CBC and differential; Future    Allergic dermatitis  Med refilled.   F/u with pcp   Orders:    triamcinolone (KENALOG) 0.1 % cream; Apply 1 Application topically 2 (two) times a day To affected area    Non-seasonal allergic rhinitis due to pollen  Med refilled  F/u with pcp  Orders:    montelukast (SINGULAIR) 10 mg tablet; Take 1 tablet (10 mg total) by mouth daily at bedtime    Rash    Orders:    hydrocortisone 0.5 % cream; Apply topically daily as needed for rash    Yeast vaginitis  Vaginosis self swab     Orders:    fluconazole (DIFLUCAN) 150 mg tablet; Take 1 tablet (150 mg total) by mouth once for 1 dose    Severe episode of recurrent major depressive disorder, without psychotic features (HCC)  F/u psych  Depression Screening Follow-up Plan: Patient's depression screening was positive with a PHQ-9 score of 10.          Alcohol dependence in remission (HCC)    F/u with pcp       Major depression, recurrent, chronic (HCC)  Depression Screening Follow-up Plan: Patient's depression screening was positive with a PHQ-9 score of 10.          Allergic conjunctivitis of both eyes    Orders:    olopatadine (PATANOL) 0.1 % ophthalmic solution; Administer 1 drop to both eyes 2 (two) times a day    Primary insomnia  F/u with pcp       Encounter for immunization    Orders:    Pneumococcal  Conjugate Vaccine 20-valent (Pcv20)    Perimenopausal  Discussed to try otc product  F/u with psych about mood swing that could be related to perimenopausal         Hyperlipidemia, unspecified hyperlipidemia type    Orders:    Lipid Panel With Direct LDL; Future    Comprehensive metabolic panel; Future    CBC and differential; Future    Elevated blood pressure reading  Check home BP readings and bring to next visit to review  Orders:    Blood Pressure Monitoring (Blood Pressure Cuff) MISC; Use in the morning    Vaginosis    Orders:    Vaginosis DNA Probe; Future       Preventive health issues were discussed with patient, and age appropriate screening tests were ordered as noted in patient's After Visit Summary. Personalized health advice and appropriate referrals for health education or preventive services given if needed, as noted in patient's After Visit Summary.    History of Present Illness     HPI   It's my first encounter with this pt  Has not been seen for more than a year  F/u chronic issues, new issues, medicare wellness visit.     C/o allergy symptoms, itchy skin and ear  Trouble sleep  Possible perimenopausal symptoms  Needs refills   Vaginal burning/itchiness/white discharge      Patient Care Team:  Darrius Raphael DO as PCP - General Alison Avila DO    Review of Systems  Medical History Reviewed by provider this encounter:       Annual Wellness Visit Questionnaire   Suzi is here for her Subsequent Wellness visit.     Health Risk Assessment:   Patient rates overall health as good. Patient feels that their physical health rating is same. Patient is satisfied with their life. Eyesight was rated as same. Hearing was rated as same. Patient feels that their emotional and mental health rating is same. Patients states they are never, rarely angry. Patient states they are often unusually tired/fatigued. Pain experienced in the last 7 days has been none. Patient states that she has experienced no  weight loss or gain in last 6 months.     Depression Screening:   PHQ-9 Score: 10      Fall Risk Screening:   In the past year, patient has experienced: no history of falling in past year      Urinary Incontinence Screening:   Patient has not leaked urine accidently in the last six months.     Home Safety:  Patient does not have trouble with stairs inside or outside of their home. Patient has working smoke alarms and has working carbon monoxide detector. Home safety hazards include: none.     Nutrition:   Current diet is Regular.     Medications:   Patient is currently taking over-the-counter supplements. OTC medications include: see medication list. Patient is able to manage medications.     Activities of Daily Living (ADLs)/Instrumental Activities of Daily Living (IADLs):   Walk and transfer into and out of bed and chair?: Yes  Dress and groom yourself?: Yes    Bathe or shower yourself?: Yes    Feed yourself? Yes  Do your laundry/housekeeping?: Yes  Manage your money, pay your bills and track your expenses?: Yes  Make your own meals?: Yes    Do your own shopping?: Yes    Previous Hospitalizations:   Any hospitalizations or ED visits within the last 12 months?: No      Advance Care Planning:   Living will: No    Durable POA for healthcare: No    Advanced directive: No      PREVENTIVE SCREENINGS      Cardiovascular Screening:    General: Screening Current      Breast Cancer Screening:     General: Screening Current      Lung Cancer Screening:     General: Screening Not Indicated      Hepatitis C Screening:    General: Screening Not Indicated and History Hepatitis C    Screening, Brief Intervention, and Referral to Treatment (SBIRT)    Screening      AUDIT-C Screenin) How often did you have a drink containing alcohol in the past year? never  2) How many drinks did you have on a typical day when you were drinking in the past year? 0  3) How often did you have 6 or more drinks on one occasion in the past year?  "never    AUDIT-C Score: 0  Interpretation: Score 0-2 (female): Negative screen for alcohol misuse    Single Item Drug Screening:  How often have you used an illegal drug (including marijuana) or a prescription medication for non-medical reasons in the past year? never    Single Item Drug Screen Score: 0  Interpretation: Negative screen for possible drug use disorder    Social Drivers of Health     Financial Resource Strain: Low Risk  (8/25/2023)    Overall Financial Resource Strain (CARDIA)     Difficulty of Paying Living Expenses: Not hard at all   Food Insecurity: No Food Insecurity (12/31/2024)    Hunger Vital Sign     Worried About Running Out of Food in the Last Year: Never true     Ran Out of Food in the Last Year: Never true   Transportation Needs: No Transportation Needs (12/31/2024)    PRAPARE - Transportation     Lack of Transportation (Medical): No     Lack of Transportation (Non-Medical): No   Housing Stability: Low Risk  (12/31/2024)    Housing Stability Vital Sign     Unable to Pay for Housing in the Last Year: No     Number of Times Moved in the Last Year: 1     Homeless in the Last Year: No   Utilities: Not At Risk (12/31/2024)    [facility] Utilities     Threatened with loss of utilities: No     No results found.    Objective   /90 (BP Location: Left arm, Patient Position: Sitting, Cuff Size: Standard)   Pulse 81   Temp 98 °F (36.7 °C) (Temporal)   Ht 5' 2\" (1.575 m)   Wt 64.9 kg (143 lb)   SpO2 99%   BMI 26.16 kg/m²     Physical Exam    "

## 2024-12-31 NOTE — ASSESSMENT & PLAN NOTE
Depression Screening Follow-up Plan: Patient's depression screening was positive with a PHQ-9 score of 10.

## 2024-12-31 NOTE — ASSESSMENT & PLAN NOTE
Med refilled  F/u with pcp  Orders:    montelukast (SINGULAIR) 10 mg tablet; Take 1 tablet (10 mg total) by mouth daily at bedtime

## 2024-12-31 NOTE — ASSESSMENT & PLAN NOTE
Check home BP readings and bring to next visit to review  Orders:    Blood Pressure Monitoring (Blood Pressure Cuff) MISC; Use in the morning

## 2025-01-01 LAB
CANDIDA RRNA VAG QL PROBE: NOT DETECTED
G VAGINALIS RRNA GENITAL QL PROBE: NOT DETECTED
T VAGINALIS RRNA GENITAL QL PROBE: NOT DETECTED

## 2025-01-02 ENCOUNTER — TELEPHONE (OUTPATIENT)
Dept: INTERNAL MEDICINE CLINIC | Facility: CLINIC | Age: 55
End: 2025-01-02

## 2025-01-02 DIAGNOSIS — Z12.11 SCREENING FOR COLON CANCER: Primary | ICD-10-CM

## 2025-01-02 RX ORDER — ADHESIVE BANDAGE 3/4"
BANDAGE TOPICAL DAILY
Qty: 1 EACH | Refills: 0 | Status: SHIPPED | OUTPATIENT
Start: 2025-01-02

## 2025-01-02 NOTE — TELEPHONE ENCOUNTER
Can we please send BP machine to [facility] pharmacy in [place]. Pt states they need the script and for her to bring in her Insurance card to see if they approve it. Please advise.

## 2025-01-02 NOTE — TELEPHONE ENCOUNTER
Patient can't get BP machine filled [facility]. Reviewed to call around to see who will fill script and we will send it there.  If not she will need to go to a medical supply place instead.

## 2025-01-02 NOTE — TELEPHONE ENCOUNTER
Patient was seen but forgot to ask for GI referral for colonoscopy. Her referral exp from 8/2023 and never followed through with it then.

## 2025-01-06 ENCOUNTER — TELEPHONE (OUTPATIENT)
Age: 55
End: 2025-01-06

## 2025-01-09 ENCOUNTER — TELEPHONE (OUTPATIENT)
Age: 55
End: 2025-01-09

## 2025-01-09 DIAGNOSIS — R21 RASH: Primary | ICD-10-CM

## 2025-01-09 NOTE — TELEPHONE ENCOUNTER
Pt called in stating she needs her dermatology and allergist referrals reinstated. She's requesting they be mailed to her and would like a call back to confirm they're going in the mail.

## 2025-01-10 NOTE — TELEPHONE ENCOUNTER
Patient called to make sure the referrals were mailed out to her already. Please advise. Thank you.

## 2025-01-15 ENCOUNTER — PREP FOR PROCEDURE (OUTPATIENT)
Age: 55
End: 2025-01-15

## 2025-01-15 ENCOUNTER — TELEPHONE (OUTPATIENT)
Age: 55
End: 2025-01-15

## 2025-01-15 DIAGNOSIS — Z12.11 SCREENING FOR COLON CANCER: ICD-10-CM

## 2025-01-15 DIAGNOSIS — Z12.11 SCREENING FOR COLON CANCER: Primary | ICD-10-CM

## 2025-01-15 RX ORDER — SOD SULF/POT CHLORIDE/MAG SULF 1.479 G
TABLET ORAL
Qty: 24 TABLET | Refills: 0 | OUTPATIENT
Start: 2025-01-15

## 2025-01-15 NOTE — TELEPHONE ENCOUNTER
Patient called in stating she has not received the referrals in the mail yet. Advised they do take longer to receive when mailed out. Provided patient with address, phone numbers and doctors names she was being referred to for allergy and dermatology to schedule an appointment. Also warm transferred patient to GI POD for further assistance in scheduling an appointment from GI referral. No further assistance needed at this time.

## 2025-01-15 NOTE — TELEPHONE ENCOUNTER
01/15/25  Screened by: Candy Lopez MA    Referring Provider Dr. Darrius Raphael    Pre- Screening:     There is no height or weight on file to calculate BMI. 26.16  Has patient been referred for a routine screening Colonoscopy? yes  Is the patient between 45-75 years old? yes      Previous Colonoscopy no   If yes:    Date:     Facility:     Reason:       SCHEDULING STAFF: If the patient is between 45yrs-49yrs, please advise patient to confirm benefits/coverage with their insurance company for a routine screening colonoscopy, some insurance carriers will only cover at 50yrs or older. If the patient is over 75years old, please schedule an office visit.     Does the patient want to see a Gastroenterologist prior to their procedure OR are they having any GI symptoms? no    Has the patient been hospitalized or had abdominal surgery in the past 6 months? no    Does the patient use supplemental oxygen? no    Does the patient take Coumadin, Lovenox, Plavix, Elliquis, Xarelto, or other blood thinning medication? no    Has the patient had a stroke, cardiac event, or stent placed in the past year? no    SCHEDULING STAFF: If patient answers NO to above questions, then schedule procedure. If patient answers YES to above questions, then schedule office appointment.     If patient is between 45yrs - 49yrs, please advise patient that we will have to confirm benefits & coverage with their insurance company for a routine screening colonoscopy.

## 2025-01-15 NOTE — TELEPHONE ENCOUNTER
Please call and inform patient prescription sent to pharmacy for Sutab  not sure if her insurance will cover Prescription she can try using the Good Rx card to bring the cost down.

## 2025-01-15 NOTE — TELEPHONE ENCOUNTER
Scheduled date of colonoscopy (as of today): 3-  Physician performing colonoscopy: Dr. Gale   Location of colon[facility]: [facility]   Bowel prep reviewed with patient: PT stated she does not want the miralax requesting for a pill form called into her pharmacy and would like prep instruction sent by mail. Address verified. Thank you  Instructions reviewed with patient by: would like pill form and would like prep instruction sent by mail.c   Clearances: n/a

## 2025-01-15 NOTE — TELEPHONE ENCOUNTER
Spoke to patient regarding the sutab and the how much it will cost. If it is too much she will call back regarding a different prep. I mailed the sutab prep to patient. Thank you.

## 2025-01-30 PROBLEM — Z00.00 MEDICARE ANNUAL WELLNESS VISIT, SUBSEQUENT: Status: RESOLVED | Noted: 2023-08-27 | Resolved: 2025-01-30

## 2025-02-10 DIAGNOSIS — Z12.11 SCREENING FOR COLON CANCER: Primary | ICD-10-CM

## 2025-02-10 RX ORDER — SODIUM PICOSULFATE, MAGNESIUM OXIDE, AND ANHYDROUS CITRIC ACID 12; 3.5; 1 G/175ML; G/175ML; MG/175ML
LIQUID ORAL
Qty: 350 ML | Refills: 0 | Status: SHIPPED | OUTPATIENT
Start: 2025-02-10

## 2025-02-12 ENCOUNTER — TELEPHONE (OUTPATIENT)
Age: 55
End: 2025-02-12

## 2025-02-12 NOTE — TELEPHONE ENCOUNTER
Patient called regarding the medications   Sodium Sulfate-Mag Sulfate-KCl 0627-690-165 MG TABS and  sodium picosulfate, magnesium oxide, citric acid (Clenpiq) oral solution.  Suzi was expressing that they are too expensive, around $180. She cannot afford that and would like to know if there are any cheaper alternatives that PCP can recommend.    Please advise and contact patient   Thank you

## 2025-02-13 NOTE — TELEPHONE ENCOUNTER
Pt called in to follow up on the status of this message. Pt is requesting a call back as soon as possible.    Please advise.

## 2025-02-14 NOTE — TELEPHONE ENCOUNTER
Called and spoke to pt whom requested that I call her back in a half hour as she is not home right now to discuss. Will try to do so per pt's request.

## 2025-02-14 NOTE — TELEPHONE ENCOUNTER
I lmom informing pt that I would email the instructions for the Miralax w/ dulcolax preparation and to please call us back if has any questions or does not receive.

## 2025-02-14 NOTE — TELEPHONE ENCOUNTER
Please call patient and inform her she can do either Miralax and Ducolax bowel prep and provider her with instructions.

## 2025-02-26 DIAGNOSIS — R21 RASH: ICD-10-CM

## 2025-02-26 DIAGNOSIS — L30.9 ECZEMA, UNSPECIFIED TYPE: ICD-10-CM

## 2025-02-27 RX ORDER — DIAPER,BRIEF,INFANT-TODD,DISP
EACH MISCELLANEOUS DAILY PRN
Qty: 28.4 G | Refills: 0 | Status: SHIPPED | OUTPATIENT
Start: 2025-02-27

## 2025-02-27 RX ORDER — CLOBETASOL PROPIONATE 0.5 MG/G
1 OINTMENT TOPICAL 2 TIMES DAILY
Qty: 30 G | Refills: 0 | Status: SHIPPED | OUTPATIENT
Start: 2025-02-27

## 2025-03-03 ENCOUNTER — TELEPHONE (OUTPATIENT)
Age: 55
End: 2025-03-03

## 2025-03-03 NOTE — TELEPHONE ENCOUNTER
Pt called to reschedule appt 03/06/25.  Attempted to reschedule, unable to find another appt with provider.  Please call back back today to reschedule, thank you.

## 2025-03-11 ENCOUNTER — ANESTHESIA (OUTPATIENT)
Dept: ANESTHESIOLOGY | Facility: HOSPITAL | Age: 55
End: 2025-03-11

## 2025-03-11 ENCOUNTER — ANESTHESIA EVENT (OUTPATIENT)
Dept: ANESTHESIOLOGY | Facility: HOSPITAL | Age: 55
End: 2025-03-11

## 2025-03-14 DIAGNOSIS — E03.9 HYPOTHYROIDISM, UNSPECIFIED TYPE: ICD-10-CM

## 2025-03-14 RX ORDER — FLUTICASONE PROPIONATE 50 MCG
SPRAY, SUSPENSION (ML) NASAL
Qty: 48 ML | Refills: 1 | Status: SHIPPED | OUTPATIENT
Start: 2025-03-14

## 2025-03-18 ENCOUNTER — TELEPHONE (OUTPATIENT)
Dept: GASTROENTEROLOGY | Facility: AMBULARY SURGERY CENTER | Age: 55
End: 2025-03-18

## 2025-03-24 ENCOUNTER — TELEPHONE (OUTPATIENT)
Age: 55
End: 2025-03-24

## 2025-03-24 NOTE — TELEPHONE ENCOUNTER
Scheduled date of colonoscopy (as of today): 05/15/25  Physician performing colonoscopy: Dr. Gale  Location of colonoscopy: [facility]   Bowel prep reviewed with patient: Fermin/Oj  Instructions reviewed with patient by: Hallie  Clearances: n/a

## 2025-03-24 NOTE — TELEPHONE ENCOUNTER
Patients GI provider:  Dr. Simpson    Number to return call: [phone]    Reason for call: Pt called to reschedule colonoscopy due to feeling sick. Pt advised that she is not taking the clenpiq she wants to do the BARON/LILIAM prep. Please confirm if this is okay w/the provider and if so please mail out prep instructions to home address on file, pt did not want them to be emailed or sent via [employer].     Scheduled procedure/appointment date if applicable: Procedure: 05/15

## 2025-03-24 NOTE — TELEPHONE ENCOUNTER
Scheduled date of colonoscopy (as of today):05/15/25  Physician performing colonoscopy: Dr. Gale  Location of colonoscopy: [facility]  Bowel prep reviewed with patient: Fermin/Oj  Instructions reviewed with patient by:  Via mail  Clearances: n/a

## 2025-03-24 NOTE — TELEPHONE ENCOUNTER
Patients GI provider:  Dr. Gale    Number to return call: [phone]    Reason for call: Pt called to reschedule colonoscopy due to not feeling well. Pt wante to advise that she is not taking clenpiq and wants to do BARON/LILIAM prep. Can we check with the provider if this is okay, and send prep instructions to home address listed on file. Pt did not want prep instructions email or sent via [employer].     Scheduled procedure/appointment date if applicable: Procedure: 05/15/25

## 2025-03-25 ENCOUNTER — TELEPHONE (OUTPATIENT)
Dept: OTHER | Facility: OTHER | Age: 55
End: 2025-03-25

## 2025-03-25 NOTE — TELEPHONE ENCOUNTER
Patient is calling regarding cancelling an appointment.    Date/Time: 3/25/2025, 10 AM    Patient was rescheduled: YES [x] NO []      Rescheduled appointment on 4/7/2025 at 8:30 AM with Dr. Reyes.

## 2025-03-26 NOTE — TELEPHONE ENCOUNTER
This was already addressed yesterday in a different thread and the mrialax/dulcolax prep instructions was sent to her

## 2025-03-27 DIAGNOSIS — L30.9 ECZEMA, UNSPECIFIED TYPE: ICD-10-CM

## 2025-03-27 DIAGNOSIS — H10.13 ALLERGIC CONJUNCTIVITIS OF BOTH EYES: ICD-10-CM

## 2025-03-27 DIAGNOSIS — L23.9 ALLERGIC DERMATITIS: ICD-10-CM

## 2025-03-27 RX ORDER — TRIAMCINOLONE ACETONIDE 1 MG/G
1 CREAM TOPICAL 2 TIMES DAILY
Qty: 60 G | Refills: 1 | Status: SHIPPED | OUTPATIENT
Start: 2025-03-27

## 2025-03-27 RX ORDER — CLOBETASOL PROPIONATE 0.5 MG/G
1 OINTMENT TOPICAL 2 TIMES DAILY
Qty: 30 G | Refills: 0 | Status: SHIPPED | OUTPATIENT
Start: 2025-03-27

## 2025-03-27 RX ORDER — OLOPATADINE HYDROCHLORIDE 1 MG/ML
SOLUTION/ DROPS OPHTHALMIC
Qty: 5 ML | Refills: 1 | Status: SHIPPED | OUTPATIENT
Start: 2025-03-27

## 2025-04-07 ENCOUNTER — TELEPHONE (OUTPATIENT)
Dept: OTHER | Facility: OTHER | Age: 55
End: 2025-04-07

## 2025-04-07 NOTE — TELEPHONE ENCOUNTER
Patient is calling regarding cancelling an appointment.    Date/Time: 4/7 8:20    Patient was rescheduled: YES [x] NO []    Patient requesting call back to reschedule: YES [] NO [x]

## 2025-05-01 ENCOUNTER — ANESTHESIA EVENT (OUTPATIENT)
Dept: ANESTHESIOLOGY | Facility: HOSPITAL | Age: 55
End: 2025-05-01

## 2025-05-01 ENCOUNTER — ANESTHESIA (OUTPATIENT)
Dept: ANESTHESIOLOGY | Facility: HOSPITAL | Age: 55
End: 2025-05-01

## 2025-05-05 ENCOUNTER — TELEPHONE (OUTPATIENT)
Age: 55
End: 2025-05-05

## 2025-05-05 DIAGNOSIS — H10.13 ALLERGIC CONJUNCTIVITIS OF BOTH EYES: ICD-10-CM

## 2025-05-05 NOTE — TELEPHONE ENCOUNTER
Patient also called back and advised she has depression, bi-polar and anxiety and if that could be added to the letter for housing. If you have any questions please call her back.

## 2025-05-05 NOTE — TELEPHONE ENCOUNTER
"Pt called in stating she lives in a 1 bedroom apt and her management office says she will need a letter from her doctor in order to be considered for a 2 bedroom for extra space to exercise since she doesn't go outside often due to \"drama\" in her area. She's requesting a call back when the letter is ready.  "

## 2025-05-06 RX ORDER — OLOPATADINE HYDROCHLORIDE 1 MG/ML
SOLUTION/ DROPS OPHTHALMIC
Qty: 5 ML | Refills: 1 | Status: SHIPPED | OUTPATIENT
Start: 2025-05-06 | End: 2025-05-08 | Stop reason: SDUPTHER

## 2025-05-06 NOTE — TELEPHONE ENCOUNTER
Patient called this a.m. to see if letter is ready.  Please call her when letter is ready.  Thank you.

## 2025-05-07 NOTE — TELEPHONE ENCOUNTER
Suzi called to verify if the letter is available to .     We inform her Dr. Raphael will be in today at 10:00 am possible he will be working on the letter     Please contact

## 2025-05-07 NOTE — TELEPHONE ENCOUNTER
We will need to schedule patient tomorrow with the medical resident to myself we have not seen her since 8/23 we will need updated her condition to write this letter for her, overdue for an appointment

## 2025-05-08 ENCOUNTER — TELEPHONE (OUTPATIENT)
Dept: GASTROENTEROLOGY | Facility: AMBULARY SURGERY CENTER | Age: 55
End: 2025-05-08

## 2025-05-08 ENCOUNTER — OFFICE VISIT (OUTPATIENT)
Dept: INTERNAL MEDICINE CLINIC | Facility: CLINIC | Age: 55
End: 2025-05-08
Payer: MEDICARE

## 2025-05-08 VITALS
SYSTOLIC BLOOD PRESSURE: 162 MMHG | HEIGHT: 62 IN | HEART RATE: 87 BPM | BODY MASS INDEX: 25.98 KG/M2 | WEIGHT: 141.2 LBS | DIASTOLIC BLOOD PRESSURE: 88 MMHG | OXYGEN SATURATION: 94 %

## 2025-05-08 DIAGNOSIS — R21 RASH: ICD-10-CM

## 2025-05-08 DIAGNOSIS — E03.9 HYPOTHYROIDISM, UNSPECIFIED TYPE: Primary | ICD-10-CM

## 2025-05-08 DIAGNOSIS — L23.9 ALLERGIC DERMATITIS: ICD-10-CM

## 2025-05-08 DIAGNOSIS — F32.A DEPRESSION, UNSPECIFIED DEPRESSION TYPE: ICD-10-CM

## 2025-05-08 DIAGNOSIS — F10.21 ALCOHOL DEPENDENCE IN REMISSION (HCC): ICD-10-CM

## 2025-05-08 DIAGNOSIS — F33.2 SEVERE EPISODE OF RECURRENT MAJOR DEPRESSIVE DISORDER, WITHOUT PSYCHOTIC FEATURES (HCC): ICD-10-CM

## 2025-05-08 DIAGNOSIS — J30.1 NON-SEASONAL ALLERGIC RHINITIS DUE TO POLLEN: ICD-10-CM

## 2025-05-08 DIAGNOSIS — H10.13 ALLERGIC CONJUNCTIVITIS OF BOTH EYES: ICD-10-CM

## 2025-05-08 PROCEDURE — 99213 OFFICE O/P EST LOW 20 MIN: CPT

## 2025-05-08 PROCEDURE — G2211 COMPLEX E/M VISIT ADD ON: HCPCS

## 2025-05-08 RX ORDER — LEVOTHYROXINE SODIUM 75 UG/1
75 TABLET ORAL DAILY
Qty: 90 TABLET | Refills: 1 | Status: SHIPPED | OUTPATIENT
Start: 2025-05-08

## 2025-05-08 RX ORDER — TRIAMCINOLONE ACETONIDE 1 MG/G
1 CREAM TOPICAL 2 TIMES DAILY
Qty: 60 G | Refills: 1 | Status: SHIPPED | OUTPATIENT
Start: 2025-05-08

## 2025-05-08 RX ORDER — OLOPATADINE HYDROCHLORIDE 1 MG/ML
1 SOLUTION/ DROPS OPHTHALMIC 3 TIMES DAILY
Qty: 5 ML | Refills: 1 | Status: SHIPPED | OUTPATIENT
Start: 2025-05-08

## 2025-05-08 RX ORDER — DIAPER,BRIEF,INFANT-TODD,DISP
EACH MISCELLANEOUS DAILY PRN
Qty: 28.4 G | Refills: 0 | Status: SHIPPED | OUTPATIENT
Start: 2025-05-08

## 2025-05-08 RX ORDER — MONTELUKAST SODIUM 10 MG/1
10 TABLET ORAL
Qty: 90 TABLET | Refills: 1 | Status: SHIPPED | OUTPATIENT
Start: 2025-05-08

## 2025-05-08 NOTE — ASSESSMENT & PLAN NOTE
Patient has a lot of allergies.  Providing a refill for Singulair    Orders:    montelukast (SINGULAIR) 10 mg tablet; Take 1 tablet (10 mg total) by mouth daily at bedtime

## 2025-05-08 NOTE — ASSESSMENT & PLAN NOTE
Lab Results   Component Value Date    PFK3KCKUCGMT 1.167 06/07/2024    FREET4 1.11 08/26/2022     Does not endorse any complaints of tiredness, fatigue, weight loss or weight gain, change in appetite.  Recommend recheck of thyroid function   Continue taking levothyroxine 75 mcg at this time      Orders:    levothyroxine 75 mcg tablet; Take 1 tablet (75 mcg total) by mouth daily

## 2025-05-08 NOTE — ASSESSMENT & PLAN NOTE
Depression Screening Follow-up Plan: Patient's depression screening was positive with a PHQ-9 score of 13. Continue regular follow-up with their psychologist/therapist/psychiatrist who is managing their mental health condition(s).    -Patient has a lot of issues in her current housing due to her surroundings and her neighbors.  In today's office visit, we are providing her a letter so that she is able to move into a 2 bedroom apartment which should help with her mental health issues.    -Patient currently stable and is maintained on Vraylar  -She follows with a psychiatrist every 3 months  - RTO in July for scheduled follow-up

## 2025-05-08 NOTE — LETTER
May 8, 2025     Patient: Suzi Deejsus  YOB: 1970  Date of Visit: 5/8/2025      To Whom it May Concern:    Suzi Dejesus is under my professional care. Suzi was seen in my office on 5/8/2025. I am requesting that Suzi who has multiple health medical conditions that she be accommodated into a 2-bedroom apartment to allow her the space she needs for physical activity.     If you have any questions or concerns, please don't hesitate to call.         Sincerely,          Amy Michelle MD        CC: No Recipients

## 2025-05-08 NOTE — ASSESSMENT & PLAN NOTE
Depression Screening Follow-up Plan: Patient's depression screening was positive with a PHQ-9 score of 13. Continue regular follow-up with their psychologist/therapist/psychiatrist who is managing their mental health condition(s).

## 2025-05-08 NOTE — ASSESSMENT & PLAN NOTE
States that she gets rash under feet  Providing refill for Kenalog    Orders:    triamcinolone (KENALOG) 0.1 % cream; Apply 1 Application topically 2 (two) times a day To affected area

## 2025-05-08 NOTE — ASSESSMENT & PLAN NOTE
Patient states that she gets a rash around her eyes.  Providing refill for hydrocortisone    Orders:    hydrocortisone 0.5 % cream; Apply topically daily as needed for rash

## 2025-05-08 NOTE — ASSESSMENT & PLAN NOTE
States that she has a lot of allergies and watery eyes  Uses libertine in her eyes in her ears    Providing refill    Orders:    olopatadine (PATANOL) 0.1 % ophthalmic solution; Administer 1 drop to both eyes 3 (three) times a day

## 2025-05-08 NOTE — PROGRESS NOTES
Name: Suzi Dejesus      : 1970      MRN: [id number]  Encounter Provider: Amy Michelle MD  Encounter Date: 2025   Encounter department: [facility]  :  Assessment & Plan  Depression, unspecified depression type  Depression Screening Follow-up Plan: Patient's depression screening was positive with a PHQ-9 score of 13. Continue regular follow-up with their psychologist/therapist/psychiatrist who is managing their mental health condition(s).    -Patient has a lot of issues in her current housing due to her surroundings and her neighbors.  In today's office visit, we are providing her a letter so that she is able to move into a 2 bedroom apartment which should help with her mental health issues.    -Patient currently stable and is maintained on Vraylar  -She follows with a psychiatrist every 3 months  - RTO in July for scheduled follow-up         Hypothyroidism, unspecified type  Lab Results   Component Value Date    [id number] 1.167 2024    FREET4 1.11 2022     Does not endorse any complaints of tiredness, fatigue, weight loss or weight gain, change in appetite.  Recommend recheck of thyroid function   Continue taking levothyroxine 75 mcg at this time      Orders:    levothyroxine 75 mcg tablet; Take 1 tablet (75 mcg total) by mouth daily    Non-seasonal allergic rhinitis due to pollen  Patient has a lot of allergies.  Providing a refill for Singulair    Orders:    montelukast (SINGULAIR) 10 mg tablet; Take 1 tablet (10 mg total) by mouth daily at bedtime    Allergic conjunctivitis of both eyes  States that she has a lot of allergies and watery eyes  Uses libertine in her eyes in her ears    Providing refill    Orders:    olopatadine (PATANOL) 0.1 % ophthalmic solution; Administer 1 drop to both eyes 3 (three) times a day    Allergic dermatitis  States that she gets rash under feet  Providing refill for Kenalog    Orders:    triamcinolone (KENALOG) 0.1 % cream;  "Apply 1 Application topically 2 (two) times a day To affected area    Rash  Patient states that she gets a rash around her eyes.  Providing refill for hydrocortisone    Orders:    hydrocortisone 0.5 % cream; Apply topically daily as needed for rash    Severe episode of recurrent major depressive disorder, without psychotic features (HCC)  Depression Screening Follow-up Plan: Patient's depression screening was positive with a PHQ-9 score of 13. Continue regular follow-up with their psychologist/therapist/psychiatrist who is managing their mental health condition(s).         Alcohol dependence in remission (HCC)                History of Present Illness   55 year old female presents due to depression and anxiety. She has had issues with her neighbors. She has had informed police and her housing authority too.  Patient is sent to the office requesting letter to allow her to move into a 2 bedroom apartment from her 1 bedroom apartment which can give her access to creating a gym and get physical activity at home.  Providing her this letter today.      Review of Systems   Constitutional:  Negative for chills and fever.   HENT:  Negative for ear pain and sore throat.    Eyes:  Negative for pain and visual disturbance.   Respiratory:  Negative for cough and shortness of breath.    Cardiovascular:  Negative for chest pain and palpitations.   Gastrointestinal:  Negative for abdominal pain and vomiting.   Genitourinary:  Negative for dysuria and hematuria.   Musculoskeletal:  Negative for arthralgias and back pain.   Skin:  Negative for color change and rash.   Neurological:  Negative for seizures and syncope.   All other systems reviewed and are negative.      Objective   /88 (BP Location: Left arm, Patient Position: Sitting, Cuff Size: Standard)   Pulse 87   Ht 5' 2\" (1.575 m)   Wt 64 kg (141 lb 3.2 oz)   SpO2 94%   BMI 25.83 kg/m²      Physical Exam  Vitals and nursing note reviewed.   Constitutional:       " General: She is not in acute distress.     Appearance: She is well-developed.   HENT:      Head: Normocephalic and atraumatic.   Eyes:      Conjunctiva/sclera: Conjunctivae normal.   Cardiovascular:      Rate and Rhythm: Normal rate and regular rhythm.      Heart sounds: No murmur heard.  Pulmonary:      Effort: Pulmonary effort is normal. No respiratory distress.      Breath sounds: Normal breath sounds.   Abdominal:      Palpations: Abdomen is soft.      Tenderness: There is no abdominal tenderness.   Musculoskeletal:         General: No swelling.      Cervical back: Neck supple.   Skin:     General: Skin is warm and dry.      Capillary Refill: Capillary refill takes less than 2 seconds.   Neurological:      Mental Status: She is alert.   Psychiatric:         Mood and Affect: Mood normal.         Nutrition Assessment and Intervention:     Reviewed food recall journal      Other interventions: She is sleeping 4 hours at night.    Physical Activity Assessment and Intervention:    Activity journal reviewed      Emotional and Mental Well-being, Sleep, Connectedness Assessment and Intervention:    Sleep/stress assessment performed      Other interventions: She is sleeping 4 hours at night.     Tobacco and Toxic Substance Assessment and Intervention:     Tobacco use screening performed    Alcohol and drug use screening performed

## 2025-05-15 DIAGNOSIS — L30.9 ECZEMA, UNSPECIFIED TYPE: ICD-10-CM

## 2025-05-16 RX ORDER — CLOBETASOL PROPIONATE 0.5 MG/G
1 OINTMENT TOPICAL 2 TIMES DAILY
Qty: 30 G | Refills: 3 | Status: SHIPPED | OUTPATIENT
Start: 2025-05-16

## 2025-06-07 PROBLEM — H10.13 ALLERGIC CONJUNCTIVITIS OF BOTH EYES: Status: RESOLVED | Noted: 2025-05-08 | Resolved: 2025-06-07

## 2025-06-14 ENCOUNTER — HEALTH MAINTENANCE LETTER (OUTPATIENT)
Age: 55
End: 2025-06-14

## 2025-07-03 ENCOUNTER — APPOINTMENT (OUTPATIENT)
Dept: LAB | Facility: CLINIC | Age: 55
End: 2025-07-03
Attending: GENERAL ACUTE CARE HOSPITAL
Payer: MEDICARE

## 2025-07-03 DIAGNOSIS — E03.9 HYPOTHYROIDISM, UNSPECIFIED TYPE: ICD-10-CM

## 2025-07-03 DIAGNOSIS — Z79.899 POLYPHARMACY: ICD-10-CM

## 2025-07-03 DIAGNOSIS — E78.5 HYPERLIPIDEMIA, UNSPECIFIED HYPERLIPIDEMIA TYPE: ICD-10-CM

## 2025-07-03 LAB
ALBUMIN SERPL BCG-MCNC: 4.6 G/DL (ref 3.5–5)
ALP SERPL-CCNC: 70 U/L (ref 34–104)
ALT SERPL W P-5'-P-CCNC: 21 U/L (ref 7–52)
ANION GAP SERPL CALCULATED.3IONS-SCNC: 9 MMOL/L (ref 4–13)
AST SERPL W P-5'-P-CCNC: 27 U/L (ref 13–39)
BASOPHILS # BLD AUTO: 0.04 THOUSANDS/ÂΜL (ref 0–0.1)
BASOPHILS NFR BLD AUTO: 1 % (ref 0–1)
BILIRUB SERPL-MCNC: 0.83 MG/DL (ref 0.2–1)
BUN SERPL-MCNC: 14 MG/DL (ref 5–25)
CALCIUM SERPL-MCNC: 9.4 MG/DL (ref 8.4–10.2)
CHLORIDE SERPL-SCNC: 102 MMOL/L (ref 96–108)
CHOLEST SERPL-MCNC: 200 MG/DL (ref ?–200)
CO2 SERPL-SCNC: 28 MMOL/L (ref 21–32)
CREAT SERPL-MCNC: 0.69 MG/DL (ref 0.6–1.3)
EOSINOPHIL # BLD AUTO: 0.08 THOUSAND/ÂΜL (ref 0–0.61)
EOSINOPHIL NFR BLD AUTO: 1 % (ref 0–6)
ERYTHROCYTE [DISTWIDTH] IN BLOOD BY AUTOMATED COUNT: 12.4 % (ref 11.6–15.1)
EST. AVERAGE GLUCOSE BLD GHB EST-MCNC: 97 MG/DL
GFR SERPL CREATININE-BSD FRML MDRD: 99 ML/MIN/1.73SQ M
GLUCOSE P FAST SERPL-MCNC: 87 MG/DL (ref 65–99)
HBA1C MFR BLD: 5 %
HCT VFR BLD AUTO: 40.2 % (ref 34.8–46.1)
HDLC SERPL-MCNC: 105 MG/DL
HGB BLD-MCNC: 12.8 G/DL (ref 11.5–15.4)
IMM GRANULOCYTES # BLD AUTO: 0.02 THOUSAND/UL (ref 0–0.2)
IMM GRANULOCYTES NFR BLD AUTO: 0 % (ref 0–2)
LDLC SERPL CALC-MCNC: 88 MG/DL (ref 0–100)
LDLC SERPL DIRECT ASSAY-MCNC: 84 MG/DL (ref 0–100)
LYMPHOCYTES # BLD AUTO: 2.23 THOUSANDS/ÂΜL (ref 0.6–4.47)
LYMPHOCYTES NFR BLD AUTO: 37 % (ref 14–44)
MCH RBC QN AUTO: 30.8 PG (ref 26.8–34.3)
MCHC RBC AUTO-ENTMCNC: 31.8 G/DL (ref 31.4–37.4)
MCV RBC AUTO: 97 FL (ref 82–98)
MONOCYTES # BLD AUTO: 0.48 THOUSAND/ÂΜL (ref 0.17–1.22)
MONOCYTES NFR BLD AUTO: 8 % (ref 4–12)
NEUTROPHILS # BLD AUTO: 3.11 THOUSANDS/ÂΜL (ref 1.85–7.62)
NEUTS SEG NFR BLD AUTO: 53 % (ref 43–75)
NONHDLC SERPL-MCNC: 95 MG/DL
NRBC BLD AUTO-RTO: 0 /100 WBCS
PLATELET # BLD AUTO: 279 THOUSANDS/UL (ref 149–390)
PMV BLD AUTO: 9.1 FL (ref 8.9–12.7)
POTASSIUM SERPL-SCNC: 4.5 MMOL/L (ref 3.5–5.3)
PROT SERPL-MCNC: 7.3 G/DL (ref 6.4–8.4)
RBC # BLD AUTO: 4.16 MILLION/UL (ref 3.81–5.12)
SODIUM SERPL-SCNC: 139 MMOL/L (ref 135–147)
TRIGL SERPL-MCNC: 37 MG/DL (ref ?–150)
TSH SERPL DL<=0.05 MIU/L-ACNC: 2.12 UIU/ML (ref 0.45–4.5)
WBC # BLD AUTO: 5.96 THOUSAND/UL (ref 4.31–10.16)

## 2025-07-03 PROCEDURE — 84443 ASSAY THYROID STIM HORMONE: CPT

## 2025-07-03 PROCEDURE — 85025 COMPLETE CBC W/AUTO DIFF WBC: CPT

## 2025-07-03 PROCEDURE — 83721 ASSAY OF BLOOD LIPOPROTEIN: CPT

## 2025-07-03 PROCEDURE — 36415 COLL VENOUS BLD VENIPUNCTURE: CPT

## 2025-07-03 PROCEDURE — 80053 COMPREHEN METABOLIC PANEL: CPT

## 2025-07-03 PROCEDURE — 80061 LIPID PANEL: CPT

## 2025-07-03 PROCEDURE — 83036 HEMOGLOBIN GLYCOSYLATED A1C: CPT

## 2025-07-04 ENCOUNTER — RESULTS FOLLOW-UP (OUTPATIENT)
Dept: INTERNAL MEDICINE CLINIC | Facility: CLINIC | Age: 55
End: 2025-07-04

## 2025-07-07 NOTE — TELEPHONE ENCOUNTER
Pt called in to go over her results. She was made aware provider is out of the office. Requesting a call back once he reviews her labs

## 2025-07-09 NOTE — TELEPHONE ENCOUNTER
Patient called in to follow up, advised of message from Dr. Reyes. Patient would still like Dr. Raphael to give her a call to discuss once he returns to office.  Please advise, thank you

## 2025-07-14 ENCOUNTER — TELEPHONE (OUTPATIENT)
Age: 55
End: 2025-07-14

## 2025-07-25 DIAGNOSIS — J30.1 NON-SEASONAL ALLERGIC RHINITIS DUE TO POLLEN: ICD-10-CM

## 2025-07-25 DIAGNOSIS — E03.9 HYPOTHYROIDISM, UNSPECIFIED TYPE: ICD-10-CM

## 2025-07-25 DIAGNOSIS — L30.9 ECZEMA, UNSPECIFIED TYPE: ICD-10-CM

## 2025-07-25 DIAGNOSIS — L23.9 ALLERGIC DERMATITIS: ICD-10-CM

## 2025-07-25 DIAGNOSIS — R21 RASH: ICD-10-CM

## 2025-07-28 RX ORDER — DIAPER,BRIEF,INFANT-TODD,DISP
EACH MISCELLANEOUS DAILY PRN
Qty: 28.4 G | Refills: 0 | Status: SHIPPED | OUTPATIENT
Start: 2025-07-28

## 2025-07-28 RX ORDER — LEVOTHYROXINE SODIUM 75 UG/1
75 TABLET ORAL DAILY
Qty: 90 TABLET | Refills: 1 | Status: SHIPPED | OUTPATIENT
Start: 2025-07-28

## 2025-07-28 RX ORDER — CLOBETASOL PROPIONATE 0.5 MG/G
1 OINTMENT TOPICAL 2 TIMES DAILY
Qty: 30 G | Refills: 3 | Status: SHIPPED | OUTPATIENT
Start: 2025-07-28

## 2025-07-28 RX ORDER — MONTELUKAST SODIUM 10 MG/1
10 TABLET ORAL
Qty: 90 TABLET | Refills: 1 | Status: SHIPPED | OUTPATIENT
Start: 2025-07-28

## 2025-07-28 RX ORDER — TRIAMCINOLONE ACETONIDE 1 MG/G
1 CREAM TOPICAL 2 TIMES DAILY
Qty: 60 G | Refills: 1 | Status: SHIPPED | OUTPATIENT
Start: 2025-07-28

## 2025-08-15 ENCOUNTER — OFFICE VISIT (OUTPATIENT)
Dept: INTERNAL MEDICINE CLINIC | Facility: CLINIC | Age: 55
End: 2025-08-15
Payer: MEDICARE

## 2025-08-15 VITALS
DIASTOLIC BLOOD PRESSURE: 82 MMHG | OXYGEN SATURATION: 98 % | SYSTOLIC BLOOD PRESSURE: 146 MMHG | BODY MASS INDEX: 26.35 KG/M2 | WEIGHT: 143.2 LBS | HEART RATE: 73 BPM | HEIGHT: 62 IN | RESPIRATION RATE: 16 BRPM

## 2025-08-15 DIAGNOSIS — E66.1 CLASS 2 DRUG-INDUCED OBESITY WITHOUT SERIOUS COMORBIDITY WITH BODY MASS INDEX (BMI) OF 36.0 TO 36.9 IN ADULT: ICD-10-CM

## 2025-08-15 DIAGNOSIS — J30.1 NON-SEASONAL ALLERGIC RHINITIS DUE TO POLLEN: ICD-10-CM

## 2025-08-15 DIAGNOSIS — Z12.11 SCREENING FOR COLON CANCER: ICD-10-CM

## 2025-08-15 DIAGNOSIS — E66.812 CLASS 2 DRUG-INDUCED OBESITY WITHOUT SERIOUS COMORBIDITY WITH BODY MASS INDEX (BMI) OF 36.0 TO 36.9 IN ADULT: ICD-10-CM

## 2025-08-15 DIAGNOSIS — E03.9 HYPOTHYROIDISM, UNSPECIFIED TYPE: ICD-10-CM

## 2025-08-15 DIAGNOSIS — E66.3 OVERWEIGHT (BMI 25.0-29.9): ICD-10-CM

## 2025-08-15 DIAGNOSIS — Z13.6 SCREENING FOR CARDIOVASCULAR CONDITION: ICD-10-CM

## 2025-08-15 DIAGNOSIS — I10 PRIMARY HYPERTENSION: ICD-10-CM

## 2025-08-15 DIAGNOSIS — F31.9 BIPOLAR AFFECTIVE DISORDER, REMISSION STATUS UNSPECIFIED (HCC): ICD-10-CM

## 2025-08-15 DIAGNOSIS — F33.2 SEVERE EPISODE OF RECURRENT MAJOR DEPRESSIVE DISORDER, WITHOUT PSYCHOTIC FEATURES (HCC): Primary | ICD-10-CM

## 2025-08-15 PROCEDURE — G2211 COMPLEX E/M VISIT ADD ON: HCPCS | Performed by: INTERNAL MEDICINE

## 2025-08-15 PROCEDURE — 99214 OFFICE O/P EST MOD 30 MIN: CPT | Performed by: INTERNAL MEDICINE

## 2025-08-15 RX ORDER — MONTELUKAST SODIUM 10 MG/1
10 TABLET ORAL
Qty: 90 TABLET | Refills: 1 | Status: SHIPPED | OUTPATIENT
Start: 2025-08-15

## 2025-08-15 RX ORDER — HYDROCHLOROTHIAZIDE 12.5 MG/1
12.5 TABLET ORAL DAILY
Qty: 30 TABLET | Refills: 5 | Status: SHIPPED | OUTPATIENT
Start: 2025-08-15 | End: 2026-02-11

## 2025-08-15 RX ORDER — LEVOTHYROXINE SODIUM 75 UG/1
75 TABLET ORAL DAILY
Qty: 90 TABLET | Refills: 1 | Status: SHIPPED | OUTPATIENT
Start: 2025-08-15

## 2025-08-15 RX ORDER — FLUTICASONE PROPIONATE 50 MCG
2 SPRAY, SUSPENSION (ML) NASAL DAILY
Qty: 1 G | Refills: 0 | Status: SHIPPED | OUTPATIENT
Start: 2025-08-15

## 2025-08-17 PROBLEM — E66.811 CLASS 1 OBESITY DUE TO EXCESS CALORIES WITHOUT SERIOUS COMORBIDITY IN ADULT: Status: RESOLVED | Noted: 2019-06-24 | Resolved: 2025-08-17

## 2025-08-17 PROBLEM — E66.1 CLASS 2 DRUG-INDUCED OBESITY WITHOUT SERIOUS COMORBIDITY WITH BODY MASS INDEX (BMI) OF 36.0 TO 36.9 IN ADULT: Status: RESOLVED | Noted: 2019-10-25 | Resolved: 2025-08-17

## 2025-08-17 PROBLEM — E66.812 CLASS 2 DRUG-INDUCED OBESITY WITHOUT SERIOUS COMORBIDITY WITH BODY MASS INDEX (BMI) OF 36.0 TO 36.9 IN ADULT: Status: RESOLVED | Noted: 2019-10-25 | Resolved: 2025-08-17

## 2025-08-17 PROBLEM — E66.09 CLASS 1 OBESITY DUE TO EXCESS CALORIES WITHOUT SERIOUS COMORBIDITY IN ADULT: Status: RESOLVED | Noted: 2019-06-24 | Resolved: 2025-08-17

## (undated) DEVICE — LIGHT HANDLE COVER SLEEVE DISP BLUE STELLAR

## (undated) DEVICE — PAD GROUNDING ADULT

## (undated) DEVICE — IRRIG ENDO FLO TUBING

## (undated) DEVICE — INTENDED FOR TISSUE SEPARATION, AND OTHER PROCEDURES THAT REQUIRE A SHARP SURGICAL BLADE TO PUNCTURE OR CUT.: Brand: BARD-PARKER SAFETY BLADES SIZE 11, STERILE

## (undated) DEVICE — UNDYED BRAIDED (POLYGLACTIN 910), SYNTHETIC ABSORBABLE SUTURE: Brand: COATED VICRYL

## (undated) DEVICE — NEEDLE 22 G X 1 1/2 SAFETY

## (undated) DEVICE — ELECTRODE LAP J HOOK SPLIT STEM E-Z CLEAN 33CM -0021S

## (undated) DEVICE — VIAL DECANTER

## (undated) DEVICE — TISSUE RETRIEVAL SYSTEM: Brand: INZII RETRIEVAL SYSTEM

## (undated) DEVICE — ADHESIVE SKN CLSR HISTOACRYL FLEX 0.5ML LF

## (undated) DEVICE — CHLORAPREP HI-LITE 26ML ORANGE

## (undated) DEVICE — STERILE SURGICAL LUBRICANT,  TUBE: Brand: SURGILUBE

## (undated) DEVICE — TROCAR APPPLE 5MM EXTENDED LENGTH

## (undated) DEVICE — GLOVE SRG BIOGEL ECLIPSE 7

## (undated) DEVICE — SCD SEQUENTIAL COMPRESSION COMFORT SLEEVE MEDIUM KNEE LENGTH: Brand: KENDALL SCD

## (undated) DEVICE — TROCAR: Brand: KII FIOS FIRST ENTRY

## (undated) DEVICE — SUT MONOCRYL 4-0 PS-2 27 IN Y426H

## (undated) DEVICE — ALLENTOWN LAP CHOLE APP PACK: Brand: CARDINAL HEALTH

## (undated) DEVICE — LIGAMAX 5 MM ENDOSCOPIC MULTIPLE CLIP APPLIER: Brand: LIGAMAX

## (undated) DEVICE — DRAPE EQUIPMENT RF WAND